# Patient Record
Sex: MALE | Race: WHITE | NOT HISPANIC OR LATINO | Employment: OTHER | URBAN - METROPOLITAN AREA
[De-identification: names, ages, dates, MRNs, and addresses within clinical notes are randomized per-mention and may not be internally consistent; named-entity substitution may affect disease eponyms.]

---

## 2017-03-13 ENCOUNTER — TELEPHONE (OUTPATIENT)
Dept: NEUROLOGY | Facility: MEDICAL CENTER | Age: 62
End: 2017-03-13

## 2017-03-13 NOTE — TELEPHONE ENCOUNTER
Message: patient called stating that Norco is not working his headache and he would like a refill of Vicodin/percocet. He had a rx from us the the rx .    Caller: grace  Call Back #: [phone]  OK to leave detailed message: no    Left message for patient to call back.

## 2017-03-15 NOTE — TELEPHONE ENCOUNTER
We are no longer prescribing narcotics due to changing laws so I would recommend discussing with his PMD. MB

## 2017-03-15 NOTE — TELEPHONE ENCOUNTER
Called and spoke with pt. All information was given and pt and she verbally understood and will comply with all given. DOMINIQUE

## 2017-05-02 ENCOUNTER — OFFICE VISIT (OUTPATIENT)
Dept: NEUROLOGY | Facility: MEDICAL CENTER | Age: 62
End: 2017-05-02
Payer: COMMERCIAL

## 2017-05-02 VITALS
WEIGHT: 182 LBS | HEART RATE: 50 BPM | DIASTOLIC BLOOD PRESSURE: 70 MMHG | SYSTOLIC BLOOD PRESSURE: 116 MMHG | OXYGEN SATURATION: 98 % | TEMPERATURE: 97.9 F | BODY MASS INDEX: 29.25 KG/M2 | RESPIRATION RATE: 16 BRPM | HEIGHT: 66 IN

## 2017-05-02 DIAGNOSIS — S09.90XS ATAXIA DUE TO OLD HEAD INJURY: ICD-10-CM

## 2017-05-02 DIAGNOSIS — G25.1 TREMOR DUE TO MULTIPLE DRUGS: ICD-10-CM

## 2017-05-02 DIAGNOSIS — F02.80 DEMENTIA ASSOCIATED WITH OTHER UNDERLYING DISEASE WITHOUT BEHAVIORAL DISTURBANCE (HCC): ICD-10-CM

## 2017-05-02 DIAGNOSIS — R27.0 ATAXIA DUE TO OLD HEAD INJURY: ICD-10-CM

## 2017-05-02 DIAGNOSIS — G43.719 INTRACTABLE CHRONIC MIGRAINE WITHOUT AURA AND WITHOUT STATUS MIGRAINOSUS: ICD-10-CM

## 2017-05-02 PROCEDURE — 99214 OFFICE O/P EST MOD 30 MIN: CPT | Performed by: PSYCHIATRY & NEUROLOGY

## 2017-05-02 RX ORDER — DIVALPROEX SODIUM 500 MG/1
1000 TABLET, DELAYED RELEASE ORAL 2 TIMES DAILY
COMMUNITY
Start: 2010-09-24 | End: 2017-11-07

## 2017-05-02 RX ORDER — DIVALPROEX SODIUM 500 MG/1
1000 TABLET, EXTENDED RELEASE ORAL 3 TIMES DAILY
Refills: 11 | COMMUNITY
Start: 2017-02-02 | End: 2017-11-07

## 2017-05-02 RX ORDER — LEVETIRACETAM 500 MG/1
500 TABLET ORAL
COMMUNITY
Start: 2012-01-25 | End: 2017-11-07

## 2017-05-02 RX ORDER — OXYBUTYNIN CHLORIDE 10 MG/1
10 TABLET, EXTENDED RELEASE ORAL
Refills: 0 | COMMUNITY
Start: 2017-02-07 | End: 2019-12-05

## 2017-05-02 RX ORDER — CELECOXIB 200 MG/1
200 CAPSULE ORAL
COMMUNITY
End: 2017-11-07

## 2017-05-02 RX ORDER — KETOROLAC TROMETHAMINE 10 MG/1
10 TABLET, FILM COATED ORAL EVERY 4 HOURS PRN
Qty: 10 TAB | Refills: 5 | Status: SHIPPED | OUTPATIENT
Start: 2017-05-02 | End: 2020-05-05

## 2017-05-02 RX ORDER — TRAZODONE HYDROCHLORIDE 50 MG/1
50 TABLET ORAL
COMMUNITY
End: 2017-11-07

## 2017-05-02 RX ORDER — PANTOPRAZOLE SODIUM 40 MG/1
40 TABLET, DELAYED RELEASE ORAL 2 TIMES DAILY
Refills: 1 | COMMUNITY
Start: 2017-04-24

## 2017-05-02 RX ORDER — ONABOTULINUMTOXINA 200 [USP'U]/1
INJECTION, POWDER, LYOPHILIZED, FOR SOLUTION INTRADERMAL; INTRAMUSCULAR
COMMUNITY
Start: 2017-04-07

## 2017-05-02 RX ORDER — BUTALBITAL, ACETAMINOPHEN AND CAFFEINE 50; 325; 40 MG/1; MG/1; MG/1
1 TABLET ORAL EVERY 4 HOURS PRN
Qty: 30 TAB | Refills: 1 | Status: SHIPPED | OUTPATIENT
Start: 2017-05-02 | End: 2023-05-08

## 2017-05-02 RX ORDER — DIVALPROEX SODIUM 250 MG/1
TABLET, EXTENDED RELEASE ORAL
COMMUNITY
Start: 2011-07-01 | End: 2017-11-07

## 2017-05-02 RX ORDER — AMOXICILLIN 500 MG/1
CAPSULE ORAL
Refills: 0 | COMMUNITY
Start: 2017-02-17 | End: 2017-11-07

## 2017-05-02 RX ORDER — DIVALPROEX SODIUM 500 MG/1
750 TABLET, DELAYED RELEASE ORAL
COMMUNITY
End: 2017-11-07

## 2017-05-02 RX ORDER — ZOLPIDEM TARTRATE 10 MG/1
10 TABLET ORAL
COMMUNITY
End: 2017-11-07

## 2017-05-02 RX ORDER — RANITIDINE 300 MG/1
300 TABLET ORAL
Refills: 6 | COMMUNITY
Start: 2017-03-28 | End: 2017-11-07

## 2017-05-02 RX ORDER — DIVALPROEX SODIUM 500 MG/1
500 TABLET, EXTENDED RELEASE ORAL 2 TIMES DAILY
COMMUNITY
Start: 2012-01-25 | End: 2019-12-30 | Stop reason: SDUPTHER

## 2017-05-02 RX ORDER — OMEGA-3-ACID ETHYL ESTERS 1 G/1
CAPSULE, LIQUID FILLED ORAL
COMMUNITY
End: 2017-11-07

## 2017-05-02 RX ORDER — OMEGA-3 FATTY ACIDS/FISH OIL 300-1000MG
CAPSULE ORAL
COMMUNITY

## 2017-05-02 NOTE — ASSESSMENT & PLAN NOTE
Pt has had more seizures with poor weather and storms coming in. Pt has had an increase in szs with storms.

## 2017-05-02 NOTE — ASSESSMENT & PLAN NOTE
Pt has issues with HA when he bends over. Pt feels better when he gets up and moving. Pt feels worse in the am. Pt gets up slowly. Pt gets Botox with DR. MCGARRY

## 2017-05-02 NOTE — MR AVS SNAPSHOT
"        Eriberto Young   2017 3:20 PM   Office Visit   MRN: [id number]    Department:  Neurology [facility]   Dept Phone:  [phone]    Description:  Male : 1955   Provider:  Melissa P Bloch, M.D.           Reason for Visit     Follow-Up           Allergies as of 2017     Allergen Noted Reactions    Ativan 2017       High doses    Hydromorphone Hcl 2017       Nkda [No Known Drug Allergy] 2007         You were diagnosed with     Intractable chronic migraine without aura and without status migrainosus   [403081]         Vital Signs     Blood Pressure Pulse Temperature Respirations Height Weight    116/70 mmHg 50 36.6 °C (97.9 °F) 16 1.676 m (5' 6\") 82.555 kg (182 lb)    Body Mass Index Oxygen Saturation                29.39 kg/m2 98%          Basic Information     Date Of Birth Sex Race Ethnicity Preferred Language    1955 Male White Non- English      Your appointments     2017 11:00 AM   Follow Up Visit with Melissa P Bloch, M.D.   [facility] (--)    [address] [place] [address], [address]  [facility] [id number] [phone]           You will be receiving a confirmation call a few days before your appointment from our automated call confirmation system.              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      Below and/or attached are the medications your provider expects you to take. Review all of your home medications and newly ordered medications with your provider and/or pharmacist. Follow medication instructions as directed by your provider and/or pharmacist. Please keep your medication list with you and share with your provider. Update the information when medications are discontinued, doses are changed, or new medications (including over-the-counter products) are added; and carry medication information at all times in the event of emergency situations     Allergies:  ATIVAN - (reactions not documented)     HYDROMORPHONE HCL - (reactions not documented)     NKDA - (reactions not documented)               Medications  Valid as of: May 02, 2017 -  4:48 PM    Generic Name Brand Name Tablet Size Instructions for use    Acetaminophen-Codeine (Tab) TYLENOL #3 300-30 MG Take  by mouth.        Amoxicillin (Cap) AMOXIL 500 MG TAKE 4 CAPSULES BY MOUTH 1 HR PRIOR TO DENTAL APPOINTMENT        Aspirin-Acetaminophen-Caffeine (Tab) EXCEDRIN 250-250-65 MG Take  by mouth.        Butalbital-APAP-Caffeine (Tab) FIORICET -40 MG Take 1 Tab by mouth every four hours as needed for Headache.        Celecoxib (Cap) CELEBREX 200 MG Take 200 mg by mouth.        Divalproex Sodium (Tablet Delayed Response) DEPAKOTE 500 MG Take 1,000 mg by mouth 3 times a day.        Divalproex Sodium (TABLET SR 24 HR) DEPAKOTE  MG by Other route.        Divalproex Sodium (TABLET SR 24 HR) DEPAKOTE  MG Take 1,000 mg by mouth.        Divalproex Sodium (Tablet Delayed Response) DEPAKOTE 500 MG Take 1,000 mg by mouth 2 Times a Day.        Divalproex  Sodium (Tablet Delayed Response) DEPAKOTE 500 MG Take 750 mg by mouth.        Divalproex Sodium (TABLET SR 24 HR) DEPAKOTE  MG Take 1,000 mg by mouth 3 times a day.        Hydrocodone-Acetaminophen (Tab) NORCO 5-325 MG Take 1-2 Tabs by mouth every four hours as needed.        Hydrocodone-Acetaminophen (Tab) NORCO 5-325 MG Take 1-2 Tabs by mouth every four hours as needed.        Ibuprofen (Cap) Ibuprofen 200 MG None Entered        Isometheptene-Dichloral-APAP (Cap) MIDRIN -325 MG Take  by mouth.        Ketorolac Tromethamine (Tab) TORADOL 10 MG Take 1 Tab by mouth every four hours as needed.        LevETIRAcetam (Tab) KEPPRA 500 MG Take 500 mg by mouth.        Memantine HCl (CAPSULE SR 24 HR) NAMENDA 28 MG Take 1 Cap by mouth every day.        Omega-3-acid Ethyl Esters (Cap) LOVAZA 1 GM Take 1,000 mg by mouth every day.        Omega-3-acid Ethyl Esters (Cap) LOVAZA 1 GM 4 CAPSULES DAILY WITH FOOD        OnabotulinumtoxinA (Recon Soln) BOTOX 200 UNITS         Oxybutynin Chloride (TABLET SR 24 HR) DITROPAN-XL 10 MG Take 10 mg by mouth.        Oxycodone-Acetaminophen (Tab) PERCOCET 5-325 MG Take 1-2 Tabs by mouth every 6 hours as needed (moderate to severe pain).        Pantoprazole Sodium (Tablet Delayed Response) PROTONIX 40 MG TAKE 1 TABLET BY MOUTH DAILY, 30 MIN BEFORE BREAKFAST        Pediatric Multivitamins-Fl           Prenatal Vit-Fe Fumarate-FA           Propranolol HCl (CAPSULE SR 24 HR) INDERAL LA 60 MG TAKE 1 CAP BY MOUTH EVERY DAY.        RaNITidine HCl (Tab) ZANTAC 300 MG Take 300 mg by mouth.        Topiramate (Tab) TOPAMAX 25 MG Take 1 Tab by mouth every morning. 50 mg prn        Topiramate (Tab) TOPAMAX 50 MG TAKE 1 TABLET BY MOUTH TWICE A DAY        TraZODone HCl (Tab) DESYREL 50 MG Take 50 mg by mouth as needed.        TraZODone HCl (Tab) DESYREL 50 MG Take 50 mg by mouth.        Zolpidem Tartrate (Tab) AMBIEN 10 MG Take 10 mg by mouth.        .                 Medicines prescribed today  were sent to:     [id number] - [place], [place] - [address]    [address] [place] [address]    Phone: [phone] Fax: [phone]    Open 24 Hours?: No      Medication refill instructions:       If your prescription bottle indicates you have medication refills left, it is not necessary to call your provider’s office. Please contact your pharmacy and they will refill your medication.    If your prescription bottle indicates you do not have any refills left, you may request refills at any time through one of the following ways: The online [employer] management system (except Urgent Care), by calling your provider’s office, or by asking your pharmacy to contact your provider’s office with a refill request. Medication refills are processed only during regular business hours and may not be available until the next business day. Your provider may request additional information or to have a follow-up visit with you prior to refilling your medication.   *Please Note: Medication refills are assigned a new Rx number when refilled electronically. Your pharmacy may indicate that no refills were authorized even though a new prescription for the same medication is available at the pharmacy. Please request the medicine by name with the pharmacy before contacting your provider for a refill.        Your To Do List     Future Labs/Procedures Complete By Expires    CBC WITH DIFFERENTIAL  As directed 5/2/2018    COMP METABOLIC PANEL  As directed 5/2/2018         [employer] Access Code: [email or web address]  Expires: 6/1/2017  2:57 PM    Your email address is not on file at [facility].  Email Addresses are required for you to sign up for [employer] management, please contact [phone] to verify your personal information and to provide your email address prior to attempting to register for [employer] management.    Eriberto Young  [address] [place] [address] [place] [address]    [employer] management  A secure, online tool to manage your health information     [facility]’s MyChart® is a secure, online tool that connects you to your personalized health information from the privacy of your home -- day or night - making it very easy for you to manage your healthcare. Once the activation process is completed, you can even access your medical information using the Kippt sena, which is available for free in the Apple Sena store or Google Play store.     To learn more about Kippt, visit [email or web address]    There are two levels of access available (as shown below):   My Chart Features  [facility]  Urgent  Care Non-[facility] Doctor   Email your healthcare team securely and privately 24/7 X X X    Manage appointments: schedule your next appointment; view details of past/upcoming appointments X      Request prescription refills. X      View recent personal medical records, including lab and immunizations X X X X   View health record, including health history, allergies, medications X X X X   Read reports about your outpatient visits, procedures, consult and ER notes X X X X   See your discharge summary, which is a recap of your hospital and/or ER visit that includes your diagnosis, lab results, and care plan X X  X     How to register for Kippt:  Once your e-mail address has been verified, follow the following steps to sign up for Kippt.     1. Go to  [email or web address]  2. Click on the Sign Up Now box, which takes you to the New Member Sign Up page. You will need to provide the following information:  a. Enter your Kippt Access Code exactly as it appears at the top of this page. (You will not need to use this code after you’ve completed the sign-up process. If you do not sign up before the expiration date, you must request a new code.)   b. Enter your date of birth.   c. Enter your home email address.   d. Click Submit, and follow the next screen’s instructions.  3. Create a Kippt ID. This will be your Fastnotet  login ID and cannot be changed, so think of one that is secure and easy to remember.  4. Create a [employer] password. You can change your password at any time.  5. Enter your Password Reset Question and Answer. This can be used at a later time if you forget your password.   6. Enter your e-mail address. This allows you to receive e-mail notifications when new information is available in [employer].  7. Click Sign Up. You can now view your health information.    For assistance activating your [employer] account, call [phone]

## 2017-05-09 NOTE — PROGRESS NOTES
CHIEF COMPLAINT  Chief Complaint   Patient presents with   • Follow-Up       HPI  Eriberto Young is a 60 y.o. male who presents for treatment of multiple types of headaches with concerns about his emotional state and frequent crying. Patient did get some benefit from an ONB at [facility].  Headache, chronic migraine without aura, intractable  Pt has issues with HA when he bends over. Pt feels better when he gets up and moving. Pt feels worse in the am. Pt gets up slowly. Pt gets Botox with DR. MCGARRY    Complex partial epilepsy, intractable  Pt has had more seizures with poor weather and storms coming in. Pt has had an increase in szs with storms.    Tremor due to multiple drugs  Tremor gets slightly worse when he is stressed and we did discuss that the medications like Depakote may be exacerbating this.    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    Dementia associated with other underlying disease without behavioral disturbance  Memory problems persist but have stabilized.      REVIEW OF SYSTEMS  Pertinent Positives: Memory loss and cognitive change,  Gait imbalance, pseudoseizure.   All other systems are negative.     PAST MEDICAL HISTORY  Past Medical History   Diagnosis Date   • Hydrocephalus      w/ shunt   • Post concussion syndrome 11/10/2014       SOCIAL HISTORY  Social History     Social History   • Marital Status:      Spouse Name: N/A   • Number of Children: N/A   • Years of Education: N/A     Occupational History   • Not on file.     Social History Main Topics   • Smoking status: Never Smoker    • Smokeless tobacco: Not on file   • Alcohol Use: Yes   • Drug Use: No   • Sexual Activity: Not on file     Other Topics Concern   • Not on file     Social History Narrative       SURGICAL HISTORY  Past Surgical History   Procedure Laterality Date   • Hernia rep hiatal       times 2   • Achilles tendon repair         CURRENT  MEDICATIONS  Current Outpatient Prescriptions   Medication Sig Dispense Refill   • acetaminophen-codeine #3 (TYLENOL #3) 300-30 MG Tab Take  by mouth.     • divalproex (DEPAKOTE) 500 MG Tablet Delayed Response Take 1,000 mg by mouth 2 Times a Day.     • BOTOX 200 UNITS Recon Soln      • oxybutynin SR (DITROPAN-XL) 10 MG CR tablet Take 10 mg by mouth.  0   • pantoprazole (PROTONIX) 40 MG Tablet Delayed Response TAKE 1 TABLET BY MOUTH DAILY, 30 MIN BEFORE BREAKFAST  1   • ketorolac (TORADOL) 10 MG Tab Take 1 Tab by mouth every four hours as needed. 10 Tab 5   • acetaminophen/caffeine/butalbital 325-40-50 mg (FIORICET) -40 MG Tab Take 1 Tab by mouth every four hours as needed for Headache. 30 Tab 1   • Memantine HCl ER (NAMENDA) 28 MG CAPSULE SR 24 HR Take 1 Cap by mouth every day. 30 Cap 5   • propranolol LA (INDERAL LA) 60 MG CAPSULE SR 24 HR TAKE 1 CAP BY MOUTH EVERY DAY. 90 Cap 4   • topiramate (TOPAMAX) 50 MG tablet TAKE 1 TABLET BY MOUTH TWICE A  Tab 3   • hydrocodone-acetaminophen (NORCO) 5-325 MG TABS per tablet Take 1-2 Tabs by mouth every four hours as needed. 30 Tab 5   • oxycodone-acetaminophen (PERCOCET) 5-325 MG TABS Take 1-2 Tabs by mouth every 6 hours as needed (moderate to severe pain). 10 Each 0   • omega-3 acid ethyl esters (LOVAZA) 1 GM capsule Take 1,000 mg by mouth every day.     • trazodone (DESYREL) 50 MG TABS Take 50 mg by mouth as needed.     • M-VIT PO      • Ibuprofen 200 MG Cap None Entered     • asa/apap/caffeine (EXCEDRIN) 250-250-65 MG Tab Take  by mouth.     • celecoxib (CELEBREX) 200 MG Cap Take 200 mg by mouth.     • levetiracetam (KEPPRA) 500 MG Tab Take 500 mg by mouth.     • acetaminophen-isometheptene-dichloralphonazone (MIDRIN) -325 MG Cap Take  by mouth.     • Pediatric Multivitamins-Fl (MULTI VIT/FLUORIDE PO)      • zolpidem (AMBIEN) 10 MG Tab Take 10 mg by mouth.     • divalproex ER (DEPAKOTE ER) 250 MG TABLET SR 24 HR by Other route.     • divalproex ER  "(DEPAKOTE ER) 500 MG TABLET SR 24 HR Take 1,000 mg by mouth.     • divalproex (DEPAKOTE) 500 MG Tablet Delayed Response Take 750 mg by mouth.     • omega-3 acid ethyl esters (LOVAZA) 1 GM capsule 4 CAPSULES DAILY WITH FOOD     • trazodone (DESYREL) 50 MG Tab Take 50 mg by mouth.     • amoxicillin (AMOXIL) 500 MG Cap TAKE 4 CAPSULES BY MOUTH 1 HR PRIOR TO DENTAL APPOINTMENT  0   • divalproex ER (DEPAKOTE ER) 500 MG TABLET SR 24 HR Take 1,000 mg by mouth 3 times a day.  11   • ranitidine (ZANTAC) 300 MG tablet Take 300 mg by mouth.  6   • topiramate (TOPAMAX) 25 MG TABS Take 1 Tab by mouth every morning. 50 mg prn 90 Tab 11   • hydrocodone-acetaminophen (NORCO) 5-325 MG TABS per tablet Take 1-2 Tabs by mouth every four hours as needed.     • divalproex EC (DEPAKOTE) 500 MG TBEC Take 1,000 mg by mouth 3 times a day.       No current facility-administered medications for this visit.       ALLERGIES  Allergies   Allergen Reactions   • Ativan      High doses   • Hydromorphone Hcl    • Nkda [No Known Drug Allergy]        PHYSICAL EXAM  VITAL SIGNS: /70 mmHg  Pulse 50  Temp(Src) 36.6 °C (97.9 °F)  Resp 16  Ht 1.676 m (5' 6\")  Wt 82.555 kg (182 lb)  BMI 29.39 kg/m2  SpO2 98%  Constitutional: Well developed, Well nourished, No acute distress, Non-toxic appearance.   HENT: Shunt placement is palpated and nontender  Eyes: Fundi disc sharp, start her movements intact  Neck: Normal range of motion, No tenderness, Supple, No stridor.   Cardiovascular: Normal heart rate, Normal rhythm, No murmurs, No rubs, No gallops.   Thorax & Lungs: Normal breath sounds, No respiratory distress, No wheezing, No chest tenderness.   Abdomen: Bowel sounds normal, Soft, No tenderness, No masses, No pulsatile masses.   Skin: Warm, Dry, No erythema, No rash.   Back: No tenderness, No CVA tenderness.   Extremities: Intact distal pulses, No edema, No tenderness, No cyanosis, No clubbing.   Neurologic: Alert and oriented x3, cranial nerves " II through XII intact, motor exam:  Psychiatric: Affect normal, Judgment normal, Mood normal.   Lab: Reviewed with patient in detail and as noted in results.    EEG  EEG was normal and patient had nonepileptic events during the evaluation.      ASSESSMENT AND PLAN  1. Chronic migraine without aura, with intractable migraine, so stated, without mention of status migrainosus  Plan is to continue propranolol. He is experiencing headaches every day that are migrainous. Patient has received benefit from Botox and external nerve blocks from his other specialists. Plan to continue Depakote for headache prevention in addition to Topamax.  - propranolol LA (INDERAL LA) 60 MG CP24; Take 1 Cap by mouth every day.  Dispense: 30 Cap; Refill: 11    2. Tremor due to multiple drugs  Patient also has a slight stammer and hand action tremor. The propranolol also helps with a tremor in addition to the headaches.     3. Dementia from underlying medical problem related to head injury    Continue Namenda    4. Ataxia from underlying head injury    Recommend physical therapy at home and make referral if necessary    5. Complex partial epilepsy intractable    Continue refilling Depakote and Topamax. Slow taper off of Depakote as patient tolerates to reduce tremor.Recommend good hydration while taking these medications.      I spent 35 minutes with this patient and his wife, over fifty percent was spent counseling patient on their condition, best management practices, reviewing test results and risks and benefits of treatment.

## 2017-05-09 NOTE — ASSESSMENT & PLAN NOTE
Tremor gets slightly worse when he is stressed and we did discuss that the medications like Depakote may be exacerbating this.

## 2017-10-17 ENCOUNTER — HOSPITAL ENCOUNTER (OUTPATIENT)
Dept: HOSPITAL 8 - OUT | Age: 62
End: 2017-10-17
Attending: INTERNAL MEDICINE
Payer: COMMERCIAL

## 2017-10-17 VITALS — WEIGHT: 175.05 LBS | BODY MASS INDEX: 28.13 KG/M2 | HEIGHT: 66 IN

## 2017-10-17 VITALS — SYSTOLIC BLOOD PRESSURE: 122 MMHG | DIASTOLIC BLOOD PRESSURE: 79 MMHG

## 2017-10-17 DIAGNOSIS — Z87.19: ICD-10-CM

## 2017-10-17 DIAGNOSIS — K44.9: ICD-10-CM

## 2017-10-17 DIAGNOSIS — K22.70: Primary | ICD-10-CM

## 2017-10-17 DIAGNOSIS — Z98.890: ICD-10-CM

## 2017-10-17 DIAGNOSIS — G43.909: ICD-10-CM

## 2017-10-17 PROCEDURE — 43239 EGD BIOPSY SINGLE/MULTIPLE: CPT

## 2017-10-17 PROCEDURE — 88305 TISSUE EXAM BY PATHOLOGIST: CPT

## 2017-10-17 PROCEDURE — 93005 ELECTROCARDIOGRAM TRACING: CPT

## 2017-10-17 RX ADMIN — MIDAZOLAM HYDROCHLORIDE PRN MG: 1 INJECTION, SOLUTION INTRAMUSCULAR; INTRAVENOUS at 11:37

## 2017-10-17 RX ADMIN — FENTANYL CITRATE PRN MCG: 50 INJECTION INTRAMUSCULAR; INTRAVENOUS at 12:06

## 2017-10-17 RX ADMIN — LORAZEPAM PRN MG: 2 INJECTION INTRAMUSCULAR; INTRAVENOUS at 11:43

## 2017-10-17 RX ADMIN — FENTANYL CITRATE PRN MCG: 50 INJECTION INTRAMUSCULAR; INTRAVENOUS at 12:22

## 2017-10-17 RX ADMIN — MIDAZOLAM HYDROCHLORIDE PRN MG: 1 INJECTION, SOLUTION INTRAMUSCULAR; INTRAVENOUS at 11:32

## 2017-10-17 RX ADMIN — FENTANYL CITRATE PRN MCG: 50 INJECTION INTRAMUSCULAR; INTRAVENOUS at 12:12

## 2017-10-17 RX ADMIN — FENTANYL CITRATE PRN MCG: 50 INJECTION INTRAMUSCULAR; INTRAVENOUS at 12:30

## 2017-10-17 RX ADMIN — LORAZEPAM PRN MG: 2 INJECTION INTRAMUSCULAR; INTRAVENOUS at 11:38

## 2017-11-07 ENCOUNTER — OFFICE VISIT (OUTPATIENT)
Dept: NEUROLOGY | Facility: MEDICAL CENTER | Age: 62
End: 2017-11-07
Payer: COMMERCIAL

## 2017-11-07 VITALS
BODY MASS INDEX: 28.28 KG/M2 | RESPIRATION RATE: 16 BRPM | HEART RATE: 62 BPM | TEMPERATURE: 97.7 F | SYSTOLIC BLOOD PRESSURE: 110 MMHG | WEIGHT: 176 LBS | OXYGEN SATURATION: 95 % | DIASTOLIC BLOOD PRESSURE: 62 MMHG | HEIGHT: 66 IN

## 2017-11-07 DIAGNOSIS — G25.1 TREMOR DUE TO MULTIPLE DRUGS: ICD-10-CM

## 2017-11-07 DIAGNOSIS — G43.719 INTRACTABLE CHRONIC MIGRAINE WITHOUT AURA AND WITHOUT STATUS MIGRAINOSUS: ICD-10-CM

## 2017-11-07 DIAGNOSIS — G40.219 PARTIAL SYMPTOMATIC EPILEPSY WITH COMPLEX PARTIAL SEIZURES, INTRACTABLE, WITHOUT STATUS EPILEPTICUS (HCC): ICD-10-CM

## 2017-11-07 DIAGNOSIS — F02.80 DEMENTIA ASSOCIATED WITH OTHER UNDERLYING DISEASE WITHOUT BEHAVIORAL DISTURBANCE (HCC): ICD-10-CM

## 2017-11-07 DIAGNOSIS — S09.90XS ATAXIA DUE TO OLD HEAD INJURY: ICD-10-CM

## 2017-11-07 DIAGNOSIS — F43.23 ADJUSTMENT REACTION WITH ANXIETY AND DEPRESSION: ICD-10-CM

## 2017-11-07 DIAGNOSIS — R27.0 ATAXIA DUE TO OLD HEAD INJURY: ICD-10-CM

## 2017-11-07 PROCEDURE — 99214 OFFICE O/P EST MOD 30 MIN: CPT | Performed by: PSYCHIATRY & NEUROLOGY

## 2017-11-07 RX ORDER — AZITHROMYCIN 250 MG/1
TABLET, FILM COATED ORAL
COMMUNITY
Start: 2017-08-17 | End: 2017-11-07

## 2017-11-07 RX ORDER — INFLUENZA VIRUS VACCINE 15; 15; 15; 15 UG/.5ML; UG/.5ML; UG/.5ML; UG/.5ML
SUSPENSION INTRAMUSCULAR
Refills: 0 | COMMUNITY
Start: 2017-10-06 | End: 2020-05-05

## 2017-11-07 ASSESSMENT — PATIENT HEALTH QUESTIONNAIRE - PHQ9
SUM OF ALL RESPONSES TO PHQ QUESTIONS 1-9: 13
CLINICAL INTERPRETATION OF PHQ2 SCORE: 3
5. POOR APPETITE OR OVEREATING: 3 - NEARLY EVERY DAY

## 2017-11-14 NOTE — PROGRESS NOTES
CHIEF COMPLAINT  Chief Complaint   Patient presents with   • Follow-Up     Intractable chronic migraine without aura and without status migrainosus   • Follow-Up     Partial symptomatic epilepsy with complex partial seizures, intractable, without status epilepticus       HPI  Eriberto Young is a 60 y.o. male who presents for treatment of multiple types of headaches with concerns about his emotional state and frequent crying. Patient did get some benefit from an ONB at [facility].  No problem-specific Assessment & Plan notes found for this encounter.    REVIEW OF SYSTEMS  Pertinent Positives: Memory loss and cognitive change,  Gait imbalance, pseudoseizure.   All other systems are negative.     PAST MEDICAL HISTORY  Past Medical History:   Diagnosis Date   • Complex partial epilepsy, intractable (CMS-HCC)    • Hydrocephalus     w/ shunt   • Post concussion syndrome 11/10/2014       SOCIAL HISTORY  Social History     Social History   • Marital status:      Spouse name: N/A   • Number of children: N/A   • Years of education: N/A     Occupational History   • Not on file.     Social History Main Topics   • Smoking status: Never Smoker   • Smokeless tobacco: Never Used   • Alcohol use Yes   • Drug use: No   • Sexual activity: Not on file     Other Topics Concern   • Not on file     Social History Narrative   • No narrative on file       SURGICAL HISTORY  Past Surgical History:   Procedure Laterality Date   • ACHILLES TENDON REPAIR     • HERNIA REP HIATAL      times 2       CURRENT MEDICATIONS  Current Outpatient Prescriptions   Medication Sig Dispense Refill   • divalproex ER (DEPAKOTE ER) 500 MG TABLET SR 24 HR Take 1,000 mg by mouth 2 Times a Day.     • BOTOX 200 UNITS Recon Soln      • oxybutynin SR (DITROPAN-XL) 10 MG CR tablet Take 10 mg by mouth.  0   • pantoprazole (PROTONIX) 40 MG Tablet Delayed Response TAKE 1 TABLET BY MOUTH DAILY, 30 MIN BEFORE BREAKFAST  1   • ketorolac (TORADOL) 10 MG Tab Take 1 Tab  "by mouth every four hours as needed. 10 Tab 5   • acetaminophen/caffeine/butalbital 325-40-50 mg (FIORICET) -40 MG Tab Take 1 Tab by mouth every four hours as needed for Headache. 30 Tab 1   • propranolol LA (INDERAL LA) 60 MG CAPSULE SR 24 HR TAKE 1 CAP BY MOUTH EVERY DAY. 90 Cap 4   • topiramate (TOPAMAX) 50 MG tablet TAKE 1 TABLET BY MOUTH TWICE A  Tab 3   • hydrocodone-acetaminophen (NORCO) 5-325 MG TABS per tablet Take 1-2 Tabs by mouth every four hours as needed. 30 Tab 5   • omega-3 acid ethyl esters (LOVAZA) 1 GM capsule Take 1,000 mg by mouth every day.     • trazodone (DESYREL) 50 MG TABS Take 50 mg by mouth as needed.     • M-VIT PO      • FLUARIX QUADRIVALENT 0.5 ML Suspension Prefilled Syringe injection TO BE ADMINISTERED BY PHARMACIST FOR IMMUNIZATION  0   • Ibuprofen 200 MG Cap None Entered     • asa/apap/caffeine (EXCEDRIN) 250-250-65 MG Tab Take  by mouth.       No current facility-administered medications for this visit.        ALLERGIES  Allergies   Allergen Reactions   • Ativan      High doses   • Hydromorphone Hcl    • Nkda [No Known Drug Allergy]        PHYSICAL EXAM  VITAL SIGNS: /62   Pulse 62   Temp 36.5 °C (97.7 °F)   Resp 16   Ht 1.676 m (5' 6\")   Wt 79.8 kg (176 lb)   SpO2 95%   BMI 28.41 kg/m²   Constitutional: Well developed, Well nourished, No acute distress, Non-toxic appearance.   HENT: Shunt placement is palpated and nontender  Eyes: Fundi disc sharp, start her movements intact  Neck: Normal range of motion, No tenderness, Supple, No stridor.   Cardiovascular: Normal heart rate, Normal rhythm, No murmurs, No rubs, No gallops.   Thorax & Lungs: Normal breath sounds, No respiratory distress, No wheezing, No chest tenderness.   Abdomen: Bowel sounds normal, Soft, No tenderness, No masses, No pulsatile masses.   Skin: Warm, Dry, No erythema, No rash.   Back: No tenderness, No CVA tenderness.   Extremities: Intact distal pulses, No edema, No tenderness, No " cyanosis, No clubbing.   Neurologic: Alert and oriented x3, cranial nerves II through XII intact, motor exam:  Psychiatric: Affect normal, Judgment normal, Mood normal.   Lab: Reviewed with patient in detail and as noted in results.    EEG  EEG was normal and patient had nonepileptic events during the evaluation.      ASSESSMENT AND PLAN  1. Chronic migraine without aura, with intractable migraine, so stated, without mention of status migrainosus  Plan is to continue propranolol. He is experiencing headaches every day that are migrainous. Patient has received benefit from Botox and external nerve blocks from his other specialists. Plan to continue Depakote for headache prevention in addition to Topamax.  - propranolol LA (INDERAL LA) 60 MG CP24; Take 1 Cap by mouth every day.  Dispense: 30 Cap; Refill: 11    2. Tremor due to multiple drugs  Patient also has a slight stammer and hand action tremor. The propranolol also helps with a tremor in addition to the headaches.     3. Dementia from underlying medical problem related to head injury    Continue Namenda    4. 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 Complex partial epilepsy intractable    Continue refilling Depakote and Topamax. Slow taper off of Depakote as patient tolerates to reduce tremor.Recommend good hydration while taking these medications.      I spent 35 minutes with this patient and his wife, over fifty percent was spent counseling patient on their condition, best management practices, reviewing test results and risks and benefits of treatment.

## 2017-11-14 NOTE — ASSESSMENT & PLAN NOTE
Patient has not had any breakthrough seizures recently. Patient occasionally have a nonepileptic seizure in a stressful time.

## 2017-11-14 NOTE — ASSESSMENT & PLAN NOTE
Patient and his wife believe the medications are helping and he was able to stay on his own over the summer.

## 2017-11-14 NOTE — ASSESSMENT & PLAN NOTE
Patient and his wife aware that the combination of the Topamax and the divalproex may make his tremors worse.

## 2018-01-09 ENCOUNTER — OFFICE VISIT (OUTPATIENT)
Dept: NEUROLOGY | Facility: MEDICAL CENTER | Age: 63
End: 2018-01-09
Payer: COMMERCIAL

## 2018-01-09 DIAGNOSIS — F02.80 DEMENTIA ASSOCIATED WITH OTHER UNDERLYING DISEASE WITHOUT BEHAVIORAL DISTURBANCE (HCC): ICD-10-CM

## 2018-01-09 DIAGNOSIS — G40.219 PARTIAL SYMPTOMATIC EPILEPSY WITH COMPLEX PARTIAL SEIZURES, INTRACTABLE, WITHOUT STATUS EPILEPTICUS (HCC): ICD-10-CM

## 2018-01-09 PROCEDURE — 99213 OFFICE O/P EST LOW 20 MIN: CPT | Performed by: PHYSICIAN ASSISTANT

## 2018-01-09 ASSESSMENT — PATIENT HEALTH QUESTIONNAIRE - PHQ9: CLINICAL INTERPRETATION OF PHQ2 SCORE: 0

## 2018-01-09 NOTE — PROGRESS NOTES
Cc:  - seizures and cognitive changes    Established patient of Dr. Bloch  who suffers from the above  Here today for completion of paperwork for FMLA/disability/related to their condition.    They report to me that this is their annual paperwork related to their condition.    Review of the medical chart and interviewing patient, I completed paperwork and it is scanned into media.     RTC as previously scheduled.    Total time with this visit: 15    Minutes face-to-face with patient. More than 50% of this visit was spent reviewing medical chart and speaking with the patient about their condition and how it affects their ability to do their job.

## 2018-05-07 ENCOUNTER — OFFICE VISIT (OUTPATIENT)
Dept: NEUROLOGY | Facility: MEDICAL CENTER | Age: 63
End: 2018-05-07
Payer: COMMERCIAL

## 2018-05-07 VITALS
BODY MASS INDEX: 27.99 KG/M2 | SYSTOLIC BLOOD PRESSURE: 116 MMHG | WEIGHT: 174.16 LBS | HEIGHT: 66 IN | HEART RATE: 56 BPM | DIASTOLIC BLOOD PRESSURE: 74 MMHG | TEMPERATURE: 98.2 F | OXYGEN SATURATION: 96 %

## 2018-05-07 DIAGNOSIS — G43.719 INTRACTABLE CHRONIC MIGRAINE WITHOUT AURA AND WITHOUT STATUS MIGRAINOSUS: ICD-10-CM

## 2018-05-07 DIAGNOSIS — F02.80 DEMENTIA ASSOCIATED WITH OTHER UNDERLYING DISEASE WITHOUT BEHAVIORAL DISTURBANCE (HCC): ICD-10-CM

## 2018-05-07 DIAGNOSIS — G25.1 TREMOR DUE TO MULTIPLE DRUGS: ICD-10-CM

## 2018-05-07 DIAGNOSIS — S09.90XS ATAXIA DUE TO OLD HEAD INJURY: ICD-10-CM

## 2018-05-07 DIAGNOSIS — R27.0 ATAXIA DUE TO OLD HEAD INJURY: ICD-10-CM

## 2018-05-07 DIAGNOSIS — G40.219 PARTIAL SYMPTOMATIC EPILEPSY WITH COMPLEX PARTIAL SEIZURES, INTRACTABLE, WITHOUT STATUS EPILEPTICUS (HCC): ICD-10-CM

## 2018-05-07 PROCEDURE — 99214 OFFICE O/P EST MOD 30 MIN: CPT | Performed by: PSYCHIATRY & NEUROLOGY

## 2018-05-07 RX ORDER — MEMANTINE HYDROCHLORIDE 2 MG/ML
SOLUTION ORAL
COMMUNITY
End: 2020-05-05

## 2018-05-07 RX ORDER — OXYCODONE HYDROCHLORIDE AND ACETAMINOPHEN 5; 325 MG/1; MG/1
1-2 TABLET ORAL EVERY 4 HOURS PRN
COMMUNITY
End: 2023-05-08

## 2018-05-07 ASSESSMENT — PATIENT HEALTH QUESTIONNAIRE - PHQ9
CLINICAL INTERPRETATION OF PHQ2 SCORE: 3
5. POOR APPETITE OR OVEREATING: 0 - NOT AT ALL
SUM OF ALL RESPONSES TO PHQ QUESTIONS 1-9: 7

## 2018-05-07 NOTE — PROGRESS NOTES
CHIEF COMPLAINT  Chief Complaint   Patient presents with   • Follow-Up     intractable chronic migraines       HPI  Eriberto Young is a 60 y.o. male who presents for treatment of multiple types of headaches with concerns about his memory being a little worse. Patient did get some benefit from an ONB at [facility].  Headache, chronic migraine without aura, intractable  Pt has been doing ok with the headaches and he has been getting botox with Dr. Henriquez. Last on March 29th.    Tremor due to multiple drugs  Tremor has been better with the lower depakote.    Dementia associated with other underlying disease without behavioral disturbance  Pt has benefit from the Namenda and he is happy to get the generic. Pt states that he not sure if it is working as well.    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  Pt feels like he is still off balance when he tired.    Complex partial epilepsy, intractable  Pt has not had as many breakthrough seizures.    REVIEW OF SYSTEMS  Pertinent Positives: Memory loss and cognitive change,  Gait imbalance, pseudoseizure.   All other systems are negative.     PAST MEDICAL HISTORY  Past Medical History:   Diagnosis Date   • Complex partial epilepsy, intractable (HCC)    • Hydrocephalus     w/ shunt   • Post concussion syndrome 11/10/2014       SOCIAL HISTORY  Social History     Social History   • Marital status:      Spouse name: N/A   • Number of children: N/A   • Years of education: N/A     Occupational History   • Not on file.     Social History Main Topics   • Smoking status: Never Smoker   • Smokeless tobacco: Never Used   • Alcohol use Yes   • Drug use: No   • Sexual activity: Not on file     Other Topics Concern   • Not on file     Social History Narrative   • No narrative on file       SURGICAL HISTORY  Past Surgical History:   Procedure Laterality Date   • ACHILLES TENDON REPAIR     • HERNIA REP HIATAL      times 2       CURRENT MEDICATIONS  Current Outpatient Prescriptions   Medication  "Sig Dispense Refill   • Memantine HCl (NAMENDA) 2 MG/ML Solution Take  by mouth.     • oxyCODONE-acetaminophen (PERCOCET) 5-325 MG Tab Take 1-2 Tabs by mouth every four hours as needed.     • divalproex ER (DEPAKOTE ER) 500 MG TABLET SR 24 HR Take 1,000 mg by mouth 2 Times a Day.     • oxybutynin SR (DITROPAN-XL) 10 MG CR tablet Take 10 mg by mouth.  0   • ketorolac (TORADOL) 10 MG Tab Take 1 Tab by mouth every four hours as needed. 10 Tab 5   • acetaminophen/caffeine/butalbital 325-40-50 mg (FIORICET) -40 MG Tab Take 1 Tab by mouth every four hours as needed for Headache. 30 Tab 1   • propranolol LA (INDERAL LA) 60 MG CAPSULE SR 24 HR TAKE 1 CAP BY MOUTH EVERY DAY. 90 Cap 4   • topiramate (TOPAMAX) 50 MG tablet TAKE 1 TABLET BY MOUTH TWICE A  Tab 3   • hydrocodone-acetaminophen (NORCO) 5-325 MG TABS per tablet Take 1-2 Tabs by mouth every four hours as needed. 30 Tab 5   • omega-3 acid ethyl esters (LOVAZA) 1 GM capsule Take 1,000 mg by mouth every day.     • trazodone (DESYREL) 50 MG TABS Take 50 mg by mouth as needed.     • FLUARIX QUADRIVALENT 0.5 ML Suspension Prefilled Syringe injection TO BE ADMINISTERED BY PHARMACIST FOR IMMUNIZATION  0   • Ibuprofen 200 MG Cap None Entered     • asa/apap/caffeine (EXCEDRIN) 250-250-65 MG Tab Take  by mouth.     • BOTOX 200 UNITS Recon Soln      • pantoprazole (PROTONIX) 40 MG Tablet Delayed Response TAKE 1 TABLET BY MOUTH DAILY, 30 MIN BEFORE BREAKFAST  1   • M-VIT PO        No current facility-administered medications for this visit.        ALLERGIES  Allergies   Allergen Reactions   • Ativan      High doses   • Hydromorphone Hcl    • Nkda [No Known Drug Allergy]        PHYSICAL EXAM  VITAL SIGNS: /74   Pulse (!) 56   Temp 36.8 °C (98.2 °F)   Ht 1.676 m (5' 6\")   Wt 79 kg (174 lb 2.6 oz)   SpO2 96%   BMI 28.11 kg/m²   Constitutional: Well developed, Well nourished, No acute distress, Non-toxic appearance.   HENT: Shunt placement is palpated and " nontender  Eyes: Fundi disc sharp, start her movements intact  Neck: Normal range of motion, No tenderness, Supple, No stridor.   Cardiovascular: Normal heart rate, Normal rhythm, No murmurs, No rubs, No gallops.   Thorax & Lungs: Normal breath sounds, No respiratory distress, No wheezing, No chest tenderness.   Abdomen: Bowel sounds normal, Soft, No tenderness, No masses, No pulsatile masses.   Skin: Warm, Dry, No erythema, No rash.   Back: No tenderness, No CVA tenderness.   Extremities: Intact distal pulses, No edema, No tenderness, No cyanosis, No clubbing.   Neurologic: Alert and oriented x3, cranial nerves II through XII intact, motor exam:  Psychiatric: Affect normal, Judgment normal, Mood normal.   Lab: Reviewed with patient in detail and as noted in results.    EEG  EEG was normal and patient had nonepileptic events during the evaluation.      ASSESSMENT AND PLAN  1. Chronic migraine without aura, with intractable migraine, so stated, without mention of status migrainosus  Plan is to continue propranolol. He is experiencing headaches every day that are migrainous. Patient has received benefit from Botox and external nerve blocks from his other specialists. Plan to continue Depakote for headache prevention in addition to Topamax.  - propranolol LA (INDERAL LA) 60 MG CP24; Take 1 Cap by mouth every day.  Dispense: 30 Cap; Refill: 11    2. Tremor due to multiple drugs  Patient also has a slight stammer and hand action tremor. The propranolol also helps with a tremor in addition to the headaches.     3. Dementia from underlying medical problem related to head injury    Pt is taking Namenda generic    4. 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      5. Complex partial epilepsy intractable    Continue refilling Depakote and Topamax. Slow taper off of Depakote as patient tolerates to reduce tremor which has helped since the last visit.      I spent 35 minutes  with this patient and his wife face to face, over fifty percent was spent counseling patient on their condition, best management practices, reviewing test results and risks and benefits of treatment.

## 2018-05-07 NOTE — ASSESSMENT & PLAN NOTE
Pt has been doing ok with the headaches and he has been getting botox with Dr. Henriquez. Last on March 29th.

## 2018-05-07 NOTE — ASSESSMENT & PLAN NOTE
Pt has benefit from the Namenda and he is happy to get the generic. Pt states that he not sure if it is working as well.

## 2018-11-12 ENCOUNTER — OFFICE VISIT (OUTPATIENT)
Dept: NEUROLOGY | Facility: MEDICAL CENTER | Age: 63
End: 2018-11-12
Payer: COMMERCIAL

## 2018-11-12 VITALS
BODY MASS INDEX: 27.97 KG/M2 | DIASTOLIC BLOOD PRESSURE: 68 MMHG | SYSTOLIC BLOOD PRESSURE: 118 MMHG | WEIGHT: 174 LBS | HEART RATE: 86 BPM | HEIGHT: 66 IN | OXYGEN SATURATION: 98 % | TEMPERATURE: 97.9 F

## 2018-11-12 DIAGNOSIS — F02.80 DEMENTIA ASSOCIATED WITH OTHER UNDERLYING DISEASE WITHOUT BEHAVIORAL DISTURBANCE (HCC): ICD-10-CM

## 2018-11-12 DIAGNOSIS — G40.219 PARTIAL SYMPTOMATIC EPILEPSY WITH COMPLEX PARTIAL SEIZURES, INTRACTABLE, WITHOUT STATUS EPILEPTICUS (HCC): ICD-10-CM

## 2018-11-12 DIAGNOSIS — G25.1 TREMOR DUE TO MULTIPLE DRUGS: ICD-10-CM

## 2018-11-12 DIAGNOSIS — R27.0 ATAXIA DUE TO OLD HEAD INJURY: ICD-10-CM

## 2018-11-12 DIAGNOSIS — G43.719 INTRACTABLE CHRONIC MIGRAINE WITHOUT AURA AND WITHOUT STATUS MIGRAINOSUS: ICD-10-CM

## 2018-11-12 DIAGNOSIS — S09.90XS ATAXIA DUE TO OLD HEAD INJURY: ICD-10-CM

## 2018-11-12 PROCEDURE — 99214 OFFICE O/P EST MOD 30 MIN: CPT | Performed by: PSYCHIATRY & NEUROLOGY

## 2018-11-12 NOTE — PROGRESS NOTES
CHIEF COMPLAINT  Chief Complaint   Patient presents with   • Follow-Up     intractable chronic migraine without aura       HPI  Eriberto Young is a 60 y.o. male who presents for treatment of multiple types of headaches with concerns about his memory being a little worse. Patient did get some benefit from an ONB at [facility] and they have been giving him the pain medications. Pt has more headaches with windy days.  Headache, chronic migraine without aura, intractable  Pt has been taking Aimovig and he has been getting Botox. This has helped some.    Tremor due to multiple drugs  Pt has some tremor still from the medications.    Dementia associated with other underlying disease without behavioral disturbance  Pt is on less depakote and his memory has been stable.    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 Pt states that he is careful when he walks.    Complex partial epilepsy, intractable  Pt has been seizure free. Pt states that he feels like he has less non-epileptic seizures.    REVIEW OF SYSTEMS  Pertinent Positives: Memory loss and cognitive change,  Gait imbalance, pseudoseizure.   All other systems are negative.     PAST MEDICAL HISTORY  Past Medical History:   Diagnosis Date   • Complex partial epilepsy, intractable (HCC)    • Hydrocephalus     w/ shunt   • Post concussion syndrome 11/10/2014       SOCIAL HISTORY  Social History     Social History   • Marital status:      Spouse name: N/A   • Number of children: N/A   • Years of education: N/A     Occupational History   • Not on file.     Social History Main Topics   • Smoking status: Never Smoker   • Smokeless tobacco: Never Used   • Alcohol use Yes   • Drug use: No   • Sexual activity: Not on file     Other Topics Concern   • Not on file     Social History Narrative   • No narrative on file       SURGICAL HISTORY  Past Surgical History:   Procedure Laterality Date   • ACHILLES TENDON REPAIR     • HERNIA REP HIATAL       times 2       CURRENT MEDICATIONS  Current Outpatient Prescriptions   Medication Sig Dispense Refill   • Erenumab (AIMOVIG) 70 MG/ML Solution Auto-injector Inject 70 mg as instructed Once.     • Memantine HCl (NAMENDA) 2 MG/ML Solution Take  by mouth.     • divalproex ER (DEPAKOTE ER) 500 MG TABLET SR 24 HR Take 1,000 mg by mouth 2 Times a Day.     • pantoprazole (PROTONIX) 40 MG Tablet Delayed Response TAKE 1 TABLET BY MOUTH DAILY, 30 MIN BEFORE BREAKFAST  1   • propranolol LA (INDERAL LA) 60 MG CAPSULE SR 24 HR TAKE 1 CAP BY MOUTH EVERY DAY. 90 Cap 4   • topiramate (TOPAMAX) 50 MG tablet TAKE 1 TABLET BY MOUTH TWICE A  Tab 3   • omega-3 acid ethyl esters (LOVAZA) 1 GM capsule Take 1,000 mg by mouth every day.     • trazodone (DESYREL) 50 MG TABS Take 50 mg by mouth as needed.     • M-VIT PO      • oxyCODONE-acetaminophen (PERCOCET) 5-325 MG Tab Take 1-2 Tabs by mouth every four hours as needed.     • FLUARIX QUADRIVALENT 0.5 ML Suspension Prefilled Syringe injection TO BE ADMINISTERED BY PHARMACIST FOR IMMUNIZATION  0   • Ibuprofen 200 MG Cap None Entered     • asa/apap/caffeine (EXCEDRIN) 250-250-65 MG Tab Take  by mouth.     • BOTOX 200 UNITS Recon Soln      • oxybutynin SR (DITROPAN-XL) 10 MG CR tablet Take 10 mg by mouth.  0   • ketorolac (TORADOL) 10 MG Tab Take 1 Tab by mouth every four hours as needed. 10 Tab 5   • acetaminophen/caffeine/butalbital 325-40-50 mg (FIORICET) -40 MG Tab Take 1 Tab by mouth every four hours as needed for Headache. 30 Tab 1   • hydrocodone-acetaminophen (NORCO) 5-325 MG TABS per tablet Take 1-2 Tabs by mouth every four hours as needed. 30 Tab 5     No current facility-administered medications for this visit.        ALLERGIES  Allergies   Allergen Reactions   • Ativan      High doses   • Hydromorphone Hcl    • Nkda [No Known Drug Allergy]        PHYSICAL EXAM  VITAL SIGNS: /68 (BP Location: Left arm, Patient Position: Sitting, BP Cuff Size: Adult)   Pulse  "86   Temp 36.6 °C (97.9 °F) (Temporal)   Ht 1.676 m (5' 6\")   Wt 78.9 kg (174 lb)   SpO2 98%   BMI 28.08 kg/m²   Constitutional: Well developed, Well nourished, No acute distress, Non-toxic appearance.   HENT: Shunt placement is palpated and nontender  Eyes: Fundi disc sharp, start her movements intact  Neck: Normal range of motion, No tenderness, Supple, No stridor.   Cardiovascular: Normal heart rate, Normal rhythm, No murmurs, No rubs, No gallops.   Thorax & Lungs: Normal breath sounds, No respiratory distress, No wheezing, No chest tenderness.   Abdomen: Bowel sounds normal, Soft, No tenderness, No masses, No pulsatile masses.   Skin: Warm, Dry, No erythema, No rash.   Back: No tenderness, No CVA tenderness.   Extremities: Intact distal pulses, No edema, No tenderness, No cyanosis, No clubbing.   Neurologic: Alert and oriented x3, cranial nerves II through XII intact, motor exam:  Psychiatric: Affect normal, Judgment normal, Mood normal.   Lab: Reviewed with patient in detail and as noted in results.    EEG  EEG was normal and patient had nonepileptic events during the evaluation.      ASSESSMENT AND PLAN  1. Chronic migraine without aura, with intractable migraine, so stated, without mention of status migrainosus  Plan is to continue propranolol. He is experiencing headaches every day that are migrainous. Patient has received benefit from Botox and external nerve blocks from his other specialists. Plan to continue Depakote for headache prevention in addition to Topamax.  - propranolol LA (INDERAL LA) 60 MG CP24; Take 1 Cap by mouth every day.  Dispense: 30 Cap; Refill: 11  Pt is also on Aimovig. Pt is going to try CBD oils. Pt states that he sees Dr. Henriquez.    2. Tremor due to multiple drugs  Patient also has a slight stammer and hand action tremor. The propranolol also helps with a tremor in addition to the headaches.     3. Dementia from underlying medical problem related to head injury    Pt is " taking Namenda generic    4. 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      5. Complex partial epilepsy intractable    Continue refilling Depakote and Topamax. Slow taper off of Depakote as patient tolerates to reduce tremor which has helped since the last visit. Pt will also take CBD oil. Pt at times will have non-epiletic seizures.     I spent 35 minutes with this patient and his wife face to face, over fifty percent was spent counseling patient on their condition, best management practices, reviewing test results and risks and benefits of treatment.

## 2018-11-12 NOTE — ASSESSMENT & PLAN NOTE
Pt is on less depakote and his memory has been stable. Pt is doing ok with the Namenda and he is stable without much deterioration.

## 2019-03-13 RX ORDER — MEMANTINE HYDROCHLORIDE 28 MG/1
CAPSULE, EXTENDED RELEASE ORAL
Qty: 90 CAP | Refills: 2 | Status: SHIPPED | OUTPATIENT
Start: 2019-03-13 | End: 2020-05-05 | Stop reason: SDUPTHER

## 2019-03-13 NOTE — TELEPHONE ENCOUNTER
Was the patient seen in the last year in this department? Yes    Does patient have an active prescription for medications requested? Yes  Last seen by dr Bloch 11/12/18, next appt scheduled 5/13/19 with Bonnie    Received Request Via: Pharmacy

## 2019-04-03 ENCOUNTER — TELEPHONE (OUTPATIENT)
Dept: NEUROLOGY | Facility: MEDICAL CENTER | Age: 64
End: 2019-04-03

## 2019-04-03 NOTE — TELEPHONE ENCOUNTER
"Pt previously saw Dr. Bloch and is going to see you in June.     Pt's wife called and is asking if there is any contraindications between propanolol and his Abilify. She stated that when the pt took the medication he became very lightheaded like a \"low blood sugar feeling\". Wondering if you have any advise.       "

## 2019-04-08 ENCOUNTER — TELEPHONE (OUTPATIENT)
Dept: NEUROLOGY | Facility: MEDICAL CENTER | Age: 64
End: 2019-04-08

## 2019-04-08 NOTE — TELEPHONE ENCOUNTER
This patient is scheduled as a new to me in May but I think it would be better for him to see Dr Willson or perhaps Dr MANZO?  Thank you for your guidance.

## 2019-04-08 NOTE — TELEPHONE ENCOUNTER
Firstly, the medications should be taken with food.  Abilify tablets can produce a feeling of sluggishness and propranolol will lower your pulse/heart rate.  I'd recommend he discuss medications with PCP.      Also, if he continues to experience these feelings, recommend he check his blood sugar.

## 2019-04-22 ENCOUNTER — HOSPITAL ENCOUNTER (OUTPATIENT)
Dept: BEHAVIORAL HEALTH | Facility: MEDICAL CENTER | Age: 64
End: 2019-04-22
Attending: PSYCHIATRY & NEUROLOGY
Payer: COMMERCIAL

## 2019-04-22 DIAGNOSIS — F43.23 ADJUSTMENT REACTION WITH ANXIETY AND DEPRESSION: ICD-10-CM

## 2019-04-22 DIAGNOSIS — G43.719 INTRACTABLE CHRONIC MIGRAINE WITHOUT AURA AND WITHOUT STATUS MIGRAINOSUS: ICD-10-CM

## 2019-04-22 PROCEDURE — 90791 PSYCH DIAGNOSTIC EVALUATION: CPT
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
## 2019-05-13 ENCOUNTER — OFFICE VISIT (OUTPATIENT)
Dept: NEUROLOGY | Facility: MEDICAL CENTER | Age: 64
End: 2019-05-13
Payer: COMMERCIAL

## 2019-05-13 VITALS
TEMPERATURE: 98.1 F | RESPIRATION RATE: 16 BRPM | OXYGEN SATURATION: 95 % | HEIGHT: 66 IN | DIASTOLIC BLOOD PRESSURE: 70 MMHG | HEART RATE: 54 BPM | WEIGHT: 182.8 LBS | SYSTOLIC BLOOD PRESSURE: 116 MMHG | BODY MASS INDEX: 29.38 KG/M2

## 2019-05-13 DIAGNOSIS — Z98.2 S/P VP SHUNT: ICD-10-CM

## 2019-05-13 DIAGNOSIS — G91.9 HYDROCEPHALUS (HCC): ICD-10-CM

## 2019-05-13 DIAGNOSIS — G43.109 MIGRAINE WITH AURA AND WITHOUT STATUS MIGRAINOSUS, NOT INTRACTABLE: ICD-10-CM

## 2019-05-13 PROCEDURE — 99215 OFFICE O/P EST HI 40 MIN: CPT

## 2019-05-13 RX ORDER — GALCANEZUMAB 120 MG/ML
120 INJECTION, SOLUTION SUBCUTANEOUS
COMMUNITY
Start: 2019-02-18

## 2019-05-13 RX ORDER — DULOXETIN HYDROCHLORIDE 30 MG/1
60 CAPSULE, DELAYED RELEASE ORAL DAILY
COMMUNITY
End: 2023-05-08

## 2019-05-13 RX ORDER — PROPRANOLOL HYDROCHLORIDE 40 MG/1
40 TABLET ORAL DAILY
Qty: 60 TAB | Refills: 3 | Status: SHIPPED | OUTPATIENT
Start: 2019-05-13 | End: 2019-12-05 | Stop reason: SINTOL

## 2019-05-13 ASSESSMENT — ENCOUNTER SYMPTOMS
DEPRESSION: 1
HEADACHES: 1
SEIZURES: 1
MEMORY LOSS: 1
NERVOUS/ANXIOUS: 1

## 2019-05-13 ASSESSMENT — PATIENT HEALTH QUESTIONNAIRE - PHQ9
5. POOR APPETITE OR OVEREATING: 1 - SEVERAL DAYS
SUM OF ALL RESPONSES TO PHQ QUESTIONS 1-9: 19
CLINICAL INTERPRETATION OF PHQ2 SCORE: 3

## 2019-05-13 NOTE — PROGRESS NOTES
"Follow up appointment for migraines and seizures   Prior patient of Dr Melissa Bloch     CC: I am doing OK\"  64 yo pleasant male who is a patient of Dr Bloch comes for a follow up accompanied by his wife. They are both able to provide meaningful history.  He is reportedly battling depression and seeing psychologist and psychiatrist for this. He is here for migraines and complex partial seizures.  Followed by Dr Bloch says he used to be on 1000 mg bid Depakote   depakote reduced to 500 mg bid due to side effects including tremors of both hands which is now much better.  Using Emgality inj as migraine preventative however this is not as good as he had hoped for with lots of breakthrough headaches.  Migraines are triggered by the stormy weather  Now getting migraine every 2-3 weeks   They can get very severe 10/10 and they get to be encompassing wntire head.they originate on the right side of his head and cross over go to the front  He gets aura of smelling burning rubber  He got complex partial seizures with staring spells  With some shaking   He is on Emgality third shot and works better than Aimovig injections  He can see decreased in frequency of his headaches and severity.  Rescue medications are Fioricet and he is trying to rest.  He uses ketorolac and rests in the dark.  Moderate headache he uses Norco.  Takes Ca-Mag-Zn  EEG with sharp and spike transients in right temporal region.  Topamax 50 mg twice a day for prevention.  He is on Cymbalta 30 mg now (increased from 20 mg)   And he is not sure if this works.  Has postconcussive syndrome and was diagnosed with TBI  Last CT head in 2013  He's been on Botox and last injection was last month next due to august   Had  shunt placed 7 years ago and  shunt placed in [place].  Had frequency urinating and takes Myrrbetric (switched from oxybutinine)  helps with urination.  Allergic to Ativan and Hydromorphone.  States feet feel stuck to floor and he has urinary " frequency no accidents though. Some memory loss.Difficulty with attention/focus and executive function.    Review of Systems   Neurological: Positive for seizures and headaches.   Psychiatric/Behavioral: Positive for depression and memory loss. The patient is nervous/anxious.      Past Medical History:   Diagnosis Date   • Anxiety    • Chronic pain    • Complex partial epilepsy, intractable (HCC)    • Depression    • Head injuries    • Hydrocephalus     w/ shunt   • Post concussion syndrome 11/10/2014   • Self-injurious behavior    • Suicide attempt (HCC)      Past Surgical History:   Procedure Laterality Date   • ABDOMINAL EXPLORATION     • ACHILLES TENDON REPAIR     • CRANIOTOMY     • HERNIA REP HIATAL      times 2   • HERNIA REPAIR     • LAMINOTOMY       Social History     Social History   • Marital status:      Spouse name: N/A   • Number of children: N/A   • Years of education: N/A     Occupational History   • Not on file.     Social History Main Topics   • Smoking status: Never Smoker   • Smokeless tobacco: Never Used   • Alcohol use 1.2 oz/week     2 Glasses of wine per week   • Drug use: No   • Sexual activity: Not on file     Other Topics Concern   • Not on file     Social History Narrative   • No narrative on file     Ativan; Hydromorphone hcl; and Nkda [no known drug allergy]    Vitals:    05/13/19 1015   BP: 116/70   Pulse: (!) 54   Resp: 16   Temp: 36.7 °C (98.1 °F)   SpO2: 95%     Physical exam   Pleasant in NAD   Constitutional: Well developed, Well nourished, No acute distress, Non-toxic appearance.   HENT: Shunt placement is palpated and nontender  Eyes: Fundi disc sharp, start her movements intact  Neck: Normal range of motion, No tenderness, Supple, No stridor.   Cardiovascular: Normal heart rate, Normal rhythm, No murmurs, No rubs, No gallops.   Thorax & Lungs: Normal breath sounds, No respiratory distress, No wheezing, No chest tenderness.   Abdomen: Bowel sounds normal, Soft, No  tenderness, No masses, No pulsatile masses.   Skin: Warm, Dry, No erythema, No rash.   Back: No tenderness, No CVA tenderness.   Extremities: Intact distal pulses, No edema, No tenderness, No cyanosis, No clubbing.   Neurologic: Alert and oriented x3, cranial nerves II through XII intact, motor exam: no weakness   Psychiatric: Affect normal, Judgment normal, Mood normal.   Lab: Reviewed with patient in detail and as noted in results.    Flat affect   resting tremor both hands   No postural component to tremor and moderate action component   States it improved with decreased depakote 500 mg bid     Wide based gait   Bradykinesia+   No rigidity   Feet stuck to floor magnetic gait   pastpointing on FTN and can not perform with HTS   Romberg +  Can not tandem walk     Imaging and labs  Reviewed last CT head from 2013   Impression       1. No evidence of acute intracranial process.    2. Right ventricular shunt tube in place.    3. Moderate unchanged hydrocephalus.     Impression and plan:  Hydrocephalus s/p shunt placement   Last CT head in 2013   Migraine headaches and seizure like episodes  On  mg bid with no seizures   Exam : Magnetic gait, urinary frequency some cognitive dysfunction however unsure if majority is 2/2 depression   Will check MRI brain and ensure he gets shunt revision with neurosurgery   Last CT head in 2013  Hydrocephalus  Recommend Mag and B2 as migraine preventatives   Coenzyme Q 10   Decreasing propranolol to 40 mg from 60 mg as he has cognitive decline and fatigue (side effects of propanolol)   Limit opioids as they cause rebound headaches   Botox with me in 2 months ( he received one injection with moderate benefit)   RTC in 2-3 months     Patient was seen for 45 minutes face to face of which > 50% of appointment time was spent on counseling and coordination of care regarding the above.

## 2019-05-14 NOTE — PATIENT INSTRUCTIONS
"Normal-Pressure Hydrocephalus  Normal-pressure hydrocephalus (NPH) is a buildup of fluid (cerebrospinal fluid [CSF]) inside the brain. CSF is normal within the brain, but too much fluid can affect your brain's function. NPH commonly occurs in people older than 60 years of age.   CAUSES   The cause of NPH is not always known. It can be caused by anything that blocks the flow of CSF.   SIGNS AND SYMPTOMS   Since many symptoms of NPH are also associated with conditions sometimes seen in older people, taking care to note changes in behavior and mental function is important. Symptoms of NPH may include:   · Difficulty walking, such as:  ¨ Problems when beginning to walk.  ¨ Feet being \"frozen\" to the floor.  ¨ Shuffling feet when walking.  ¨ Unsteadiness.  · Problems with bowel and bladder control.  · Memory problems such as forgetfulness, lack of concentration, dull mood, or confusion.  DIAGNOSIS   · A medical history and physical exam will be done. A physical exam can reveal walking (gait) changes. Reflexes may be increased in the lower legs.  · Tests can include:  ¨ Lumbar puncture (spinal tap).  ¨ CT scan of your head.  ¨ MRI scan of your head.  TREATMENT   NPH is treated by having surgery to place a ventriculoperitoneal shunt in the brain. The ventriculoperitoneal shunt drains the excess CSF.   This information is not intended to replace advice given to you by your health care provider. Make sure you discuss any questions you have with your health care provider.  Document Released: 05/04/2015 Document Reviewed: 05/04/2015  [employer] Patient Education © 2017 [employer]    "

## 2019-05-20 ENCOUNTER — HOSPITAL ENCOUNTER (OUTPATIENT)
Dept: RADIOLOGY | Facility: MEDICAL CENTER | Age: 64
End: 2019-05-20
Payer: COMMERCIAL

## 2019-05-20 ENCOUNTER — HOSPITAL ENCOUNTER (EMERGENCY)
Facility: MEDICAL CENTER | Age: 64
End: 2019-05-20
Attending: EMERGENCY MEDICINE
Payer: COMMERCIAL

## 2019-05-20 VITALS
TEMPERATURE: 98.3 F | OXYGEN SATURATION: 94 % | DIASTOLIC BLOOD PRESSURE: 73 MMHG | RESPIRATION RATE: 16 BRPM | WEIGHT: 182.76 LBS | SYSTOLIC BLOOD PRESSURE: 116 MMHG | HEART RATE: 71 BPM | HEIGHT: 66 IN | BODY MASS INDEX: 29.37 KG/M2

## 2019-05-20 DIAGNOSIS — Z98.2 S/P VP SHUNT: ICD-10-CM

## 2019-05-20 DIAGNOSIS — G91.9 HYDROCEPHALUS (HCC): ICD-10-CM

## 2019-05-20 DIAGNOSIS — G40.901 STATUS EPILEPTICUS (HCC): ICD-10-CM

## 2019-05-20 DIAGNOSIS — G43.109 MIGRAINE WITH AURA AND WITHOUT STATUS MIGRAINOSUS, NOT INTRACTABLE: ICD-10-CM

## 2019-05-20 LAB
ALBUMIN SERPL BCP-MCNC: 3.9 G/DL (ref 3.2–4.9)
ALBUMIN/GLOB SERPL: 1.3 G/DL
ALP SERPL-CCNC: 39 U/L (ref 30–99)
ALT SERPL-CCNC: 20 U/L (ref 2–50)
ANION GAP SERPL CALC-SCNC: 10 MMOL/L (ref 0–11.9)
AST SERPL-CCNC: 21 U/L (ref 12–45)
BASOPHILS # BLD AUTO: 0.9 % (ref 0–1.8)
BASOPHILS # BLD: 0.08 K/UL (ref 0–0.12)
BILIRUB SERPL-MCNC: 0.4 MG/DL (ref 0.1–1.5)
BUN SERPL-MCNC: 14 MG/DL (ref 8–22)
CALCIUM SERPL-MCNC: 9.2 MG/DL (ref 8.4–10.2)
CHLORIDE SERPL-SCNC: 105 MMOL/L (ref 96–112)
CO2 SERPL-SCNC: 22 MMOL/L (ref 20–33)
CREAT SERPL-MCNC: 0.81 MG/DL (ref 0.5–1.4)
EOSINOPHIL # BLD AUTO: 0.5 K/UL (ref 0–0.51)
EOSINOPHIL NFR BLD: 5.9 % (ref 0–6.9)
ERYTHROCYTE [DISTWIDTH] IN BLOOD BY AUTOMATED COUNT: 39.9 FL (ref 35.9–50)
GLOBULIN SER CALC-MCNC: 2.9 G/DL (ref 1.9–3.5)
GLUCOSE SERPL-MCNC: 93 MG/DL (ref 65–99)
HCT VFR BLD AUTO: 43.5 % (ref 42–52)
HGB BLD-MCNC: 15.7 G/DL (ref 14–18)
IMM GRANULOCYTES # BLD AUTO: 0.03 K/UL (ref 0–0.11)
IMM GRANULOCYTES NFR BLD AUTO: 0.4 % (ref 0–0.9)
LYMPHOCYTES # BLD AUTO: 4.4 K/UL (ref 1–4.8)
LYMPHOCYTES NFR BLD: 52.1 % (ref 22–41)
MCH RBC QN AUTO: 32.3 PG (ref 27–33)
MCHC RBC AUTO-ENTMCNC: 36.1 G/DL (ref 33.7–35.3)
MCV RBC AUTO: 89.5 FL (ref 81.4–97.8)
MONOCYTES # BLD AUTO: 0.68 K/UL (ref 0–0.85)
MONOCYTES NFR BLD AUTO: 8.1 % (ref 0–13.4)
NEUTROPHILS # BLD AUTO: 2.75 K/UL (ref 1.82–7.42)
NEUTROPHILS NFR BLD: 32.6 % (ref 44–72)
NRBC # BLD AUTO: 0 K/UL
NRBC BLD-RTO: 0 /100 WBC
PLATELET # BLD AUTO: 167 K/UL (ref 164–446)
PMV BLD AUTO: 9.7 FL (ref 9–12.9)
POTASSIUM SERPL-SCNC: 4.3 MMOL/L (ref 3.6–5.5)
PROT SERPL-MCNC: 6.8 G/DL (ref 6–8.2)
RBC # BLD AUTO: 4.86 M/UL (ref 4.7–6.1)
SODIUM SERPL-SCNC: 137 MMOL/L (ref 135–145)
VALPROATE SERPL-MCNC: 43 UG/ML (ref 50–100)
WBC # BLD AUTO: 8.4 K/UL (ref 4.8–10.8)

## 2019-05-20 PROCEDURE — 80053 COMPREHEN METABOLIC PANEL: CPT

## 2019-05-20 PROCEDURE — 700111 HCHG RX REV CODE 636 W/ 250 OVERRIDE (IP)

## 2019-05-20 PROCEDURE — 96374 THER/PROPH/DIAG INJ IV PUSH: CPT

## 2019-05-20 PROCEDURE — 80164 ASSAY DIPROPYLACETIC ACD TOT: CPT

## 2019-05-20 PROCEDURE — 85025 COMPLETE CBC W/AUTO DIFF WBC: CPT

## 2019-05-20 PROCEDURE — 70551 MRI BRAIN STEM W/O DYE: CPT

## 2019-05-20 PROCEDURE — 96375 TX/PRO/DX INJ NEW DRUG ADDON: CPT

## 2019-05-20 PROCEDURE — 99284 EMERGENCY DEPT VISIT MOD MDM: CPT

## 2019-05-20 RX ORDER — LORAZEPAM 2 MG/ML
INJECTION INTRAMUSCULAR
Status: COMPLETED
Start: 2019-05-20 | End: 2019-05-20

## 2019-05-20 RX ORDER — MIDAZOLAM HYDROCHLORIDE 1 MG/ML
INJECTION INTRAMUSCULAR; INTRAVENOUS
Status: COMPLETED
Start: 2019-05-20 | End: 2019-05-20

## 2019-05-20 RX ADMIN — MIDAZOLAM HYDROCHLORIDE 2 MG: 1 INJECTION, SOLUTION INTRAMUSCULAR; INTRAVENOUS at 18:36

## 2019-05-20 RX ADMIN — LORAZEPAM 0.5 MG: 2 INJECTION INTRAMUSCULAR; INTRAVENOUS at 18:37

## 2019-05-21 NOTE — ED NOTES
2 mg versed iv , 0.5mg ativan iv given  Pt sz activity stopped , opens eyes at this time.  Wife at bedside

## 2019-05-21 NOTE — ED TRIAGE NOTES
Pt from mri scanner  Pt was having sz while having out pt mri  Pt having sz at time of arrival , pt medicated as directed

## 2019-05-21 NOTE — DISCHARGE INSTRUCTIONS
If worse or for any concerns go to [facility]    If you change your mind about admission to the hospital go to [facility]

## 2019-05-21 NOTE — ED PROVIDER NOTES
ED Provider Note    CHIEF COMPLAINT  Chief Complaint   Patient presents with   • Seizure     Pt BIB OP MRI s/p SZ, per family member reports HX of SZ       HPI  Eriberto Young is a 63 y.o. male who presents actively seizing transferred over from radiology where he was receiving an outpatient MR for shunt assessment.  His wife is at the bedside upon my arrival, stating the patient has approximately 1 episode of seizures every month.  He has been compliant with his medication Topamax and Depakote.  His wife states becoming cold or in the presence of machinery sometimes triggers a seizure.  She states he is frequently had seizures lasting longer than 10 minutes in the past.  There is no new trauma.  No recent fever.  Patient apparently felt well this morning.  He has scheduled appointment tomorrow with neurosurgery for evaluation of his shunt.  The patient's wife states the evaluation of the shunt is a routine appointment, not because of new problem    REVIEW OF SYSTEMS    Constitutional: No fever  Respiratory: No shortness of breath  Cardiac: No chest pain  Gastrointestinal: No abdominal pain, no nausea  Musculoskeletal: No neck pain  Neurologic: Headache, seizures.  Patient states headaches are common after seizures  ENT: No tongue bite  Psychiatric history of anxiety       All other systems are negative.       PAST MEDICAL HISTORY  Past Medical History:   Diagnosis Date   • Anxiety    • Chronic pain    • Complex partial epilepsy, intractable (HCC)    • Depression    • Head injuries    • Hydrocephalus     w/ shunt   • Post concussion syndrome 11/10/2014   • Self-injurious behavior    • Suicide attempt (HCC)        FAMILY HISTORY  Family History   Problem Relation Age of Onset   • Alcohol abuse Father    • ADD / ADHD Maternal Grandfather    • ADD / ADHD Maternal Grandmother    • Dementia Maternal Grandmother        SOCIAL HISTORY  Social History     Social History   • Marital status:      Spouse name: N/A   •  "Number of children: N/A   • Years of education: N/A     Social History Main Topics   • Smoking status: Never Smoker   • Smokeless tobacco: Never Used   • Alcohol use 1.2 oz/week     2 Glasses of wine per week   • Drug use: No   • Sexual activity: Not on file     Other Topics Concern   • Not on file     Social History Narrative   • No narrative on file       SURGICAL HISTORY  Past Surgical History:   Procedure Laterality Date   • ABDOMINAL EXPLORATION     • ACHILLES TENDON REPAIR     • CRANIOTOMY     • HERNIA REP HIATAL      times 2   • HERNIA REPAIR     • LAMINOTOMY         CURRENT MEDICATIONS  Home Medications    **Home medications have not yet been reviewed for this encounter**         ALLERGIES  Allergies   Allergen Reactions   • Ativan      High doses   • Hydromorphone Hcl    • Nkda [No Known Drug Allergy]        PHYSICAL EXAM  VITAL SIGNS: /73   Pulse 71   Temp 36.8 °C (98.3 °F) (Temporal)   Resp 16   Ht 1.676 m (5' 6\")   Wt 82.9 kg (182 lb 12.2 oz)   SpO2 94%   BMI 29.50 kg/m²   Constitutional:  Non-toxic appearance.   HENT: No facial swelling, no tongue bite  Eyes: Anicteric, no conjunctivitis.   Pupils are 3 mm bilateral, extraocular movements intact  Cardiovascular: Normal heart rate, Normal rhythm  Pulmonary:  No wheezing, No rales.   Gastrointestinal: Soft, No tenderness, No distention  Skin: Warm, Dry, No cyanosis.   Neurologic: Speech is clear, follows commands, facial expression is symmetrical.  Strength is intact all extremities  Psychiatric: Affect flat,  Mood normal.  Patient is calm and cooperative  Musculoskeletal: Distal nontender    EKG/Labs  Results for orders placed or performed during the hospital encounter of 05/20/19   CBC WITH DIFFERENTIAL   Result Value Ref Range    WBC 8.4 4.8 - 10.8 K/uL    RBC 4.86 4.70 - 6.10 M/uL    Hemoglobin 15.7 14.0 - 18.0 g/dL    Hematocrit 43.5 42.0 - 52.0 %    MCV 89.5 81.4 - 97.8 fL    MCH 32.3 27.0 - 33.0 pg    MCHC 36.1 (H) 33.7 - 35.3 g/dL    " RDW 39.9 35.9 - 50.0 fL    Platelet Count 167 164 - 446 K/uL    MPV 9.7 9.0 - 12.9 fL    Neutrophils-Polys 32.60 (L) 44.00 - 72.00 %    Lymphocytes 52.10 (H) 22.00 - 41.00 %    Monocytes 8.10 0.00 - 13.40 %    Eosinophils 5.90 0.00 - 6.90 %    Basophils 0.90 0.00 - 1.80 %    Immature Granulocytes 0.40 0.00 - 0.90 %    Nucleated RBC 0.00 /100 WBC    Neutrophils (Absolute) 2.75 1.82 - 7.42 K/uL    Lymphs (Absolute) 4.40 1.00 - 4.80 K/uL    Monos (Absolute) 0.68 0.00 - 0.85 K/uL    Eos (Absolute) 0.50 0.00 - 0.51 K/uL    Baso (Absolute) 0.08 0.00 - 0.12 K/uL    Immature Granulocytes (abs) 0.03 0.00 - 0.11 K/uL    NRBC (Absolute) 0.00 K/uL   COMP METABOLIC PANEL   Result Value Ref Range    Sodium 137 135 - 145 mmol/L    Potassium 4.3 3.6 - 5.5 mmol/L    Chloride 105 96 - 112 mmol/L    Co2 22 20 - 33 mmol/L    Anion Gap 10.0 0.0 - 11.9    Glucose 93 65 - 99 mg/dL    Bun 14 8 - 22 mg/dL    Creatinine 0.81 0.50 - 1.40 mg/dL    Calcium 9.2 8.4 - 10.2 mg/dL    AST(SGOT) 21 12 - 45 U/L    ALT(SGPT) 20 2 - 50 U/L    Alkaline Phosphatase 39 30 - 99 U/L    Total Bilirubin 0.4 0.1 - 1.5 mg/dL    Albumin 3.9 3.2 - 4.9 g/dL    Total Protein 6.8 6.0 - 8.2 g/dL    Globulin 2.9 1.9 - 3.5 g/dL    A-G Ratio 1.3 g/dL   VALPROIC ACID   Result Value Ref Range    Valproic Acid 43.0 (L) 50.0 - 100.0 ug/mL   ESTIMATED GFR   Result Value Ref Range    GFR If African American >60 >60 mL/min/1.73 m 2    GFR If Non African American >60 >60 mL/min/1.73 m 2         RADIOLOGY/PROCEDURES  MRI obtained as an outpatient:  Impression         1. Age-related cerebral atrophy.    2. Moderate ventriculomegaly which has a colpocephalic appearance. The degree of ventricular dilatation is not changed since previous head CT scan of 5/19/2019.    3. Right frontal ventriculoperitoneal shunt unchanged in appearance with a small amount of edema surrounding the shunt tube tract.    4. 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         COURSE & MEDICAL DECISION MAKING  Pertinent Labs & Imaging studies reviewed. (See chart for details)  The patient's wife states is common for the patient disease for over 10 minutes.  Patient had 3 separate seizures back to back to back lasting in total approximately 12 minutes.  He has now woken up after being treated with 0.5 mg of Ativan, followed by 2 mg of Versed.  The wife requested long-acting benzodiazepines to be avoided after the first dose.  Patient is now awake stating he wants to go home refusing admission to the hospital.  I discussed risks of this with the patient and his wife, they state he commonly seizes this long and do not want to be admitted, preferring to follow-up tomorrow with her neurosurgeon.  The patient has signed out AGAINST MEDICAL ADVICE    This patient is alert and oriented at time of discussion of AMA, and has demonstrated the capacity to understand the risks of leaving against my medical advice.  These risks have been explained as death or permanent disability.  The patient is aware they can return for any concerns or if they changes their mind about further testing and admission to the hospital.  The patient is aware they should follow up with their doctor as soon as possible.  No medical condition or medication appears to be clouding this patient's judgement at this time.    FINAL IMPRESSION     1. Status epilepticus (HCC)           Critical care time 35 minutes         Electronically signed by: Italo Marino, 5/21/2019 12:28 PM

## 2019-05-21 NOTE — RESPIRATORY CARE
"Respiratory Therapy Update          Called to MRI @ 1800 on 5/20/2019 for \"code assist\" for seizure.Nasal cannula placed on patient with Sp02 in mid 90's.  B/S clear and vitals performed by RN then taken to ER for observation then later discharged home                                                             "

## 2019-05-21 NOTE — ED NOTES
Report received from LAVONNE Garcia. Pt resting with wife at bedside. Pt slightly drowsy but oriented x 4.

## 2019-05-21 NOTE — PROGRESS NOTES
Called by MRI tech Dae to evaluate this pt as pt started having seizure immediately after MRI completed. Pt convulsing and non-responsive. Airway patent, skin pink warm and dry, removed from room on MRI jayce and Code Assist/ Blue called. ER doc arrived with code team and wife to bedside. Wife states pt has frequent seizures, VS- O2 sat 95%, HR 70 and /79. Difficult to assess resp due to convulsions. O2 nasal cannula at 2 L. Decision made by Dr Marino and pt's wife to transfer pt to ER. Transferred via [facility] with Code team.

## 2019-11-22 ENCOUNTER — TELEPHONE (OUTPATIENT)
Dept: NEUROLOGY | Facility: MEDICAL CENTER | Age: 64
End: 2019-11-22

## 2019-12-05 ENCOUNTER — OFFICE VISIT (OUTPATIENT)
Dept: NEUROLOGY | Facility: MEDICAL CENTER | Age: 64
End: 2019-12-05
Payer: COMMERCIAL

## 2019-12-05 VITALS
HEART RATE: 67 BPM | WEIGHT: 188 LBS | BODY MASS INDEX: 30.22 KG/M2 | DIASTOLIC BLOOD PRESSURE: 80 MMHG | TEMPERATURE: 98.2 F | HEIGHT: 66 IN | OXYGEN SATURATION: 96 % | SYSTOLIC BLOOD PRESSURE: 122 MMHG

## 2019-12-05 DIAGNOSIS — F02.80 DEMENTIA ASSOCIATED WITH OTHER UNDERLYING DISEASE WITHOUT BEHAVIORAL DISTURBANCE (HCC): ICD-10-CM

## 2019-12-05 DIAGNOSIS — G43.719 INTRACTABLE CHRONIC MIGRAINE WITHOUT AURA AND WITHOUT STATUS MIGRAINOSUS: ICD-10-CM

## 2019-12-05 DIAGNOSIS — G25.1 TREMOR DUE TO MULTIPLE DRUGS: ICD-10-CM

## 2019-12-05 DIAGNOSIS — G40.219 COMPLEX PARTIAL EPILEPSY, INTRACTABLE (HCC): ICD-10-CM

## 2019-12-05 DIAGNOSIS — S09.90XS ATAXIA DUE TO OLD HEAD INJURY: ICD-10-CM

## 2019-12-05 DIAGNOSIS — R27.0 ATAXIA DUE TO OLD HEAD INJURY: ICD-10-CM

## 2019-12-05 PROCEDURE — 99214 OFFICE O/P EST MOD 30 MIN: CPT | Performed by: PSYCHIATRY & NEUROLOGY

## 2019-12-05 NOTE — ASSESSMENT & PLAN NOTE
Pt had a breakthrough seizure on a plane and was diverted to [place] on his way to [place]. Pt states he was going to see his father as was under some stress. Pt has about 2-4 seizures per month and brings in a seizure and HA calendar.

## 2019-12-05 NOTE — ASSESSMENT & PLAN NOTE
Pt states that he has been getting a little worse with the weather and the death of his father. The Namenda helps a bit. Pt is doing a lot more sleeping. Pt is trying to do research on RV to travel [place] for the winter.

## 2019-12-05 NOTE — PROGRESS NOTES
CHIEF COMPLAINT  Chief Complaint   Patient presents with   • Follow-Up     [facility] paperwork       HPI  Eriberto Young is a 60 y.o. male who presents for treatment of multiple types of headaches with concerns about his memory being a little worse since the colder weather has aggravated his depression. Pt has more headaches with cold/windy days.  Headache, chronic migraine without aura, intractable  Pt is doing a little better with the emgality. Pt states that he is still affected by barometric pressure changes.    Tremor due to multiple drugs  It is still there but better on the lower dose of depakote. Pt states he notices it while eating but does not stop him.    Dementia associated with other underlying disease without behavioral disturbance  Pt states that he has been getting a little worse with the weather and the death of his father. The Namenda helps a bit. Pt is doing a lot more sleeping. Pt is trying to do research on RV to travel [place] for the winter.    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 Pt states that he has had occasional falls.    Complex partial epilepsy, intractable  Pt had a breakthrough seizure on a plane and was diverted to [place] on his way to [place]. Pt states he was going to see his father as was under some stress. Pt has about 2-4 seizures per month and brings in a seizure and HA calendar.    REVIEW OF SYSTEMS  Pertinent Positives: Memory loss and cognitive change,  Mood changes and depression.Gait imbalance, pseudoseizure.   All other systems are negative.     PAST MEDICAL HISTORY  Past Medical History:   Diagnosis Date   • Anxiety    • Chronic pain    • Complex partial epilepsy, intractable (HCC)    • Depression    • Head injuries    • Hydrocephalus (HCC)     w/ shunt   • Post concussion syndrome 11/10/2014   • Self-injurious behavior    • Suicide attempt (HCC)        SOCIAL HISTORY  Social History     Socioeconomic History   • Marital status:      Spouse  name: Not on file   • Number of children: Not on file   • Years of education: Not on file   • Highest education level: Not on file   Occupational History   • Not on file   Social Needs   • Financial resource strain: Not on file   • Food insecurity:     Worry: Not on file     Inability: Not on file   • Transportation needs:     Medical: Not on file     Non-medical: Not on file   Tobacco Use   • Smoking status: Never Smoker   • Smokeless tobacco: Never Used   Substance and Sexual Activity   • Alcohol use: Yes     Alcohol/week: 1.2 oz     Types: 2 Glasses of wine per week   • Drug use: No   • Sexual activity: Not on file   Lifestyle   • Physical activity:     Days per week: Not on file     Minutes per session: Not on file   • Stress: Not on file   Relationships   • Social connections:     Talks on phone: Not on file     Gets together: Not on file     Attends Yarsani service: Not on file     Active member of club or organization: Not on file     Attends meetings of clubs or organizations: Not on file     Relationship status: Not on file   • Intimate partner violence:     Fear of current or ex partner: Not on file     Emotionally abused: Not on file     Physically abused: Not on file     Forced sexual activity: Not on file   Other Topics Concern   • Not on file   Social History Narrative   • Not on file       SURGICAL HISTORY  Past Surgical History:   Procedure Laterality Date   • ABDOMINAL EXPLORATION     • ACHILLES TENDON REPAIR     • CRANIOTOMY     • HERNIA REP HIATAL      times 2   • HERNIA REPAIR     • LAMINOTOMY         CURRENT MEDICATIONS  Current Outpatient Medications   Medication Sig Dispense Refill   • EMGALITY 120 MG/ML Solution Auto-injector 120 mg by Injection route.     • DULoxetine (CYMBALTA) 30 MG Cap DR Particles Take 30 mg by mouth every day.     • Mirabegron ER 50 MG TABLET SR 24 HR Take  by mouth.     • Memantine HCl (NAMENDA) 2 MG/ML Solution Take  by mouth.     • oxyCODONE-acetaminophen  "(PERCOCET) 5-325 MG Tab Take 1-2 Tabs by mouth every four hours as needed.     • Ibuprofen 200 MG Cap None Entered     • asa/apap/caffeine (EXCEDRIN) 250-250-65 MG Tab Take  by mouth.     • divalproex ER (DEPAKOTE ER) 500 MG TABLET SR 24 HR Take 500 mg by mouth 2 Times a Day.     • BOTOX 200 UNITS Recon Soln      • pantoprazole (PROTONIX) 40 MG Tablet Delayed Response TAKE 1 TABLET BY MOUTH DAILY, 30 MIN BEFORE BREAKFAST  1   • ketorolac (TORADOL) 10 MG Tab Take 1 Tab by mouth every four hours as needed. 10 Tab 5   • acetaminophen/caffeine/butalbital 325-40-50 mg (FIORICET) -40 MG Tab Take 1 Tab by mouth every four hours as needed for Headache. 30 Tab 1   • topiramate (TOPAMAX) 50 MG tablet TAKE 1 TABLET BY MOUTH TWICE A  Tab 3   • hydrocodone-acetaminophen (NORCO) 5-325 MG TABS per tablet Take 1-2 Tabs by mouth every four hours as needed. 30 Tab 5   • omega-3 acid ethyl esters (LOVAZA) 1 GM capsule Take 1,000 mg by mouth every day.     • trazodone (DESYREL) 50 MG TABS Take 50 mg by mouth as needed.     • M-VIT PO      • Memantine HCl ER (NAMENDA) 28 MG CAPSULE SR 24 HR TAKE 1 CAPSULE BY MOUTH EVERY DAY (Patient not taking: Reported on 5/13/2019) 90 Cap 2   • Erenumab (AIMOVIG) 70 MG/ML Solution Auto-injector Inject 70 mg as instructed Once.     • FLUARIX QUADRIVALENT 0.5 ML Suspension Prefilled Syringe injection TO BE ADMINISTERED BY PHARMACIST FOR IMMUNIZATION  0     No current facility-administered medications for this visit.        ALLERGIES  Allergies   Allergen Reactions   • Ativan      High doses   • Hydromorphone Hcl    • Nkda [No Known Drug Allergy]        PHYSICAL EXAM  VITAL SIGNS: /80 (BP Location: Left arm, Patient Position: Sitting, BP Cuff Size: Adult)   Pulse 67   Temp 36.8 °C (98.2 °F) (Temporal)   Ht 1.676 m (5' 6\")   Wt 85.3 kg (188 lb)   SpO2 96%   BMI 30.34 kg/m²   Constitutional: Well developed, Well nourished, No acute distress, Non-toxic appearance.   HENT: Shunt " placement is palpated and nontender  Eyes: Fundi disc sharp, start her movements intact  Neck: Normal range of motion, No tenderness, Supple, No stridor.   Cardiovascular: Normal heart rate, Normal rhythm, No murmurs, No rubs, No gallops.   Thorax & Lungs: Normal breath sounds, No respiratory distress, No wheezing, No chest tenderness.   Abdomen: Bowel sounds normal, Soft, No tenderness, No masses, No pulsatile masses.   Skin: Warm, Dry, No erythema, No rash.   Back: No tenderness, No CVA tenderness.   Extremities: Intact distal pulses, No edema, No tenderness, No cyanosis, No clubbing.   Neurologic: Alert and oriented x3, cranial nerves II through XII intact, motor exam:  Psychiatric: Affect normal, Judgment normal, Mood normal.   Lab: Reviewed with patient in detail and as noted in results.    EEG  EEG was normal and patient had nonepileptic events during the evaluation.      ASSESSMENT AND PLAN  1. Chronic migraine without aura, with intractable migraine, so stated, without mention of status migrainosus  Plan is to continue propranolol. He is experiencing headaches every day that are migrainous. Patient has received benefit from Botox and external nerve blocks from his other specialists. Plan to continue Depakote for headache prevention in addition to Topamax.  - propranolol LA (INDERAL LA) 60 MG CP24; Take 1 Cap by mouth every day.  Dispense: 30 Cap; Refill: 11  Pt is also on Aimovig. Pt is going to try CBD oils. Pt states that he sees Dr. Henriquez.    2. Tremor due to multiple drugs  Patient also has a slight stammer and hand action tremor. Pt take propranolo at night for tremor    3. Dementia from underlying medical problem related to head injury  Pt is taking Namenda generic and he has been stable.    4. Ataxia from underlying head injury  Pt is trying to decorate for appbackr. Pt has to be careful with falls.    5. Complex partial epilepsy intractable    Continue refilling Depakote and Topamax. Continue  Depakote at 500mg for seizure control.  Pt at times will have non-epiletic seizures.    I filled out disablity paperwork for the patient today.     I spent 40 minutes with this patient and his wife face to face, over fifty percent was spent counseling patient on their condition, best management practices, reviewing test results and risks and benefits of treatment.

## 2019-12-05 NOTE — ASSESSMENT & PLAN NOTE
Pt is doing a little better with the emgality. Pt states that he is still affected by barometric pressure changes.

## 2019-12-05 NOTE — ASSESSMENT & PLAN NOTE
It is still there but better on the lower dose of depakote. Pt states he notices it while eating but does not stop him.

## 2019-12-06 ENCOUNTER — TELEPHONE (OUTPATIENT)
Dept: NEUROLOGY | Facility: MEDICAL CENTER | Age: 64
End: 2019-12-06

## 2019-12-30 RX ORDER — DIVALPROEX SODIUM 500 MG/1
TABLET, EXTENDED RELEASE ORAL
Qty: 540 TAB | Refills: 0 | Status: SHIPPED | OUTPATIENT
Start: 2019-12-30 | End: 2020-04-07 | Stop reason: SDUPTHER

## 2019-12-30 NOTE — TELEPHONE ENCOUNTER
Was the patient seen in the last year in this department? Yes    Does patient have an active prescription for medications requested? Yes    Received Request Via: Pharmacy     Dr Bloch pt requesting seizure meds. Dr Bloch is out of the office until 1/13/20

## 2020-04-07 RX ORDER — DIVALPROEX SODIUM 500 MG/1
TABLET, EXTENDED RELEASE ORAL
Qty: 540 TAB | Refills: 0 | Status: SHIPPED | OUTPATIENT
Start: 2020-04-07 | End: 2020-05-05

## 2020-05-05 ENCOUNTER — OFFICE VISIT (OUTPATIENT)
Dept: NEUROLOGY | Facility: MEDICAL CENTER | Age: 65
End: 2020-05-05
Payer: COMMERCIAL

## 2020-05-05 VITALS
TEMPERATURE: 98 F | WEIGHT: 198.41 LBS | HEIGHT: 66 IN | OXYGEN SATURATION: 95 % | HEART RATE: 66 BPM | DIASTOLIC BLOOD PRESSURE: 76 MMHG | SYSTOLIC BLOOD PRESSURE: 116 MMHG | BODY MASS INDEX: 31.89 KG/M2

## 2020-05-05 DIAGNOSIS — R27.0 ATAXIA DUE TO OLD HEAD INJURY: ICD-10-CM

## 2020-05-05 DIAGNOSIS — G43.719 INTRACTABLE CHRONIC MIGRAINE WITHOUT AURA AND WITHOUT STATUS MIGRAINOSUS: ICD-10-CM

## 2020-05-05 DIAGNOSIS — G25.1 TREMOR DUE TO MULTIPLE DRUGS: ICD-10-CM

## 2020-05-05 DIAGNOSIS — G40.219 COMPLEX PARTIAL EPILEPSY, INTRACTABLE (HCC): ICD-10-CM

## 2020-05-05 DIAGNOSIS — F02.80 DEMENTIA ASSOCIATED WITH OTHER UNDERLYING DISEASE WITHOUT BEHAVIORAL DISTURBANCE (HCC): ICD-10-CM

## 2020-05-05 DIAGNOSIS — S09.90XS ATAXIA DUE TO OLD HEAD INJURY: ICD-10-CM

## 2020-05-05 PROCEDURE — 99214 OFFICE O/P EST MOD 30 MIN: CPT | Performed by: PSYCHIATRY & NEUROLOGY

## 2020-05-05 RX ORDER — TOPIRAMATE 50 MG/1
TABLET, FILM COATED ORAL
Qty: 180 TAB | Refills: 3 | Status: SHIPPED | OUTPATIENT
Start: 2020-05-05 | End: 2021-05-03 | Stop reason: SDUPTHER

## 2020-05-05 RX ORDER — MEMANTINE HYDROCHLORIDE 28 MG/1
28 CAPSULE, EXTENDED RELEASE ORAL
Qty: 90 CAP | Refills: 3 | Status: SHIPPED | OUTPATIENT
Start: 2020-05-05 | End: 2021-05-03 | Stop reason: SDUPTHER

## 2020-05-05 RX ORDER — DIVALPROEX SODIUM 500 MG/1
TABLET, EXTENDED RELEASE ORAL
Qty: 540 TAB | Refills: 0 | Status: SHIPPED | OUTPATIENT
Start: 2020-05-05 | End: 2021-12-06

## 2020-05-05 RX ORDER — ZIPRASIDONE HYDROCHLORIDE 40 MG/1
40 CAPSULE ORAL 2 TIMES DAILY
COMMUNITY
End: 2021-05-03

## 2020-05-05 ASSESSMENT — PATIENT HEALTH QUESTIONNAIRE - PHQ9
CLINICAL INTERPRETATION OF PHQ2 SCORE: 3
5. POOR APPETITE OR OVEREATING: 1 - SEVERAL DAYS
SUM OF ALL RESPONSES TO PHQ QUESTIONS 1-9: 10

## 2020-05-05 ASSESSMENT — FIBROSIS 4 INDEX: FIB4 SCORE: 1.74

## 2020-05-06 NOTE — PROGRESS NOTES
CHIEF COMPLAINT  Chief Complaint   Patient presents with   • Follow-Up     migraines       HPI  Eriberto Young is a 60 y.o. male who presents for treatment of multiple types of headaches with concerns about his memory being a little worse since the colder weather has aggravated his depression. Pt has more headaches with cold/windy days throughout the winter but that has improved as the weather has improved.  He is excited about spending the summer in [place].  Headache, chronic migraine without aura, intractable  Pt headaches helped with the BOTOX and emgality.    Tremor due to multiple drugs  Related to depakote but seizures have been very got.    Dementia associated with other underlying disease without behavioral disturbance  Pt had accident in 2007. Pt is doing well with the memantine.    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    Complex partial epilepsy, intractable  Pt has not had a seizure in one month.    REVIEW OF SYSTEMS  Pertinent Positives: Memory loss and cognitive change,  Mood changes and depression.Gait imbalance, pseudoseizure.   All other systems are negative.     PAST MEDICAL HISTORY  Past Medical History:   Diagnosis Date   • Anxiety    • Chronic pain    • Complex partial epilepsy, intractable (HCC)    • Depression    • Head injuries    • Hydrocephalus (HCC)     w/ shunt   • Post concussion syndrome 11/10/2014   • Self-injurious behavior    • Suicide attempt (HCC)        SOCIAL HISTORY  Social History     Socioeconomic History   • Marital status:      Spouse name: Not on file   • Number of children: Not on file   • Years of education: Not on file   • Highest education level: Not on file   Occupational History   • Not on file   Social Needs   • Financial resource strain: Not on file   • Food insecurity     Worry: Not on file     Inability: Not on file   • Transportation needs     Medical: Not on file     Non-medical: Not on file   Tobacco Use   • Smoking status:  Never Smoker   • Smokeless tobacco: Never Used   Substance and Sexual Activity   • Alcohol use: Yes     Alcohol/week: 1.2 oz     Types: 2 Glasses of wine per week   • Drug use: No   • Sexual activity: Not on file   Lifestyle   • Physical activity     Days per week: Not on file     Minutes per session: Not on file   • Stress: Not on file   Relationships   • Social connections     Talks on phone: Not on file     Gets together: Not on file     Attends Scientologist service: Not on file     Active member of club or organization: Not on file     Attends meetings of clubs or organizations: Not on file     Relationship status: Not on file   • Intimate partner violence     Fear of current or ex partner: Not on file     Emotionally abused: Not on file     Physically abused: Not on file     Forced sexual activity: Not on file   Other Topics Concern   • Not on file   Social History Narrative   • Not on file       SURGICAL HISTORY  Past Surgical History:   Procedure Laterality Date   • ABDOMINAL EXPLORATION     • ACHILLES TENDON REPAIR     • CRANIOTOMY     • HERNIA REP HIATAL      times 2   • HERNIA REPAIR     • LAMINOTOMY         CURRENT MEDICATIONS  Current Outpatient Medications   Medication Sig Dispense Refill   • ziprasidone (GEODON) 40 MG Cap Take 40 mg by mouth 2 Times a Day.     • Memantine HCl ER (NAMENDA) 28 MG CAPSULE SR 24 HR Take 1 Cap by mouth every day. 90 Cap 3   • divalproex ER (DEPAKOTE ER) 500 MG TABLET SR 24 HR Take 1  Tab 0   • topiramate (TOPAMAX) 50 MG tablet TAKE 1 TABLET BY MOUTH TWICE A  Tab 3   • EMGALITY 120 MG/ML Solution Auto-injector 120 mg by Injection route.     • DULoxetine (CYMBALTA) 30 MG Cap DR Particles Take 60 mg by mouth every day.     • Mirabegron ER 50 MG TABLET SR 24 HR Take  by mouth.     • oxyCODONE-acetaminophen (PERCOCET) 5-325 MG Tab Take 1-2 Tabs by mouth every four hours as needed.     • Ibuprofen 200 MG Cap None Entered     • asa/apap/caffeine (EXCEDRIN) 250-250-65 MG  "Tab Take  by mouth.     • BOTOX 200 UNITS Recon Soln      • acetaminophen/caffeine/butalbital 325-40-50 mg (FIORICET) -40 MG Tab Take 1 Tab by mouth every four hours as needed for Headache. 30 Tab 1   • hydrocodone-acetaminophen (NORCO) 5-325 MG TABS per tablet Take 1-2 Tabs by mouth every four hours as needed. 30 Tab 5   • omega-3 acid ethyl esters (LOVAZA) 1 GM capsule Take 1,000 mg by mouth every day.     • trazodone (DESYREL) 50 MG TABS Take 50 mg by mouth as needed.     • pantoprazole (PROTONIX) 40 MG Tablet Delayed Response TAKE 1 TABLET BY MOUTH DAILY, 30 MIN BEFORE BREAKFAST  1   • M-VIT PO        No current facility-administered medications for this visit.        ALLERGIES  Allergies   Allergen Reactions   • Ativan      High doses   • Hydromorphone Hcl    • Nkda [No Known Drug Allergy]        PHYSICAL EXAM  VITAL SIGNS: /76 (BP Location: Left arm, Patient Position: Sitting, BP Cuff Size: Adult)   Pulse 66   Temp 36.7 °C (98 °F) (Temporal)   Ht 1.676 m (5' 6\")   Wt 90 kg (198 lb 6.6 oz)   SpO2 95%   BMI 32.02 kg/m²   Constitutional: Well developed, Well nourished, No acute distress, Non-toxic appearance.   HENT: Shunt placement is palpated and nontender  Eyes: Fundi disc sharp, start her movements intact  Neck: Normal range of motion, No tenderness, Supple, No stridor.   Cardiovascular: Normal heart rate, Normal rhythm, No murmurs, No rubs, No gallops.   Thorax & Lungs: Normal breath sounds, No respiratory distress, No wheezing, No chest tenderness.   Abdomen: Bowel sounds normal, Soft, No tenderness, No masses, No pulsatile masses.   Skin: Warm, Dry, No erythema, No rash.   Back: No tenderness, No CVA tenderness.   Extremities: Intact distal pulses, No edema, No tenderness, No cyanosis, No clubbing.   Neurologic: Alert and oriented x3, cranial nerves II through XII intact, motor exam:  Psychiatric: Affect normal, Judgment normal, Mood normal.   Lab: Reviewed with patient in detail and as " noted in results.    EEG  EEG was normal and patient had nonepileptic events during the evaluation.      ASSESSMENT AND PLAN  1. Chronic migraine without aura, with intractable migraine, so stated, without mention of status migrainosus  Plan is to continue propranolol. He is experiencing headaches every day that are migrainous. Patient has received benefit from Botox and external nerve blocks from his other specialists. Plan to continue Depakote for headache prevention in addition to Topamax.  - propranolol LA (INDERAL LA) 60 MG CP24; Take 1 Cap by mouth every day.  Dispense: 30 Cap; Refill: 11  Pt is also on Aimovig. Pt is going to try CBD oils. Pt states that he sees Dr. Henriquez.    2. Tremor due to multiple drugs  Patient also has a slight stammer and hand action tremor. Pt take propranolol at night for tremor    3. Dementia from underlying medical problem related to head injury  Pt is taking Namenda generic and he has been stable.    4. Ataxia from underlying head injury  Pt is trying to get more exercise and as he is working in the garden and walking dogs as the weather improves.  He is looking forward to spending his summer outside and that is when he usually feels better.    5. Complex partial epilepsy intractable    Continue refilling Depakote and Topamax. Continue Depakote at 500mg for seizure control.  Pt at times will have non-epiletic seizures.    We discussed the importance of stress management for controlling his seizures.     I spent 40 minutes with this patient and his wife face to face, over fifty percent was spent counseling patient on their condition, best management practices, reviewing test results and risks and benefits of treatment.

## 2020-07-06 RX ORDER — DIVALPROEX SODIUM 500 MG/1
TABLET, EXTENDED RELEASE ORAL
Qty: 540 TAB | Refills: 0 | OUTPATIENT
Start: 2020-07-06

## 2020-07-09 DIAGNOSIS — G43.719 INTRACTABLE CHRONIC MIGRAINE WITHOUT AURA AND WITHOUT STATUS MIGRAINOSUS: ICD-10-CM

## 2020-07-09 RX ORDER — KETOROLAC TROMETHAMINE 10 MG/1
10 TABLET, FILM COATED ORAL EVERY 4 HOURS PRN
Qty: 10 TAB | Refills: 5 | Status: SHIPPED | OUTPATIENT
Start: 2020-07-09 | End: 2020-07-10 | Stop reason: SDUPTHER

## 2020-07-10 DIAGNOSIS — G43.719 INTRACTABLE CHRONIC MIGRAINE WITHOUT AURA AND WITHOUT STATUS MIGRAINOSUS: ICD-10-CM

## 2020-07-10 RX ORDER — KETOROLAC TROMETHAMINE 10 MG/1
10 TABLET, FILM COATED ORAL EVERY 4 HOURS PRN
Qty: 10 TAB | Refills: 5 | Status: SHIPPED | OUTPATIENT
Start: 2020-07-10 | End: 2020-07-10

## 2020-07-10 RX ORDER — KETOROLAC TROMETHAMINE 10 MG/1
10 TABLET, FILM COATED ORAL EVERY 4 HOURS PRN
Qty: 10 TAB | Refills: 5 | Status: SHIPPED | OUTPATIENT
Start: 2020-07-10 | End: 2023-05-17 | Stop reason: SDUPTHER

## 2020-11-02 ENCOUNTER — OFFICE VISIT (OUTPATIENT)
Dept: NEUROLOGY | Facility: MEDICAL CENTER | Age: 65
End: 2020-11-02
Payer: COMMERCIAL

## 2020-11-02 VITALS
BODY MASS INDEX: 32.6 KG/M2 | TEMPERATURE: 97.6 F | DIASTOLIC BLOOD PRESSURE: 80 MMHG | WEIGHT: 202.82 LBS | HEART RATE: 68 BPM | OXYGEN SATURATION: 97 % | SYSTOLIC BLOOD PRESSURE: 122 MMHG | HEIGHT: 66 IN

## 2020-11-02 DIAGNOSIS — G43.719 INTRACTABLE CHRONIC MIGRAINE WITHOUT AURA AND WITHOUT STATUS MIGRAINOSUS: ICD-10-CM

## 2020-11-02 DIAGNOSIS — G25.1 TREMOR DUE TO MULTIPLE DRUGS: ICD-10-CM

## 2020-11-02 DIAGNOSIS — F02.80 DEMENTIA ASSOCIATED WITH OTHER UNDERLYING DISEASE WITHOUT BEHAVIORAL DISTURBANCE (HCC): ICD-10-CM

## 2020-11-02 DIAGNOSIS — S09.90XS ATAXIA DUE TO OLD HEAD INJURY: ICD-10-CM

## 2020-11-02 DIAGNOSIS — G40.219 COMPLEX PARTIAL EPILEPSY, INTRACTABLE (HCC): ICD-10-CM

## 2020-11-02 DIAGNOSIS — R27.0 ATAXIA DUE TO OLD HEAD INJURY: ICD-10-CM

## 2020-11-02 PROCEDURE — 99215 OFFICE O/P EST HI 40 MIN: CPT | Performed by: PSYCHIATRY & NEUROLOGY

## 2020-11-02 ASSESSMENT — FIBROSIS 4 INDEX: FIB4 SCORE: 1.77

## 2020-11-02 NOTE — ASSESSMENT & PLAN NOTE
Pt has been walking carefully and he was able travel and camp some over the summer. Pt uses a cane sometime.

## 2020-11-02 NOTE — ASSESSMENT & PLAN NOTE
Pt states that he has not had any seizures and is doing ok with the depakote and the topamax. Pt has been taking his medications as directed.

## 2020-11-02 NOTE — PROGRESS NOTES
CHIEF COMPLAINT  Chief Complaint   Patient presents with   • Follow-Up     migraines       HPI  Eriberto Young is a 60 y.o. male who presents for treatment of multiple types of headaches with concerns about his memory being a little worse with time. Pt feels like the tremor has slowed him down a bit. Pt is working on a journal of the [place] trip her took last month. Pt is going to try and exercise more.  Headache, chronic migraine without aura, intractable  Pt states that his headaches are better with the emgality and the botox.    Tremor due to multiple drugs  Tremor got worse with the increase in his psych medications and he is going to taper those doses.    Dementia associated with other underlying disease without behavioral disturbance  Pt has some memory issues still and he feels like he forgets.    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 Pt uses a cane sometime.    Complex partial epilepsy, intractable  Pt states that he has not had any seizures and is doing ok with the depakote and the topamax. Pt has been taking his medications as directed.    REVIEW OF SYSTEMS  Pertinent Positives: Memory loss, cognitive change and depression.Gait imbalance, pseudoseizure.   All other systems are negative.     PAST MEDICAL HISTORY  Past Medical History:   Diagnosis Date   • Anxiety    • Chronic pain    • Complex partial epilepsy, intractable (HCC)    • Depression    • Head injuries    • Hydrocephalus (HCC)     w/ shunt   • Post concussion syndrome 11/10/2014   • Self-injurious behavior    • Suicide attempt (HCC)        SOCIAL HISTORY  Social History     Socioeconomic History   • Marital status:      Spouse name: Not on file   • Number of children: Not on file   • Years of education: Not on file   • Highest education level: Not on file   Occupational History   • Not on file   Social Needs   • Financial resource strain: Not on file   • Food  insecurity     Worry: Not on file     Inability: Not on file   • Transportation needs     Medical: Not on file     Non-medical: Not on file   Tobacco Use   • Smoking status: Never Smoker   • Smokeless tobacco: Never Used   Substance and Sexual Activity   • Alcohol use: Yes     Alcohol/week: 1.2 oz     Types: 2 Glasses of wine per week   • Drug use: No   • Sexual activity: Not on file   Lifestyle   • Physical activity     Days per week: Not on file     Minutes per session: Not on file   • Stress: Not on file   Relationships   • Social connections     Talks on phone: Not on file     Gets together: Not on file     Attends Denominational service: Not on file     Active member of club or organization: Not on file     Attends meetings of clubs or organizations: Not on file     Relationship status: Not on file   • Intimate partner violence     Fear of current or ex partner: Not on file     Emotionally abused: Not on file     Physically abused: Not on file     Forced sexual activity: Not on file   Other Topics Concern   • Not on file   Social History Narrative   • Not on file       SURGICAL HISTORY  Past Surgical History:   Procedure Laterality Date   • ABDOMINAL EXPLORATION     • ACHILLES TENDON REPAIR     • CRANIOTOMY     • HERNIA REP HIATAL      times 2   • HERNIA REPAIR     • LAMINOTOMY         CURRENT MEDICATIONS  Current Outpatient Medications   Medication Sig Dispense Refill   • ketorolac (TORADOL) 10 MG Tab Take 1 Tab by mouth every four hours as needed. 10 Tab 5   • ziprasidone (GEODON) 40 MG Cap Take 40 mg by mouth 2 Times a Day.     • Memantine HCl ER (NAMENDA) 28 MG CAPSULE SR 24 HR Take 1 Cap by mouth every day. 90 Cap 3   • divalproex ER (DEPAKOTE ER) 500 MG TABLET SR 24 HR Take 1  Tab 0   • topiramate (TOPAMAX) 50 MG tablet TAKE 1 TABLET BY MOUTH TWICE A  Tab 3   • EMGALITY 120 MG/ML Solution Auto-injector 120 mg by Injection route.     • DULoxetine (CYMBALTA) 30 MG Cap DR Particles Take 60 mg by  "mouth every day.     • Mirabegron ER 50 MG TABLET SR 24 HR Take  by mouth.     • oxyCODONE-acetaminophen (PERCOCET) 5-325 MG Tab Take 1-2 Tabs by mouth every four hours as needed.     • Ibuprofen 200 MG Cap None Entered     • asa/apap/caffeine (EXCEDRIN) 250-250-65 MG Tab Take  by mouth.     • BOTOX 200 UNITS Recon Soln      • pantoprazole (PROTONIX) 40 MG Tablet Delayed Response TAKE 1 TABLET BY MOUTH DAILY, 30 MIN BEFORE BREAKFAST  1   • acetaminophen/caffeine/butalbital 325-40-50 mg (FIORICET) -40 MG Tab Take 1 Tab by mouth every four hours as needed for Headache. 30 Tab 1   • hydrocodone-acetaminophen (NORCO) 5-325 MG TABS per tablet Take 1-2 Tabs by mouth every four hours as needed. 30 Tab 5   • omega-3 acid ethyl esters (LOVAZA) 1 GM capsule Take 1,000 mg by mouth every day.     • trazodone (DESYREL) 50 MG TABS Take 50 mg by mouth as needed.     • M-VIT PO        No current facility-administered medications for this visit.        ALLERGIES  Allergies   Allergen Reactions   • Ativan      High doses   • Hydromorphone Hcl    • Nkda [No Known Drug Allergy]        PHYSICAL EXAM  VITAL SIGNS: /80 (BP Location: Left arm, Patient Position: Sitting, BP Cuff Size: Adult)   Pulse 68   Temp 36.4 °C (97.6 °F) (Temporal)   Ht 1.676 m (5' 6\")   Wt 92 kg (202 lb 13.2 oz)   SpO2 97%   BMI 32.74 kg/m²   Constitutional: Well developed, Well nourished, No acute distress, Non-toxic appearance.   HENT: Shunt placement is palpated and nontender  Eyes: Fundi disc sharp, start her movements intact  Neck: Normal range of motion, No tenderness, Supple, No stridor.   Cardiovascular: Normal heart rate, Normal rhythm, No murmurs, No rubs, No gallops.   Thorax & Lungs: Normal breath sounds, No respiratory distress, No wheezing, No chest tenderness.   Abdomen: Bowel sounds normal, Soft, No tenderness, No masses, No pulsatile masses.   Skin: Warm, Dry, No erythema, No rash.   Back: No tenderness, No CVA tenderness. "   Extremities: Intact distal pulses, No edema, No tenderness, No cyanosis, No clubbing.   Neurologic: Alert and oriented x3, cranial nerves II through XII intact, motor exam: tremor resting and action but comes and goes some with his attention, strength is full, sensory;no abnormal in LT or temperature, gait broad based and cannot tandem,   Psychiatric: Affect normal, Judgment normal, Mood normal.   Lab: Reviewed with patient in detail and as noted in results.    EEG  EEG was normal and patient had nonepileptic events during the evaluation.     MRI  Brain from 2019:       AMINATION:  5/20/2019 5:23 PM     HISTORY/REASON FOR EXAM:  Hydrocephalus, communicating; s/p shunt     TECHNIQUE/EXAM DESCRIPTION:  MRI of the brain without and with contrast.     T1 sagittal, T2 fast spin-echo axial, T1 coronal, FLAIR coronal, Diffusion weighted and Apparent Diffusion Coefficient (ADC map) axial images were obtained of the whole brain. T1 post-contrast axial and T1 post-contrast coronal images were obtained.     The study was performed on a [employer] 1.5 [employer] MRI scanner.     COMPARISON: MRI scan of the brain 10/8/2008 and head CT 5/19/2013     FINDINGS:     The calvariae are normal.  There are no extra-axial fluid collections.  There is a right frontal ventriculoperitoneal shunt in place coursing through the right frontal horn. There is T2 signal intensity surrounding the shunt tube tract     The ventricular system is moderately dilated and has a colpocephalic appearance. 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 Further there is chronic white matter loss in the parietal and occipital lobes. There is mild-to-moderate   prominence of the cortical sulci and gyri.  There are a few scattered punctate and patchy areas of increased T2 signal intensity in the periventricular white matter.  There are no mass effects or shift of midline structures.     The diffusion weighted images are unremarkable.     There are no  hemorrhagic lesions.  The brainstem and posterior fossa structures are unremarkable.     Vascular flow voids in the carotid and vertebrobasilar arteries, [place], and dural venous sinuses are intact.     The paranasal sinuses and mastoid air cells are free of mucosal inflammatory change.        IMPRESSION:        1. Age-related cerebral atrophy.     2. Moderate ventriculomegaly which has a colpocephalic appearance. The degree of ventricular dilatation is not changed since previous head CT scan of 5/19/2019.     3. Right frontal ventriculoperitoneal shunt unchanged in appearance with a small amount of edema surrounding the shunt tube tract.     4. 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           ASSESSMENT AND PLAN  1. Chronic migraine without aura, with intractable migraine, so stated, without mention of status migrainosus  Plan is to continue propranolol. He is experiencing headaches every day that are migrainous. Patient has received benefit from Botox and external nerve blocks from his other specialists. Plan to continue Depakote for headache prevention in addition to Topamax.  - propranolol LA (INDERAL LA) 60 MG CP24; Take 1 Cap by mouth every day.  Dispense: 30 Cap; Refill: 11  Pt is also on Emgality. Pt is going to try CBD oils. Pt states that he goes to [facility] his BOTOX.    2. Tremor due to multiple drugs  Patient also has a slight stammer and hand action tremor. Pt is taking propranolol at night for tremor and is adjusting the psych medication diagnosing to see if it helps decrease the tremor. We discussed weighted silverware and glasses.    3. Dementia from underlying medical problem related to head injury  Pt is taking Namenda generic and he has been stable. Plan to increase exercise. Pt is sleeping ok with the trazadone.    4. Ataxia from underlying head injury  Pt is trying to get more exercise walking  his dogs.    5. Complex partial epilepsy intractable    Continue refilling Depakote and Topamax. Continue Depakote at 500mg for seizure control.  Pt at times will have non-epiletic seizures bit has been feeling better with his counseling and he has not had as many non-epileptic seizures as previously.    We discussed the importance of stress management for controlling his seizures.     I spent 40 minutes with this patient and his wife face to face, over fifty percent was spent counseling patient on their condition, best management practices, reviewing test results and risks and benefits of treatment.

## 2020-12-30 ENCOUNTER — HOSPITAL ENCOUNTER (OUTPATIENT)
Dept: HOSPITAL 8 - CFH | Age: 65
Discharge: HOME | End: 2020-12-30
Attending: PSYCHIATRY & NEUROLOGY
Payer: COMMERCIAL

## 2020-12-30 DIAGNOSIS — G20: ICD-10-CM

## 2020-12-30 DIAGNOSIS — M54.81: ICD-10-CM

## 2020-12-30 DIAGNOSIS — G91.1: ICD-10-CM

## 2020-12-30 DIAGNOSIS — J32.3: Primary | ICD-10-CM

## 2020-12-30 PROCEDURE — 70553 MRI BRAIN STEM W/O & W/DYE: CPT

## 2020-12-30 PROCEDURE — A9575 INJ GADOTERATE MEGLUMI 0.1ML: HCPCS

## 2021-01-12 ENCOUNTER — HOSPITAL ENCOUNTER (OUTPATIENT)
Dept: RADIOLOGY | Facility: MEDICAL CENTER | Age: 66
End: 2021-01-12
Payer: COMMERCIAL

## 2021-01-12 ENCOUNTER — TELEPHONE (OUTPATIENT)
Dept: NEUROLOGY | Facility: MEDICAL CENTER | Age: 66
End: 2021-01-12

## 2021-01-12 NOTE — TELEPHONE ENCOUNTER
Melissa P Bloch, M.D.  Allison Mckeon, Med Ass't             Can you please check with the patient to see if he has responded to the sinimet? Thanks Dr Bloch

## 2021-01-12 NOTE — TELEPHONE ENCOUNTER
Pt dc Sinemet, it was giving him side effects. Tried it for 1-2 weeks  He was very dizzy, nauseous, and noticed increased trembling. He has been off of the med for 2 weeks or more and much better.  They called Dr Parrish and he said to dc and follow up with dr Bloch.   MRI was done and dr Henriquez said it looked stable, possible sinus on left side.  [facility] will push images to [employer] system of the MRI    Pt has appt for follow up with dr Bloch 5/3/21

## 2021-02-25 NOTE — Clinical Note
CARDIOLOGY PROGRESS NOTE       SUBJECTIVE   Faina Avalos is a 58 year old female who has a history of anemia and mild to moderate mitral regurgitation who presents for follow up. Since her last visit on 6/24/2019 the patient states that she has been feeling well. The patient denies any chest pains, palpitations, or shortness of breath. The patient denies any lightheadedness or dizziness. There is no orthopnea or PND. No other complaints at this time.    Stress Test 02/22/2021:  This stress test is within normal limits and is negative for ischemia.  At the time of this dictation, the Cardiolite portion is still pending.  Correlation with nuclear scan is recommended.   Nuclear Portion:  1.  No evidence of Regadenoson induced ischemia.  2.  No evidence of previous myocardial injury pattern.  3.  Normal left ventricular size with satisfactory resting systolic performance.    Echocardiogram 02/22/2021: EF 57%  Aortic valve sclerosis.  Mild-to-moderate mitral valve regurgitation.  Mild tricuspid valve regurgitation.  Mildly dilated ascending aorta 4.2 cm.  Normal left ventricular size, systolic function and wall thickness, with no regional wall motion abnormalities.    Echocardiogram 5/9/2018  Normal left ventricular size and systolic function, EF 62 %.  Mild mitral valve regurgitation  [place] dilated ascending aorta 3.9 cm    Ultrasound aorta 06/15/2018:  IMPRESSION:  Normal ultrasound scans of the abdominal aorta.         Echocardiogram 05/09/2018:  Normal left ventricular size and systolic function, EF 62%.  Mild mitral valve regurgitation.    Echocardiogram done on 06/26/2015 revealed mild to moderate mitral valve regurgitation with trace to mild tricuspid regurgitation with normal left ventricular systolic function with ejection fraction of 69%.    PAST MEDICAL HISTORY      Past Medical History:   Diagnosis Date   • Adenomatous colon polyp 2015    tubular   • Ascending aorta dilatation (CMS/HCC) 05/09/2018    MILD-  TBI with chronic pain and depression 3.9 CM   • BCC (basal cell carcinoma of skin)    • Gall bladder polyp 2020   • Hepatomegaly    • Hypothyroidism    • Intestinal ulceration     terminal ileum   • Iron deficiency anemia    • Low HDL (under 40)    • Malabsorption of iron 2018   • Mitral valve regurgitation 2018    MILD   • Multinodular goiter    • Obesity (BMI 30-39.9)    • DORETHA on CPAP    • PONV (postoperative nausea and vomiting)    • Primary hyperparathyroidism (CMS/HCC)    • Splenomegaly    • Thickened endometrium    • Urinary, incontinence, stress female    • Uterine fibroid    • Vitamin D insufficiency          SOCIAL  HISTORY      Social History     Socioeconomic History   • Marital status: /Civil Union     Spouse name: Not on file   • Number of children: Not on file   • Years of education: Not on file   • Highest education level: Not on file   Occupational History   • Occupation: unemployed     Employer: [employer]   Social Needs   • Financial resource strain: Not on file   • Food insecurity     Worry: Not on file     Inability: Not on file   • Transportation needs     Medical: Not on file     Non-medical: Not on file   Tobacco Use   • Smoking status: Former Smoker     Years: 13.00     Types: Cigarettes     Quit date: 1992     Years since quittin.1   • Smokeless tobacco: Never Used   Substance and Sexual Activity   • Alcohol use: Yes     Alcohol/week: 0.8 standard drinks     Types: 1 Standard drinks or equivalent per week     Comment: 2 every other week   • Drug use: No   • Sexual activity: Yes     Partners: Male     Birth control/protection: None     Comment:  unable to ejaculate   Lifestyle   • Physical activity     Days per week: Not on file     Minutes per session: Not on file   • Stress: Not on file   Relationships   • Social connections     Talks on phone: Not on file     Gets together: Not on file     Attends Mandaeism service: Not on file     Active member of club or  organization: Not on file     Attends meetings of clubs or organizations: Not on file     Relationship status: Not on file   • Intimate partner violence     Fear of current or ex partner: Not on file     Emotionally abused: Not on file     Physically abused: Not on file     Forced sexual activity: Not on file   Other Topics Concern   •  Service Not Asked   • Blood Transfusions No   • Caffeine Concern Not Asked   • Occupational Exposure No     Comment: No TB or Hepatitis exposure   • Hobby Hazards Not Asked   • Sleep Concern Not Asked   • Stress Concern Not Asked   • Weight Concern Not Asked   • Special Diet Not Asked   • Back Care Not Asked   • Exercise Not Asked   • Bike Helmet Not Asked   • Seat Belt Yes   • Self-Exams Not Asked   Social History Narrative    FAMILY HX NEGATIVE FOR:aneurysm,stroke ,blood clots,,Asthma,M.S.,Parkinson's,breast ca, colon ca,ovarian ca, uterine ca, or thyroid ca.or pancreatic cancer        Patient reports she is  with 3 children. Patient reports feels safe in home. Patient reports having smoke detector and co2 alarm in home. Patient denies having any guns in the home.          FAMILY   HISTORY      Family History   Problem Relation Age of Onset   • Thyroid Mother    • High blood pressure Mother         takes meds   • Atrial Fibrilliation Mother    • High blood pressure Maternal Grandmother    • Myocardial Infarction Maternal Grandmother 68        smoker   • Diabetes Neg Hx          MEDICATIONS / INFUSIONS     Current Outpatient Medications   Medication Sig   • budesonide (ENTOCORT EC) 3 MG 24 hr capsule BUDESONIDE 3 MG CPEP   • liothyronine (CYTOMEL) 5 MCG tablet Take 1 tablet by mouth 2 times daily.   • levothyroxine 112 MCG tablet Take 1 tablet by mouth daily (before breakfast).   • ESOMEPRAZOLE MAGNESIUM PO    • omeprazole (PriLOSEC) 40 MG capsule Take 40 mg by mouth daily.   • Ferrous Sulfate (Iron) 325 (65 Fe) MG Tab Take 1 tablet by mouth daily. Indications: Iron  Deficiency   • cholecalciferol (VITAMIN D3) 1000 UNITS tablet Take 2,000 Units by mouth daily.   • Multiple Vitamins-Minerals (MULTIVITAMIN ADULT PO)      No current facility-administered medications for this visit.            ALLERGIES   ALLERGIES:  No Known Allergies      PHYSICAL EXAM     Vitals:    02/25/21 1558   BP: 122/78   Pulse: 84   Resp: 17   Weight: 123.4 kg   Height: 5' 9\" (1.753 m)     General: NAD, pleasant, alert and oriented x 3.  Neck: No carotid bruits, no JVD (jugular venous distension). Normal carotid upstroke.  Pulmonary: No retractions or increased work of breathing, clear to auscultation bilaterally. No crackles or wheezing.  Cardiovascular: PMI (point of maxima impulse) in 5th intercostal place. RRR, normal S1 S2, no S3 or S4 appreciated. There is a 1/6 holosystolic murmur radiating from apex of axilla  Abdomen:  Bowel sounds normoactive, soft, non-tender, non-distended, no hepatosplenomegaly.  Extremities:  No edema or cyanosis,     CHOLESTEROL (mg/dL)   Date Value   05/29/2019 182     Cholesterol (mg/dL)   Date Value   09/08/2020 165     HDL (mg/dL)   Date Value   09/08/2020 34 (L)   05/29/2019 32 (L)     CHOL/HDL (no units)   Date Value   05/29/2019 5.7 (H)     Cholesterol/ HDL Ratio (no units)   Date Value   09/08/2020 4.9 (H)     TRIGLYCERIDE (mg/dL)   Date Value   05/29/2019 81     Triglycerides (mg/dL)   Date Value   09/08/2020 110     CALCULATED LDL (mg/dL)   Date Value   05/29/2019 134 (H)     LDL (mg/dL)   Date Value   09/08/2020 109     Creatinine (mg/dL)   Date Value   02/09/2021 0.70   05/29/2019 0.65     BUN (mg/dL)   Date Value   02/09/2021 14   05/29/2019 15       ASSESSMENT     1.  Anemia.  2.  Mildly dilated ascending aorta 3.9 cm echocardiogram 6/2018, 4.3 cm on echo 02/22/2021  3.  Mild to moderate mitral regurgitation stable on echo 02/22/2021  4.  Shortness of breath.     LING Avalos is a 58 year old female who has a history of anemia and mild to moderate  mitral regurgitation who is not having any complaints of chest pain or shortness of breath. Discussed the results of her stress test and echocardiogram 02/22/2021. Her stress test was negative for ischemia, LV function was stable. Her echocardiogram demonstrated normal LV function with an EF of 57%, mild to moderate MR, mild TR, and incremental increase of her ascending aortic aneurysm at 4.2 cm up from 3.9 cm in 05/2018. Blood work 02/09/2021 was stable, she has some anemia which is followed by Dr. Head. Her blood pressure is well controlled. The patient is stable from a cardiac standpoint. She is stable from a cardiac standpoint and will follow up with me in 1 year. If she has increased fatigue or more short-winded, she will contact me sooner. Thank you for allowing me to participate in the care of this patient.    On 2/25/2021, I Víctor Watson, personally transcribed this document on behalf of Dr. Clayton Schmidt MD.    The documentation recorded by the scribe accurately and completely reflects the service(s) I personally performed and the decisions made by me.         Sincerely,      Clayton Schmidt M.D.-Ph.D. [facility]

## 2021-03-15 DIAGNOSIS — Z23 NEED FOR VACCINATION: ICD-10-CM

## 2021-05-03 ENCOUNTER — OFFICE VISIT (OUTPATIENT)
Dept: NEUROLOGY | Facility: MEDICAL CENTER | Age: 66
End: 2021-05-03
Attending: PSYCHIATRY & NEUROLOGY
Payer: COMMERCIAL

## 2021-05-03 VITALS
DIASTOLIC BLOOD PRESSURE: 76 MMHG | HEIGHT: 66 IN | BODY MASS INDEX: 32.95 KG/M2 | SYSTOLIC BLOOD PRESSURE: 120 MMHG | OXYGEN SATURATION: 97 % | HEART RATE: 89 BPM | TEMPERATURE: 98.6 F | WEIGHT: 205.03 LBS

## 2021-05-03 DIAGNOSIS — G43.719 INTRACTABLE CHRONIC MIGRAINE WITHOUT AURA AND WITHOUT STATUS MIGRAINOSUS: ICD-10-CM

## 2021-05-03 DIAGNOSIS — G20.A1 PARKINSON'S DISEASE (HCC): ICD-10-CM

## 2021-05-03 DIAGNOSIS — F02.80 DEMENTIA ASSOCIATED WITH OTHER UNDERLYING DISEASE WITHOUT BEHAVIORAL DISTURBANCE (HCC): ICD-10-CM

## 2021-05-03 DIAGNOSIS — G40.219 COMPLEX PARTIAL EPILEPSY, INTRACTABLE (HCC): ICD-10-CM

## 2021-05-03 PROCEDURE — 99212 OFFICE O/P EST SF 10 MIN: CPT | Performed by: PSYCHIATRY & NEUROLOGY

## 2021-05-03 PROCEDURE — 99215 OFFICE O/P EST HI 40 MIN: CPT | Performed by: PSYCHIATRY & NEUROLOGY

## 2021-05-03 RX ORDER — TOPIRAMATE 50 MG/1
TABLET, FILM COATED ORAL
Qty: 180 TABLET | Refills: 3 | Status: SHIPPED | OUTPATIENT
Start: 2021-05-03 | End: 2021-08-10

## 2021-05-03 RX ORDER — MEMANTINE HYDROCHLORIDE 28 MG/1
28 CAPSULE, EXTENDED RELEASE ORAL
Qty: 90 CAPSULE | Refills: 3 | Status: SHIPPED | OUTPATIENT
Start: 2021-05-03 | End: 2022-04-27

## 2021-05-03 ASSESSMENT — PATIENT HEALTH QUESTIONNAIRE - PHQ9
5. POOR APPETITE OR OVEREATING: 0 - NOT AT ALL
SUM OF ALL RESPONSES TO PHQ QUESTIONS 1-9: 6
CLINICAL INTERPRETATION OF PHQ2 SCORE: 1

## 2021-05-03 ASSESSMENT — FIBROSIS 4 INDEX: FIB4 SCORE: 1.77

## 2021-05-04 NOTE — ASSESSMENT & PLAN NOTE
Dr. Ruben Bertrand was concerned that the tremor was parkinsonian in nature and tried carbidopa levodopa but it made him have nauseous and dizzy side effects and did not help the tremor.

## 2021-05-04 NOTE — ASSESSMENT & PLAN NOTE
Rico has had seizures both epileptic and nonepileptic since the traumatic brain injury in 2006.  Recently he has had most problems with seizures coming out of anesthesia and he needs to have a EGD and colonoscopy but he is postpone that to the fall when he comes back from his summer stay in [facility].  Patient has been doing pretty well with his seizures taking Depakote and Topamax.  However patient does have some issues with tremor which propranolol does not sufficiently help.

## 2021-05-04 NOTE — ASSESSMENT & PLAN NOTE
Patient states that the Emgality in combination with the Botox he gets from pain management does help minimize his pain.  He is not completely headache free especially in the winter months.  He has questions about ubrevly.  The patient states he sees [facility] for treatment including the Botox.

## 2021-05-04 NOTE — ASSESSMENT & PLAN NOTE
Patient does receive some benefit from memantine and Cymbalta for his memory and behavioral issues associated with the head trauma.

## 2021-05-04 NOTE — PROGRESS NOTES
Chief Complaint   Patient presents with   • Follow-Up     dementia and migraines       Problem List Items Addressed This Visit     Complex partial epilepsy, intractable (HCC)     Rico has had seizures both epileptic and nonepileptic since the traumatic brain injury in 2006.  Recently he has had most problems with seizures coming out of anesthesia and he needs to have a EGD and colonoscopy but he is postpone that to the fall when he comes back from his summer stay in [facility].  Patient has been doing pretty well with his seizures taking Depakote and Topamax.  However patient does have some issues with tremor which propranolol does not sufficiently help.         Relevant Medications    topiramate (TOPAMAX) 50 MG tablet    Headache, chronic migraine without aura, intractable     Patient states that the Emgality in combination with the Botox he gets from pain management does help minimize his pain.  He is not completely headache free especially in the winter months.  He has questions about ubrevly.  The patient states he sees [facility] pain for treatment including the Botox.         Relevant Medications    topiramate (TOPAMAX) 50 MG tablet    Dementia associated with other underlying disease without behavioral disturbance ([facility])     Patient does receive some benefit from memantine and Cymbalta for his memory and behavioral issues associated with the head trauma.         Relevant Medications    topiramate (TOPAMAX) 50 MG tablet    Memantine HCl ER (NAMENDA) 28 MG CAPSULE SR 24 HR    Other Relevant Orders    NM-BRAIN IMAGING SPECT SINGLE DAY    Parkinson's disease ([facility])     Dr. Ruben Bertrand was concerned that the tremor was parkinsonian in nature and tried carbidopa levodopa but it made him have nauseous and dizzy side effects and did not help the tremor.         Relevant Medications    topiramate (TOPAMAX) 50 MG tablet    Other Relevant Orders    NM-BRAIN IMAGING SPECT SINGLE DAY          History of present  illness:  Eriberto Young 65 y.o. male presents today for Hologic treatment of multiple medical and neurologic issues related to traumatic brain injury in 2006.  Patient's tremor and gait have gotten slightly worse since I saw him last.    Past medical history:   Past Medical History:   Diagnosis Date   • Anxiety    • Chronic pain    • Complex partial epilepsy, intractable (HCC)    • Depression    • Head injuries    • Hydrocephalus (HCC)     w/ shunt   • Post concussion syndrome 11/10/2014   • Self-injurious behavior    • Suicide attempt (HCC)        Past surgical history:   Past Surgical History:   Procedure Laterality Date   • ABDOMINAL EXPLORATION     • ACHILLES TENDON REPAIR     • CRANIOTOMY     • HERNIA REP HIATAL      times 2   • HERNIA REPAIR     • LAMINOTOMY         Family history:   Family History   Problem Relation Age of Onset   • Alcohol abuse Father    • ADD / ADHD Maternal Grandfather    • ADD / ADHD Maternal Grandmother    • Dementia Maternal Grandmother        Social history:   Social History     Socioeconomic History   • Marital status:      Spouse name: Not on file   • Number of children: Not on file   • Years of education: Not on file   • Highest education level: Not on file   Occupational History   • Not on file   Tobacco Use   • Smoking status: Never Smoker   • Smokeless tobacco: Never Used   Substance and Sexual Activity   • Alcohol use: Yes     Alcohol/week: 1.2 oz     Types: 2 Glasses of wine per week   • Drug use: No   • Sexual activity: Not on file   Other Topics Concern   • Not on file   Social History Narrative   • Not on file     Social Determinants of Health     Financial Resource Strain:    • Difficulty of Paying Living Expenses:    Food Insecurity:    • Worried About Running Out of Food in the Last Year:    • Ran Out of Food in the Last Year:    Transportation Needs:    • Lack of Transportation (Medical):    • Lack of Transportation (Non-Medical):    Physical Activity:    • Days  of Exercise per Week:    • Minutes of Exercise per Session:    Stress:    • Feeling of Stress :    Social Connections:    • Frequency of Communication with Friends and Family:    • Frequency of Social Gatherings with Friends and Family:    • Attends [employer] Services:    • Active Member of Clubs or Organizations:    • Attends Club or Organization Meetings:    • Marital Status:    Intimate Partner Violence:    • Fear of Current or Ex-Partner:    • Emotionally Abused:    • Physically Abused:    • Sexually Abused:        Current medications:   Current Outpatient Medications   Medication   • topiramate (TOPAMAX) 50 MG tablet   • Memantine HCl ER (NAMENDA) 28 MG CAPSULE SR 24 HR   • ketorolac (TORADOL) 10 MG Tab   • divalproex ER (DEPAKOTE ER) 500 MG TABLET SR 24 HR   • EMGALITY 120 MG/ML Solution Auto-injector   • DULoxetine (CYMBALTA) 30 MG Cap DR Particles   • Mirabegron ER 50 MG TABLET SR 24 HR   • oxyCODONE-acetaminophen (PERCOCET) 5-325 MG Tab   • Ibuprofen 200 MG Cap   • asa/apap/caffeine (EXCEDRIN) 250-250-65 MG Tab   • BOTOX 200 UNITS Recon Soln   • pantoprazole (PROTONIX) 40 MG Tablet Delayed Response   • acetaminophen/caffeine/butalbital 325-40-50 mg (FIORICET) -40 MG Tab   • hydrocodone-acetaminophen (NORCO) 5-325 MG TABS per tablet   • omega-3 acid ethyl esters (LOVAZA) 1 GM capsule   • trazodone (DESYREL) 50 MG TABS   • M-VIT PO     No current facility-administered medications for this visit.       Medication Allergy:  Allergies   Allergen Reactions   • Ativan      High doses   • Hydromorphone Hcl    • Nkda [No Known Drug Allergy]        Review of systems:   Constitutional: denies fever, night sweats, weight loss.   Eyes: denies acute vision change, eye pain or secretion.   Ears, Nose, Mouth, Throat: denies nasal secretion, nasal bleeding, difficulty swallowing, hearing loss, tinnitus, vertigo, ear pain, acute dental problems, oral ulcers or lesions.   Endocrine: denies recent weight changes, heat or  "cold intolerance, polyuria, polydypsia, polyphagia,abnormal hair growth.  Cardiovascular: denies new onset of chest pain, palpitations, syncope, or dyspnea of exertion.  Pulmonary: denies shortness of breath, new onset of cough, hemoptysis, wheezing, chest pain or flu-like symptoms.   GI: denies nausea, vomiting, diarrhea, GI bleeding, change in appetite, abdominal pain, and change in bowel habits.  : denies dysuria, urinary incontinence, hematuria.  Heme/oncology: denies history of easy bruising or bleeding. No history of cancer, DVTor PE.  Allergy/immunology: denies hives/urticaria, or itching.   Dermatologic: denies new rash, or new skin lesions.  Musculoskeletal:denies joint swelling or pain, muscle pain, neck and back pain. Neurologic: denies headaches, acute visual changes, facial droopiness, muscle weakness (focal or generalized), paresthesias, anesthesia, ataxia, change in speech or language, memory loss, abnormal movements, seizures, loss of consciousness, or episodes of confusion.   Psychiatric: denies symptoms of depression, anxiety, hallucinations, mood swings or changes, suicidal or homicidal thoughts.     Physical examination:   Vitals:    05/03/21 0952   BP: 120/76   BP Location: Left arm   Patient Position: Sitting   Pulse: 89   Temp: 37 °C (98.6 °F)   TempSrc: Temporal   SpO2: 97%   Weight: 93 kg (205 lb 0.4 oz)   Height: 1.676 m (5' 6\")     General: Patient in no acute distress, pleasant and cooperative.  HEENT: Normocephalic, no signs of acute trauma.   Neck: supple, no meningeal signs or carotid bruits. There is normal range of motion. No tenderness on exam.   Chest: clear to auscultation. No cough.   CV: RRR, no murmurs.   Skin: no signs of acute rashes or trauma.   Musculoskeletal: joints exhibit full range of motion, without any pain to palpation. There are no signs of joint or muscle swelling. There is no tenderness to deep palpation of muscles.   Psychiatric: No hallucinatory behavior. " Denies symptoms of depression or suicidal ideation. Mood and affect appear normal on exam.     NEUROLOGICAL EXAM:   Mental status, orientation: Awake, alert and fully oriented.   Speech and language: speech slow and stuttering. The patient is able to name, repeat and comprehend.   Memory: There is intact recollection of recent and remote events.   Cranial nerve exam: Pupils are 3-4 mm bilaterally and equally reactive to light and accommodation. Visual fields are intact by confrontation. Fundoscopic exam was unremarkable. There is no nystagmus on primary or secondary gaze. Intact full EOM in all directions of gaze. Face appears symmetric. Sensation in the face is intact to light touch. Uvula is midline. Palate elevates symmetrically. Tongue is midline and without any signs of tongue biting or fasciculations. Sternocleidomastoid muscles exhibit is normal strength bilaterally. Shoulder shrug is intact bilaterally.   Motor exam: Strength is 4+/5 in all extremities. Tone is abnormal.  Resting in and action tremor are present and slightly worse in his right over his left arm.  Sensory exam reveals normal sense of light touch, proprioception, vibration and pinprick in all extremities.   Deep tendon reflexes:  2+ throughout. Plantar responses are flexor. There is no clonus.   Coordination: shows a normal finger-nose-finger. Normal rapidly alternating movements.   Gait: The patient was able to get up from seated position on first attempt without requiring assistance and walks with a broad base. Movements accompanied by tremor. The patient was able to turn slowly. Romberg examination positive      ANCILLARY DATA REVIEWED:     Lab Data Review:  No results found for this or any previous visit (from the past 24 hour(s)).    Records reviewed: Reviewed records from [facility] and Dr. Henriquez      Imaging: Viewed imaging of the brain from [facility].          ASSESSMENT AND PLAN:    1. Intractable chronic migraine without aura and  without status migrainosus  Plan to refill topiramate and to continue with pain management for Botox and Emgality and to consider Ubrelvy as an abortive agent.    - topiramate (TOPAMAX) 50 MG tablet; TAKE 1 TABLET BY MOUTH TWICE A DAY  Dispense: 180 tablet; Refill: 3    2. Complex partial epilepsy, intractable (HCC)  She will also continue with Depakote 500 mg BID  - topiramate (TOPAMAX) 50 MG tablet; TAKE 1 TABLET BY MOUTH TWICE A DAY  Dispense: 180 tablet; Refill: 3    3. Dementia associated with other underlying disease without behavioral disturbance (HCC)  I refilled patient's memantine and he has benefited from speech therapy in the past we will see if we see any evidence of parkinsonism as a added cause.  - Memantine HCl ER (NAMENDA) 28 MG CAPSULE SR 24 HR; Take 1 capsule by mouth every day.  Dispense: 90 capsule; Refill: 3  - NM-BRAIN IMAGING SPECT SINGLE DAY; Future    4. Parkinson's disease ([facility])  Evaluate for Parkinson's disease as a cause of his worsening tremor  - NM-BRAIN IMAGING SPECT SINGLE DAY; Future    My total time spent caring for the patient on the day of the encounter was 45 minutes.   This does not include time spent on separately billable procedures/tests.    FOLLOW-UP:   3 months or after DaTscan          EDUCATION AND COUNSELING:  -Discussed regular exercise program and prevention of cardiovascular disease, including stroke.   -Discussed healthy lifestyle, including: healthy diet (rich in fruits, vegetables, nuts and healthy oils); proper hydration, and adequate sleep hygiene (allowing 7-8 hrs of overnight sleep).        Melissa Bloch, MD  Clinical  of Neurology [facility].   Diplomate in Neurology.   Office: [phone]  Fax: [phone]

## 2021-07-02 ENCOUNTER — APPOINTMENT (OUTPATIENT)
Dept: RADIOLOGY | Facility: MEDICAL CENTER | Age: 66
DRG: 880 | End: 2021-07-02
Payer: COMMERCIAL

## 2021-07-02 ENCOUNTER — HOSPITAL ENCOUNTER (INPATIENT)
Facility: MEDICAL CENTER | Age: 66
LOS: 10 days | DRG: 880 | End: 2021-07-12
Attending: EMERGENCY MEDICINE | Admitting: INTERNAL MEDICINE
Payer: COMMERCIAL

## 2021-07-02 ENCOUNTER — APPOINTMENT (OUTPATIENT)
Dept: RADIOLOGY | Facility: MEDICAL CENTER | Age: 66
DRG: 880 | End: 2021-07-02
Attending: SURGERY
Payer: COMMERCIAL

## 2021-07-02 ENCOUNTER — APPOINTMENT (OUTPATIENT)
Dept: RADIOLOGY | Facility: MEDICAL CENTER | Age: 66
DRG: 880 | End: 2021-07-02
Attending: EMERGENCY MEDICINE
Payer: COMMERCIAL

## 2021-07-02 ENCOUNTER — APPOINTMENT (OUTPATIENT)
Dept: RADIOLOGY | Facility: MEDICAL CENTER | Age: 66
DRG: 880 | End: 2021-07-02
Attending: INTERNAL MEDICINE
Payer: COMMERCIAL

## 2021-07-02 DIAGNOSIS — F44.5 PSYCHOGENIC NONEPILEPTIC SEIZURE: ICD-10-CM
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
## 2021-07-02 DIAGNOSIS — G20.A1 PARKINSON DISEASE (HCC): ICD-10-CM

## 2021-07-02 DIAGNOSIS — S80.211A ABRASION, RIGHT KNEE, INITIAL ENCOUNTER: ICD-10-CM

## 2021-07-02 DIAGNOSIS — R56.9 SEIZURE (HCC): ICD-10-CM

## 2021-07-02 DIAGNOSIS — Z98.2 S/P VP SHUNT: ICD-10-CM

## 2021-07-02 DIAGNOSIS — T14.90XA TRAUMA: ICD-10-CM

## 2021-07-02 DIAGNOSIS — S09.90XA CLOSED HEAD INJURY, INITIAL ENCOUNTER: ICD-10-CM

## 2021-07-02 DIAGNOSIS — F03.90 DEMENTIA WITHOUT BEHAVIORAL DISTURBANCE, UNSPECIFIED DEMENTIA TYPE: ICD-10-CM

## 2021-07-02 PROBLEM — J96.90 RESPIRATORY FAILURE FOLLOWING TRAUMA (HCC): Status: ACTIVE | Noted: 2021-07-02

## 2021-07-02 PROBLEM — G93.40 ACUTE ENCEPHALOPATHY: Status: ACTIVE | Noted: 2021-07-02

## 2021-07-02 PROBLEM — G91.9 HYDROCEPHALUS (HCC): Status: ACTIVE | Noted: 2021-07-02

## 2021-07-02 PROBLEM — E66.9 OBESITY (BMI 30.0-34.9): Status: ACTIVE | Noted: 2021-07-02

## 2021-07-02 PROBLEM — E66.811 OBESITY (BMI 30.0-34.9): Status: ACTIVE | Noted: 2021-07-02

## 2021-07-02 LAB
ABO GROUP BLD: NORMAL
ALBUMIN SERPL BCP-MCNC: 4.3 G/DL (ref 3.2–4.9)
ALBUMIN/GLOB SERPL: 1.6 G/DL
ALP SERPL-CCNC: 53 U/L (ref 30–99)
ALT SERPL-CCNC: 19 U/L (ref 2–50)
ANION GAP SERPL CALC-SCNC: 10 MMOL/L (ref 7–16)
APTT PPP: 28.5 SEC (ref 24.7–36)
AST SERPL-CCNC: 20 U/L (ref 12–45)
BASE EXCESS BLDA CALC-SCNC: -2 MMOL/L (ref -4–3)
BILIRUB SERPL-MCNC: 0.4 MG/DL (ref 0.1–1.5)
BLD GP AB SCN SERPL QL: NORMAL
BODY TEMPERATURE: ABNORMAL DEGREES
BREATHS SETTING VENT: 20
BUN SERPL-MCNC: 8 MG/DL (ref 8–22)
CALCIUM SERPL-MCNC: 8.9 MG/DL (ref 8.5–10.5)
CFT BLD TEG: 5 MIN (ref 5–10)
CHLORIDE SERPL-SCNC: 104 MMOL/L (ref 96–112)
CLOT ANGLE BLD TEG: 61.8 DEGREES (ref 53–72)
CLOT LYSIS 30M P MA LENFR BLD TEG: 0 % (ref 0–8)
CO2 BLDA-SCNC: 24 MMOL/L (ref 20–33)
CO2 SERPL-SCNC: 24 MMOL/L (ref 20–33)
CREAT SERPL-MCNC: 0.72 MG/DL (ref 0.5–1.4)
CT.EXTRINSIC BLD ROTEM: 2.2 MIN (ref 1–3)
DELSYS IDSYS: ABNORMAL
END TIDAL CARBON DIOXIDE IECO2: 33 MMHG
ERYTHROCYTE [DISTWIDTH] IN BLOOD BY AUTOMATED COUNT: 42.2 FL (ref 35.9–50)
ETHANOL BLD-MCNC: <10.1 MG/DL (ref 0–10)
GLOBULIN SER CALC-MCNC: 2.7 G/DL (ref 1.9–3.5)
GLUCOSE SERPL-MCNC: 150 MG/DL (ref 65–99)
HCO3 BLDA-SCNC: 23.2 MMOL/L (ref 17–25)
HCT VFR BLD AUTO: 45 % (ref 42–52)
HGB BLD-MCNC: 15.6 G/DL (ref 14–18)
HOROWITZ INDEX BLDA+IHG-RTO: 200 MM[HG]
INR PPP: 0.97 (ref 0.87–1.13)
LACTATE BLD-SCNC: 2.1 MMOL/L (ref 0.5–2)
MCF BLD TEG: 58.4 MM (ref 50–70)
MCH RBC QN AUTO: 32 PG (ref 27–33)
MCHC RBC AUTO-ENTMCNC: 34.7 G/DL (ref 33.7–35.3)
MCV RBC AUTO: 92.4 FL (ref 81.4–97.8)
MODE IMODE: ABNORMAL
O2/TOTAL GAS SETTING VFR VENT: 75 %
PA AA BLD-ACNC: 0 %
PA ADP BLD-ACNC: 11.9 %
PCO2 BLDA: 41.1 MMHG (ref 26–37)
PEEP END EXPIRATORY PRESSURE IPEEP: 8 CMH20
PH BLDA: 7.36 [PH] (ref 7.4–7.5)
PLATELET # BLD AUTO: 187 K/UL (ref 164–446)
PMV BLD AUTO: 9.8 FL (ref 9–12.9)
PO2 BLDA: 150 MMHG (ref 64–87)
POTASSIUM SERPL-SCNC: 4.5 MMOL/L (ref 3.6–5.5)
PROT SERPL-MCNC: 7 G/DL (ref 6–8.2)
PROTHROMBIN TIME: 12.6 SEC (ref 12–14.6)
RBC # BLD AUTO: 4.87 M/UL (ref 4.7–6.1)
RH BLD: NORMAL
SAO2 % BLDA: 99 % (ref 93–99)
SODIUM SERPL-SCNC: 138 MMOL/L (ref 135–145)
SPECIMEN DRAWN FROM PATIENT: ABNORMAL
TEG ALGORITHM TGALG: NORMAL
TIDAL VOLUME IVT: 400 ML
TRIGL SERPL-MCNC: 203 MG/DL (ref 0–149)
WBC # BLD AUTO: 7.6 K/UL (ref 4.8–10.8)

## 2021-07-02 PROCEDURE — 700111 HCHG RX REV CODE 636 W/ 250 OVERRIDE (IP)

## 2021-07-02 PROCEDURE — 86900 BLOOD TYPING SEROLOGIC ABO: CPT

## 2021-07-02 PROCEDURE — 72125 CT NECK SPINE W/O DYE: CPT

## 2021-07-02 PROCEDURE — 700102 HCHG RX REV CODE 250 W/ 637 OVERRIDE(OP): Performed by: INTERNAL MEDICINE

## 2021-07-02 PROCEDURE — 96366 THER/PROPH/DIAG IV INF ADDON: CPT

## 2021-07-02 PROCEDURE — 82803 BLOOD GASES ANY COMBINATION: CPT

## 2021-07-02 PROCEDURE — 85027 COMPLETE CBC AUTOMATED: CPT

## 2021-07-02 PROCEDURE — 85347 COAGULATION TIME ACTIVATED: CPT

## 2021-07-02 PROCEDURE — A9270 NON-COVERED ITEM OR SERVICE: HCPCS | Performed by: INTERNAL MEDICINE

## 2021-07-02 PROCEDURE — 770022 HCHG ROOM/CARE - ICU (200)

## 2021-07-02 PROCEDURE — 700117 HCHG RX CONTRAST REV CODE 255: Performed by: EMERGENCY MEDICINE

## 2021-07-02 PROCEDURE — 85384 FIBRINOGEN ACTIVITY: CPT

## 2021-07-02 PROCEDURE — 86901 BLOOD TYPING SEROLOGIC RH(D): CPT

## 2021-07-02 PROCEDURE — 94799 UNLISTED PULMONARY SVC/PX: CPT

## 2021-07-02 PROCEDURE — 85730 THROMBOPLASTIN TIME PARTIAL: CPT

## 2021-07-02 PROCEDURE — 71045 X-RAY EXAM CHEST 1 VIEW: CPT

## 2021-07-02 PROCEDURE — 82077 ASSAY SPEC XCP UR&BREATH IA: CPT

## 2021-07-02 PROCEDURE — 99291 CRITICAL CARE FIRST HOUR: CPT | Performed by: INTERNAL MEDICINE

## 2021-07-02 PROCEDURE — 99223 1ST HOSP IP/OBS HIGH 75: CPT | Performed by: HOSPITALIST

## 2021-07-02 PROCEDURE — 303105 HCHG CATHETER EXTRA

## 2021-07-02 PROCEDURE — G0390 TRAUMA RESPONS W/HOSP CRITI: HCPCS

## 2021-07-02 PROCEDURE — 80053 COMPREHEN METABOLIC PANEL: CPT

## 2021-07-02 PROCEDURE — 700105 HCHG RX REV CODE 258: Performed by: INTERNAL MEDICINE

## 2021-07-02 PROCEDURE — 96365 THER/PROPH/DIAG IV INF INIT: CPT

## 2021-07-02 PROCEDURE — 700111 HCHG RX REV CODE 636 W/ 250 OVERRIDE (IP): Performed by: HOSPITALIST

## 2021-07-02 PROCEDURE — 70450 CT HEAD/BRAIN W/O DYE: CPT

## 2021-07-02 PROCEDURE — 94002 VENT MGMT INPAT INIT DAY: CPT

## 2021-07-02 PROCEDURE — 36600 WITHDRAWAL OF ARTERIAL BLOOD: CPT

## 2021-07-02 PROCEDURE — 83605 ASSAY OF LACTIC ACID: CPT

## 2021-07-02 PROCEDURE — 84478 ASSAY OF TRIGLYCERIDES: CPT

## 2021-07-02 PROCEDURE — 86850 RBC ANTIBODY SCREEN: CPT

## 2021-07-02 PROCEDURE — 70486 CT MAXILLOFACIAL W/O DYE: CPT

## 2021-07-02 PROCEDURE — 99291 CRITICAL CARE FIRST HOUR: CPT

## 2021-07-02 PROCEDURE — 700111 HCHG RX REV CODE 636 W/ 250 OVERRIDE (IP): Performed by: INTERNAL MEDICINE

## 2021-07-02 PROCEDURE — 72131 CT LUMBAR SPINE W/O DYE: CPT | Mod: MH

## 2021-07-02 PROCEDURE — 5A1945Z RESPIRATORY VENTILATION, 24-96 CONSECUTIVE HOURS: ICD-10-PCS | Performed by: INTERNAL MEDICINE

## 2021-07-02 PROCEDURE — 85610 PROTHROMBIN TIME: CPT

## 2021-07-02 PROCEDURE — 51702 INSERT TEMP BLADDER CATH: CPT

## 2021-07-02 PROCEDURE — 72128 CT CHEST SPINE W/O DYE: CPT | Mod: MH

## 2021-07-02 PROCEDURE — 71260 CT THORAX DX C+: CPT

## 2021-07-02 PROCEDURE — 85576 BLOOD PLATELET AGGREGATION: CPT

## 2021-07-02 RX ORDER — PANTOPRAZOLE SODIUM 40 MG/1
40 TABLET, DELAYED RELEASE ORAL DAILY
COMMUNITY
End: 2021-08-10

## 2021-07-02 RX ORDER — CLONIDINE HYDROCHLORIDE 0.1 MG/1
0.1 TABLET ORAL EVERY 6 HOURS PRN
Status: DISCONTINUED | OUTPATIENT
Start: 2021-07-02 | End: 2021-07-02

## 2021-07-02 RX ORDER — BISACODYL 10 MG
10 SUPPOSITORY, RECTAL RECTAL
Status: DISCONTINUED | OUTPATIENT
Start: 2021-07-02 | End: 2021-07-04

## 2021-07-02 RX ORDER — POLYETHYLENE GLYCOL 3350 17 G/17G
1 POWDER, FOR SOLUTION ORAL
Status: DISCONTINUED | OUTPATIENT
Start: 2021-07-02 | End: 2021-07-02

## 2021-07-02 RX ORDER — PROMETHAZINE HYDROCHLORIDE 12.5 MG/1
12.5-25 SUPPOSITORY RECTAL EVERY 4 HOURS PRN
Status: DISCONTINUED | OUTPATIENT
Start: 2021-07-02 | End: 2021-07-04

## 2021-07-02 RX ORDER — POLYETHYLENE GLYCOL 3350 17 G/17G
1 POWDER, FOR SOLUTION ORAL
Status: DISCONTINUED | OUTPATIENT
Start: 2021-07-02 | End: 2021-07-04

## 2021-07-02 RX ORDER — MEMANTINE HYDROCHLORIDE 28 MG/1
28 CAPSULE, EXTENDED RELEASE ORAL DAILY
COMMUNITY
End: 2021-08-10

## 2021-07-02 RX ORDER — ACETAMINOPHEN 325 MG/1
650 TABLET ORAL EVERY 6 HOURS PRN
Status: DISCONTINUED | OUTPATIENT
Start: 2021-07-02 | End: 2021-07-04

## 2021-07-02 RX ORDER — DIVALPROEX SODIUM 125 MG/1
500 CAPSULE, COATED PELLETS ORAL EVERY 12 HOURS
Status: DISCONTINUED | OUTPATIENT
Start: 2021-07-02 | End: 2021-07-03

## 2021-07-02 RX ORDER — CLONIDINE HYDROCHLORIDE 0.1 MG/1
0.1 TABLET ORAL EVERY 6 HOURS PRN
Status: DISCONTINUED | OUTPATIENT
Start: 2021-07-02 | End: 2021-07-03

## 2021-07-02 RX ORDER — TOPIRAMATE 25 MG/1
50 TABLET ORAL 2 TIMES DAILY
Status: DISCONTINUED | OUTPATIENT
Start: 2021-07-02 | End: 2021-07-02

## 2021-07-02 RX ORDER — LORAZEPAM 2 MG/ML
2-4 INJECTION INTRAMUSCULAR
Status: DISCONTINUED | OUTPATIENT
Start: 2021-07-02 | End: 2021-07-12 | Stop reason: HOSPADM

## 2021-07-02 RX ORDER — SODIUM CHLORIDE 9 MG/ML
INJECTION, SOLUTION INTRAVENOUS CONTINUOUS
Status: DISCONTINUED | OUTPATIENT
Start: 2021-07-02 | End: 2021-07-04

## 2021-07-02 RX ORDER — GALCANEZUMAB 120 MG/ML
1 INJECTION, SOLUTION SUBCUTANEOUS
COMMUNITY
End: 2021-08-10

## 2021-07-02 RX ORDER — ONDANSETRON 4 MG/1
4 TABLET, ORALLY DISINTEGRATING ORAL EVERY 4 HOURS PRN
Status: DISCONTINUED | OUTPATIENT
Start: 2021-07-02 | End: 2021-07-04

## 2021-07-02 RX ORDER — OMEGA-3-ACID ETHYL ESTERS 1 G/1
1 CAPSULE, LIQUID FILLED ORAL 2 TIMES DAILY
COMMUNITY

## 2021-07-02 RX ORDER — LABETALOL HYDROCHLORIDE 5 MG/ML
10 INJECTION, SOLUTION INTRAVENOUS EVERY 4 HOURS PRN
Status: DISCONTINUED | OUTPATIENT
Start: 2021-07-02 | End: 2021-07-12 | Stop reason: HOSPADM

## 2021-07-02 RX ORDER — BUTALBITAL, ACETAMINOPHEN AND CAFFEINE 50; 325; 40 MG/1; MG/1; MG/1
1 TABLET ORAL EVERY 4 HOURS PRN
COMMUNITY
End: 2021-08-10

## 2021-07-02 RX ORDER — ONDANSETRON 4 MG/1
4 TABLET, ORALLY DISINTEGRATING ORAL EVERY 4 HOURS PRN
Status: DISCONTINUED | OUTPATIENT
Start: 2021-07-02 | End: 2021-07-02

## 2021-07-02 RX ORDER — PROMETHAZINE HYDROCHLORIDE 25 MG/1
12.5-25 TABLET ORAL EVERY 4 HOURS PRN
Status: DISCONTINUED | OUTPATIENT
Start: 2021-07-02 | End: 2021-07-02

## 2021-07-02 RX ORDER — LEVETIRACETAM 10 MG/ML
1000 INJECTION INTRAVASCULAR EVERY 12 HOURS
Status: DISCONTINUED | OUTPATIENT
Start: 2021-07-02 | End: 2021-07-05

## 2021-07-02 RX ORDER — PROCHLORPERAZINE EDISYLATE 5 MG/ML
5-10 INJECTION INTRAMUSCULAR; INTRAVENOUS EVERY 4 HOURS PRN
Status: DISCONTINUED | OUTPATIENT
Start: 2021-07-02 | End: 2021-07-04

## 2021-07-02 RX ORDER — TRAZODONE HYDROCHLORIDE 50 MG/1
50 TABLET ORAL
COMMUNITY

## 2021-07-02 RX ORDER — ONDANSETRON 2 MG/ML
4 INJECTION INTRAMUSCULAR; INTRAVENOUS EVERY 4 HOURS PRN
Status: DISCONTINUED | OUTPATIENT
Start: 2021-07-02 | End: 2021-07-12 | Stop reason: HOSPADM

## 2021-07-02 RX ORDER — TOPIRAMATE 25 MG/1
50 TABLET ORAL 2 TIMES DAILY
Status: DISCONTINUED | OUTPATIENT
Start: 2021-07-02 | End: 2021-07-04

## 2021-07-02 RX ORDER — FAMOTIDINE 20 MG/1
20 TABLET, FILM COATED ORAL EVERY 12 HOURS
Status: DISCONTINUED | OUTPATIENT
Start: 2021-07-02 | End: 2021-07-03

## 2021-07-02 RX ORDER — AMOXICILLIN 250 MG
2 CAPSULE ORAL 2 TIMES DAILY
Status: DISCONTINUED | OUTPATIENT
Start: 2021-07-02 | End: 2021-07-02

## 2021-07-02 RX ORDER — DIVALPROEX SODIUM 500 MG/1
500 TABLET, EXTENDED RELEASE ORAL 2 TIMES DAILY
COMMUNITY
End: 2021-11-01

## 2021-07-02 RX ORDER — ENALAPRILAT 1.25 MG/ML
1.25 INJECTION INTRAVENOUS EVERY 6 HOURS PRN
Status: DISCONTINUED | OUTPATIENT
Start: 2021-07-02 | End: 2021-07-12 | Stop reason: HOSPADM

## 2021-07-02 RX ORDER — PROPRANOLOL HYDROCHLORIDE 20 MG/1
20 TABLET ORAL EVERY EVENING
COMMUNITY
End: 2021-10-01 | Stop reason: SDUPTHER

## 2021-07-02 RX ORDER — TOPIRAMATE 50 MG/1
50 TABLET, FILM COATED ORAL 2 TIMES DAILY
Status: ON HOLD | COMMUNITY
End: 2021-07-12 | Stop reason: SDUPTHER

## 2021-07-02 RX ORDER — PROMETHAZINE HYDROCHLORIDE 25 MG/1
12.5-25 TABLET ORAL EVERY 4 HOURS PRN
Status: DISCONTINUED | OUTPATIENT
Start: 2021-07-02 | End: 2021-07-04

## 2021-07-02 RX ORDER — DIVALPROEX SODIUM 125 MG/1
500 CAPSULE, COATED PELLETS ORAL EVERY 12 HOURS
Status: DISCONTINUED | OUTPATIENT
Start: 2021-07-02 | End: 2021-07-02

## 2021-07-02 RX ORDER — AMOXICILLIN 250 MG
2 CAPSULE ORAL 2 TIMES DAILY
Status: DISCONTINUED | OUTPATIENT
Start: 2021-07-02 | End: 2021-07-04

## 2021-07-02 RX ORDER — ACETAMINOPHEN 325 MG/1
650 TABLET ORAL EVERY 6 HOURS PRN
Status: DISCONTINUED | OUTPATIENT
Start: 2021-07-02 | End: 2021-07-02

## 2021-07-02 RX ORDER — DULOXETIN HYDROCHLORIDE 60 MG/1
60 CAPSULE, DELAYED RELEASE ORAL EVERY EVENING
COMMUNITY

## 2021-07-02 RX ORDER — BISACODYL 10 MG
10 SUPPOSITORY, RECTAL RECTAL
Status: DISCONTINUED | OUTPATIENT
Start: 2021-07-02 | End: 2021-07-02

## 2021-07-02 RX ORDER — M-VIT,TX,IRON,MINS/CALC/FOLIC 27MG-0.4MG
1 TABLET ORAL DAILY
COMMUNITY

## 2021-07-02 RX ADMIN — DIVALPROEX SODIUM 500 MG: 125 CAPSULE ORAL at 17:32

## 2021-07-02 RX ADMIN — PROPOFOL 30 MCG/KG/MIN: 10 INJECTION, EMULSION INTRAVENOUS at 22:16

## 2021-07-02 RX ADMIN — DOCUSATE SODIUM 50 MG AND SENNOSIDES 8.6 MG 2 TABLET: 8.6; 5 TABLET, FILM COATED ORAL at 17:32

## 2021-07-02 RX ADMIN — PROPOFOL 40 MCG/KG/MIN: 10 INJECTION, EMULSION INTRAVENOUS at 17:31

## 2021-07-02 RX ADMIN — SODIUM CHLORIDE 3150 MG: 9 INJECTION, SOLUTION INTRAVENOUS at 14:06

## 2021-07-02 RX ADMIN — TOPIRAMATE 50 MG: 25 TABLET, FILM COATED ORAL at 17:32

## 2021-07-02 RX ADMIN — FENTANYL CITRATE 200 MCG: 50 INJECTION, SOLUTION INTRAMUSCULAR; INTRAVENOUS at 17:29

## 2021-07-02 RX ADMIN — IOHEXOL 100 ML: 350 INJECTION, SOLUTION INTRAVENOUS at 11:33

## 2021-07-02 RX ADMIN — SODIUM CHLORIDE: 9 INJECTION, SOLUTION INTRAVENOUS at 13:57

## 2021-07-02 RX ADMIN — FAMOTIDINE 20 MG: 10 INJECTION INTRAVENOUS at 17:32

## 2021-07-02 RX ADMIN — PROPOFOL 5 MCG/KG/MIN: 10 INJECTION, EMULSION INTRAVENOUS at 11:00

## 2021-07-02 RX ADMIN — FENTANYL CITRATE 100 MCG: 50 INJECTION, SOLUTION INTRAMUSCULAR; INTRAVENOUS at 16:13

## 2021-07-02 RX ADMIN — Medication 25 MCG/HR: at 17:41

## 2021-07-02 RX ADMIN — LEVETIRACETAM INJECTION 1000 MG: 10 INJECTION INTRAVENOUS at 18:35

## 2021-07-02 RX ADMIN — FENTANYL CITRATE 200 MCG: 50 INJECTION, SOLUTION INTRAMUSCULAR; INTRAVENOUS at 16:46

## 2021-07-02 RX ADMIN — PROPOFOL 50 MCG/KG/MIN: 10 INJECTION, EMULSION INTRAVENOUS at 14:55

## 2021-07-02 NOTE — ASSESSMENT & PLAN NOTE
Hx of on topamax and depakote  -loaded with keppra  -another 7/3, possibly exacerbated by electrolyte abnormalities and decrease in precedex after extubation. Does not appear to have NCSE    Plan:  -May need neurology consultation if re-occurrence of seizures despite use of home meds + keppra and replenisment of electrolytes  -Seizure precautions  -Ativan as needed  -EEG if encephalopathy persists since patient has a hx of nonconvulsive seizures

## 2021-07-02 NOTE — ASSESSMENT & PLAN NOTE
Post traumatic seizure with decreased LOC requiring intubation  Keppra 1500mg IV as of 7/5, cont home depakote 500mg BID, home topamax held on 7/7  Seizure precautions, IV ativan 4mg PRN for seizure activity  Continuous EEG per neuro-  appears to be nonepileptic.  Per chart review with patient's primary neurologist Dr. Borrero patient has history of nonepileptic seizures  1. DC EEG  2.  Continue the Depakote 500 mg twice daily  3. DC the Keppra   4. Continue Topamax 100 mg twice daily  5.  Seizure precautions  6.  Avoid the as needed Ativan for now.  Call if seizure activity persists longer than 5 to 10 minutes.  7.  Discontinue Vimpat 200 mg twice daily

## 2021-07-02 NOTE — ASSESSMENT & PLAN NOTE
Intubated en route for low GCS/airway protection on 7/2  -extubated 7/3, improving    Plan:  -Protecting airway, no need for re-intubation at this time

## 2021-07-02 NOTE — CONSULTS
Hospital Medicine Consultation    Date of Service  7/2/2021    Referring Physician  Dr. Shashi M.D.    Consulting Physician  Pal Blackmon M.D.    Reason for Consultation  seizure    History of Presenting Illness  66 y.o. adult who presented 7/2/2021 with fall off ladder and subsequent seizure.  Mr. 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 Melissa Bloch, neurology.  He also has a history of Parkinson disease with intolerance to Sinemet, migraine headaches, and dementia on Namenda.  He reportedly was on a 10 foot ladder and had a fall with a witnessed seizure after the fall for which paramedics were called and who apparently was given 5mg intramuscular Versed and due to decreased level of consciousness required intubation for airway protection.  He was brought to this trauma center where he underwent extensive trauma work-up and was admitted to the trauma intensive care unit by Dr. Danielle.  After the trauma work-up has been negative Dr. Danielle has requested that he is transitioned to the medical service.   At this point time, we do not have any friends nor family to corroborate his history present illness nor review of systems.  I have spoke with Dr. Danielle in person as well Dr. Orr, critical care.   Review of Systems  Review of Systems   Unable to perform ROS: Intubated       Past Medical History  Parkinson disease, seizure disorder status post head trauma, dementia, hydrocephalus     Surgical History  History of achilles repair  Hx  shunt 2011 due to hydrocephalus    Family History  This cannot be ascertained as he is intubated  Social History       Medications  None       Allergies  Not on File    Physical Exam  Temp:  [36.2 °C (97.1 °F)] 36.2 °C (97.1 °F)  Pulse:  [60-72] 60  Resp:  [18-20] 20  BP: (115-148)/(78-98) 142/85  SpO2:  [93 %-97 %] 97 %    Physical Exam  Vitals and nursing note reviewed.   Constitutional:        Appearance: [place] is not toxic-appearing or diaphoretic.   HENT:      Head:      Comments: Left forehead abrasion and bruise     Mouth/Throat:      Mouth: Mucous membranes are dry.      Comments: Endotracheal tube in place  Eyes:      General: No scleral icterus.     Conjunctiva/sclera: Conjunctivae normal.      Comments: Pupils sluggish but reactive   Neck:      Comments: Hard cervical collar on  Cardiovascular:      Rate and Rhythm: Normal rate and regular rhythm.      Heart sounds: No murmur heard.     Pulmonary:      Comments: Vent, good air movement bilaterally  No wheezing nor rhonchi  Abdominal:      General: There is no distension.      Palpations: Abdomen is soft.      Tenderness: There is no abdominal tenderness.   Genitourinary:     Comments: Conley catheter  Musculoskeletal:      Right lower leg: No edema.      Left lower leg: No edema.   Skin:     General: Skin is warm and dry.   Neurological:      Comments: He is moving his upper extremities equally though not to command   Psychiatric:      Comments: Sedated on propofol  He does not follow  Slight withdrawal to painful stimuli         Fluids  Date 07/02/21 0700 - 07/03/21 0659   Shift 3253-9570 2910-3696 2617-6408 24 Hour Total   INTAKE   I.V. 0   0   Blood 0   0   Shift Total 0   0   OUTPUT   Other 0   0   Blood 0   0   Shift Total 0   0   Weight (kg) 104.8 104.8 104.8 104.8       Laboratory  Recent Labs     07/02/21  1056   WBC 7.6   RBC 4.87   HEMOGLOBIN 15.6   HEMATOCRIT 45.0   MCV 92.4   MCH 32.0   MCHC 34.7   RDW 42.2   PLATELETCT 187   MPV 9.8                         Imaging  CT-TSPINE W/O PLUS RECONS   Final Result      Degenerative changes of the thoracic spine without acute osseous abnormality.      CT-LSPINE W/O PLUS RECONS   Final Result      1.  No lumbar spine fracture.   2.  Mild lower lumbar spine degenerative changes with associated minimal retrolisthesis at L3-4 and L4-5.      CT-HEAD W/O   Final Result      1.   Hydrocephalus with ventriculostomy in place.   2.  No acute intracranial hemorrhage or territorial infarct.   3.  Chronic sphenoid sinus disease.      CT-MAXILLOFACIAL W/O PLUS RECONS   Final Result      1.  No facial fracture.   2.  Chronic sphenoid sinus disease.      CT-CSPINE WITHOUT PLUS RECONS   Final Result      Degenerative postsurgical changes of the cervical spine without acute osseous abnormality.      CT-CHEST,ABDOMEN,PELVIS WITH   Final Result      1.  No evidence for acute intrathoracic injury.   2.  No evidence for acute intra-abdominal trauma.         DX-CHEST-LIMITED (1 VIEW)   Final Result      1.  No acute cardiopulmonary disease.   2.  Endotracheal tube terminates below the level of the thoracic inlet.   3.  Enteric feeding tube terminates in the left upper quadrant.      US-ABORTED US PROCEDURE    (Results Pending)   DX-CHEST-PORTABLE (1 VIEW)    (Results Pending)       Assessment/Plan  * Respiratory failure following trauma (HCC)- (present on admission)  Assessment & Plan  He was intubated prior to arrival due to decreased level of consciousness  Critical care has consulted for ventilator management    Seizure ([facility])- (present on admission)  Assessment & Plan  Post traumatic seizure with decreased LOC requiring intubation  He has been loaded with IV Keppra and will be kept on 1,000 mg IV BID until he is tolerating orals  EEG will be considered once he is off the IV propofol drip  Restart home Topamax 50 mg BID and Depakote 500 mg BID  He is followed by Dr. Bloch, neurology as an outpatient    Trauma- (present on admission)  Assessment & Plan  Fall from ladder with head trauma  Extensive trauma imaging in the ER was negative  Dr. Danielle, trauma surgery, was consulted   He has been cleared for removal of the hard collar    Parkinson disease ([facility])- (present on admission)  Assessment & Plan  History of  He has not tolerated Sinemet in the past    Dementia ([facility])- (present on admission)  Assessment &  Plan  History of  Followed by Dr. Bloch  He is on Namenda      Hydrocephalus (HCC)- (present on admission)  Assessment & Plan  History of  shunt 2011    Obesity (BMI 30.0-34.9)- (present on admission)  Assessment & Plan  BMI 33

## 2021-07-02 NOTE — PROGRESS NOTES
Patient arrived to [facility] at approximately 1130.      VSS  HR 77  /70  SpO2 98%  RR 29  Temp 97.6F  Weight 90.5kg    2 RN skin check complete.  Skin assessment/areas of concern:  -Small abrasion to R knee  -Small abrasion to L forehead  -Puncture site to R femoral with gauze/tegaderm dressing in place.  Old blood, otherwise intact.  -Sacrum intact and blanching, mepilex placed.    CHG bath complete.  Wound LDAs/photos not necessary at this time.    Devices in place:  -ETT with holister pads to BL cheeks, C-collar, EKG leads, BP cuff to RUE, x3 PIVs, SpO2 probe, BL soft wrist restraints, malagon catheter with stat lock to R medial thigh, SCDs    Interventions:  -Q2h turns, pillows in place for support/positioning, repositioning of devices q2h and PRN, preventative mepilex

## 2021-07-02 NOTE — CONSULTS
Critical Care Consultation    Date of consult: 7/2/2021    Referring Physician  Bobby Danielle M.D.    Reason for Consultation  Critical care management for acute encephalopathy after seizure activity after falling off of a ladder    History of Presenting Illness  All history was obtained from the ER physician, trauma physician, and old chart since the patient was intubated and sedated at the time of my evaluation.    Mr. Young is a 66 year old male with past medical history significant for seizure disorder after a traumatic brain injury in 2006 that also resulted in hydrocephalus requiring a  shunt who was brought in as a trauma red after falling off of a ladder of approximately 10 feet that was witnessed by neighbors.  Apparently the patient had loss of consciousness but then regained and in his mental status improved to a GCS of 14 when EMS arrived.  The patient was being transported to [facility] when he had a tonic-clonic seizure and then a GCS of 6.  The patient was then intubated for airway protection.  Upon arrival to the emergency department the patient underwent trauma survey and did not find any obvious injury.  I spoke with Dr. Danielle with the trauma service who asked the critical care team to take over care at this time for continued medical management of acute encephalopathy, seizure disorder, and acute hypoxic respiratory failure.    Code Status  FULL CODE    Review of Systems  Review of Systems   Unable to perform ROS: Intubated       Past Medical History  Anxiety, chronic pain, complex partial epilepsy, depression, traumatic brain injury in 2006, hydrocephalus with a  shunt, postconcussive syndrome, suicide attempt.    Surgical History  • ABDOMINAL EXPLORATION       • ACHILLES TENDON REPAIR       • CRANIOTOMY       • HERNIA REP HIATAL         times 2   • HERNIA REPAIR       • LAMINOTOMY       Family History  • Alcohol abuse Father     • ADD / ADHD Maternal Grandfather     • ADD  / ADHD Maternal Grandmother     • Dementia Maternal Grandmother             Social History   No history of smoking, drinks 2 glasses of wine per week, no illegal drugs    Medications  Home Medications    **Home medications have not yet been reviewed for this encounter**       Current Facility-Administered Medications   Medication Dose Route Frequency Provider Last Rate Last Admin   • propofol (DIPRIVAN) injection  0-80 mcg/kg/min Intravenous Continuous Paris Hses R.N. 25.2 mL/hr at 07/02/21 1154 40 mcg/kg/min at 07/02/21 1154   • Respiratory Therapy Consult   Nebulization Continuous RT Angelique Orr M.D.       • famotidine (PEPCID) tablet 20 mg  20 mg Enteral Tube Q12HRS Angelique Orr M.D.        Or   • famotidine (PEPCID) injection 20 mg  20 mg Intravenous Q12HRS Angelique Orr M.D.       • senna-docusate (PERICOLACE or SENOKOT S) 8.6-50 MG per tablet 2 tablet  2 tablet Enteral Tube BID Angelique Orr M.D.        And   • polyethylene glycol/lytes (MIRALAX) PACKET 1 Packet  1 Packet Enteral Tube QDAY PRN Angelique Orr M.D.        And   • magnesium hydroxide (MILK OF MAGNESIA) suspension 30 mL  30 mL Enteral Tube QDAY PRN Angelique Orr M.D.        And   • bisacodyl (DULCOLAX) suppository 10 mg  10 mg Rectal QDAY PRN Angelique Orr M.D.       • MD Alert...ICU Electrolyte Replacement per Pharmacy   Other PHARMACY TO DOSE Angelique Orr M.D.       • lidocaine (XYLOCAINE) 1 % injection 2 mL  2 mL Tracheal Tube Q30 MIN PRN Angelique Orr M.D.       • NS infusion   Intravenous Continuous Angelique Orr M.D.       • fentaNYL (SUBLIMAZE) injection 100 mcg  100 mcg Intravenous Q15 MIN PRN Angelique Orr M.D.        And   • fentaNYL (SUBLIMAZE) injection 200 mcg  200 mcg Intravenous Q15 MIN PRN Angelique Orr M.D.        And   • fentaNYL (SUBLIMAZE) 50 mcg/mL in 50mL (Continuous Infusion)   Intravenous Continuous Angelique Orr M.D.        And   • propofol (DIPRIVAN) injection   0-80 mcg/kg/min Intravenous Continuous Angelique Orr M.D.       • levETIRAcetam (KEPPRA) 3,150 mg in  mL IVPB  30 mg/kg Intravenous Once Angelique Orr M.D.       • LORazepam (ATIVAN) injection 2-4 mg  2-4 mg Intravenous Q5 MIN PRN Angelique Orr M.D.         No current outpatient medications on file.       Allergies  Not on File    Vital Signs last 24 hours  Temp:  [36.2 °C (97.1 °F)] 36.2 °C (97.1 °F)  Pulse:  [60-72] 60  Resp:  [18-20] 20  BP: (115-148)/(78-98) 142/85  SpO2:  [93 %-97 %] 97 %    Physical Exam  Physical Exam  Vitals and nursing note reviewed.   Constitutional:       Appearance: [place] is ill-appearing. [place] is not toxic-appearing.      Comments: Sedated, appears younger than stated age   HENT:      Head: Normocephalic and atraumatic.      Right Ear: External ear normal.      Left Ear: External ear normal.      Nose: Nose normal. No rhinorrhea.      Mouth/Throat:      Mouth: Mucous membranes are moist.      Comments: ETT in place  Eyes:      General: No scleral icterus.     Conjunctiva/sclera: Conjunctivae normal.      Pupils: Pupils are equal, round, and reactive to light.   Cardiovascular:      Rate and Rhythm: Normal rate and regular rhythm.      Pulses: Normal pulses.      Heart sounds: Normal heart sounds. No murmur heard.     Pulmonary:      Effort: No respiratory distress.      Breath sounds: Normal breath sounds. No wheezing.      Comments: Breathing comfortably with the vent  Chest:      Chest wall: No tenderness.   Abdominal:      General: Bowel sounds are normal. There is no distension.      Palpations: Abdomen is soft.      Tenderness: There is no abdominal tenderness. There is no guarding or rebound.   Musculoskeletal:         General: Normal range of motion.      Cervical back: Normal range of motion and neck supple.      Right lower leg: No edema.      Left lower leg: No edema.   Lymphadenopathy:      Cervical: No cervical adenopathy.   Skin:      General: Skin is warm and dry.      Capillary Refill: Capillary refill takes less than 2 seconds.      Findings: No rash.   Neurological:      Cranial Nerves: No cranial nerve deficit.      Sensory: No sensory deficit.      Motor: No weakness.      Comments: Sedated, somewhat restless, moving all extremities   Psychiatric:      Comments: Unable to assess         Fluids    Intake/Output Summary (Last 24 hours) at 7/2/2021 1301  Last data filed at 7/2/2021 1106  Gross per 24 hour   Intake 0 ml   Output 0 ml   Net 0 ml       Laboratory  Recent Results (from the past 48 hour(s))   CBC WITHOUT DIFFERENTIAL    Collection Time: 07/02/21 10:56 AM   Result Value Ref Range    WBC 7.6 4.8 - 10.8 K/uL    RBC 4.87 4.70 - 6.10 M/uL    Hemoglobin 15.6 14.0 - 18.0 g/dL    Hematocrit 45.0 42.0 - 52.0 %    MCV 92.4 81.4 - 97.8 fL    MCH 32.0 27.0 - 33.0 pg    MCHC 34.7 33.6 - 35.0 g/dL    RDW 42.2 35.9 - 50.0 fL    Platelet Count 187 164 - 446 K/uL    MPV 9.8 9.0 - 12.9 fL   PLATELET MAPPING WITH BASIC TEG    Collection Time: 07/02/21 10:56 AM   Result Value Ref Range    Reaction Time Initial-R 5.0 5.0 - 10.0 min    Clot Kinetics-K 2.2 1.0 - 3.0 min    Clot Angle-Angle 61.8 53.0 - 72.0 degrees    Maximum Clot Strength-MA 58.4 50.0 - 70.0 mm    Lysis 30 minutes-LY30 0.0 0.0 - 8.0 %    % Inhibition ADP 11.9 %    % Inhibition AA 0.0 %    TEG Algorithm Link Algorithm    COD - Adult (Type and Screen)    Collection Time: 07/02/21 10:56 AM   Result Value Ref Range    ABO Grouping Only O     Rh Grouping Only POS     Antibody Screen-Cod NEG    LACTIC ACID    Collection Time: 07/02/21 10:56 AM   Result Value Ref Range    Lactic Acid 2.1 (H) 0.5 - 2.0 mmol/L       Imaging  CT head:  1.  Hydrocephalus with ventriculostomy in place.  2.  No acute intracranial hemorrhage or territorial infarct.  3.  Chronic sphenoid sinus disease.    Assessment/Plan  * Respiratory failure following trauma (HCC)- (present on admission)  Assessment & Plan  Intubated  en route for low GCS/airway protection on 7/2  Cont full vent support   RT/O2 protocols  Cont vent bundle protocols  I am actively adjusting vent settings based on ABGs  SAT/SBTs    Acute encephalopathy- (present on admission)  Assessment & Plan  Likely due to post-ictal state  Limit sedatives  CT head ok    Dementia (HCC)- (present on admission)  Assessment & Plan  Hx of    Seizure (HCC)- (present on admission)  Assessment & Plan  Hx of on topamax and depakote  Will load with Keppra and cont 1g BID  May need neurology consultation  Seizure precautions  Ativan as needed  May need EEG if encephalopathy persists since patient has a hx of nonconvulsive seizures    Hydrocephalus (HCC)- (present on admission)  Assessment & Plan  Noted on CT head   shunt appears to be functioning    Trauma- (present on admission)  Assessment & Plan  Trauma services performed primary/secondary survery and no injuries noted  C spine cleared and C collar removed  PT/OT      Discussed patient condition and risk of morbidity and/or mortality with Hospitalist, RN, RT, Pharmacy, Charge nurse / hot rounds and ER MD and Dr. Bobby Danielle.    The patient remains critically ill.  Critical care time = 48 minutes in directly providing and coordinating critical care and extensive data review.  No time overlap and excludes procedures.

## 2021-07-02 NOTE — ASSESSMENT & PLAN NOTE
Trauma services performed primary/secondary survery and no injuries noted  C spine cleared and C collar removed  PT/OT

## 2021-07-02 NOTE — CARE PLAN
Problem: Ventilation  Goal: Ability to achieve and maintain unassisted ventilation or tolerate decreased levels of ventilator support  Description: Document on Vent flowsheet    1.  Support and monitor invasive and noninvasive mechanical ventilation  2.  Monitor ventilator weaning response  3.  Perform ventilator associated pneumonia prevention interventions  4.  Manage ventilation therapy by monitoring diagnostic test results  Outcome: Progressing      Ventilator Daily Summary    Vent Day # 1  7.5 @ 25    APVCMV: 20/440/8/50%    Plan: Continue current ventilator settings and wean mechanical ventilation as tolerated per physician orders.

## 2021-07-02 NOTE — ASSESSMENT & PLAN NOTE
He was intubated prior to arrival due to decreased level of consciousness  Extubated 7/4.  Encourage incentive spirometry and deep breathing efforts  Mobilize

## 2021-07-02 NOTE — DISCHARGE PLANNING
LSW spoke to pt's wife, Kallie, who reports she will be coming to the hospital once pt is settled in. Kallie aware pt is at [facility] and will be admitted. Kallie would appreciate phone updates.     Best contact to reach Kallie is: [phone] (mobile). Kallie is pt's legal NOK.     Plan: Update Kallie as appropriate, notify once room is obtained. Remain available for support as needed.

## 2021-07-02 NOTE — ED PROVIDER NOTES
"ED Provider Note    CHIEF COMPLAINT  Fall from ladder/seizure    [facility]  [place](Rico Young) is a 66 y.o. adult who presents by EMS after having fallen off a ladder witnessed by neighbors.  The.  He fell from an unknown height but at least higher than 10 feet.  The patient has a positive loss of consciousness but then regained and improved mental status with a GCS of 14 according to EMS personnel.  The patient then had seizure activity with a GCS of 6 and was then orally intubated after being given a dose of Versed as well as rocuronium.  Patient was also given fentanyl prior to arrival during transport.  Patient does have a history of traumatic brain injury in 2006 and has hydrocephalus with a shunt in place.  The patient has seizures and is currently on Topamax and also has a history of dementia and is followed by neurology.  No other information is available at this time and the patient is unable to provide any information.    REVIEW OF SYSTEMS  See HPI for further details.  Unobtainable from the patient due to his intubated condition    PAST MEDICAL HISTORY  1.  Seizure disorder  2.  Traumatic brain injury  3.  Hydrocephalus  4.   shunt    FAMILY HISTORY  No family history on file.    SOCIAL HISTORY  [facility] the [facility]    SURGICAL HISTORY  No past surgical history on file.    CURRENT MEDICATIONS  1.  Topamax  2.  Namenda    ALLERGIES  Not on File    PHYSICAL EXAM  VITAL SIGNS: /91   Pulse 69   Temp 36.2 °C (97.1 °F)   Resp 19 Comment: vented  Ht 1.778 m (5' 10\")   Wt 105 kg (231 lb)   SpO2 95%   BMI 33.15 kg/m²    Constitutional: 66-year-old male, orally intubated, GCS of 3T  HENT: Calvarium: Not abrasion to the left forehead area and left periorbital area, No Restrepo's sign, No racoon sign, No hemotypanum, No midface trauma, No intraoral trauma, No malocclusion; orally intubated  Eyes: PERRL; pupils are 4 mm and sluggishly reactive, no disconjugate gaze  Neck: In " line immobilization was maintained,no step-offs; Trachea: midline; No JVD;   Cardiovascular: Normal heart rate, Normal rhythm, No murmurs, No rubs, No gallops.   Thorax & Lungs:  No palpable deformities or palpable rib fractures, No subcutaneous emphysema;Equal breath sounds, Lungs are clear to auscultation,No respiratory distress.   Abdomen: Soft, Nontender without guarding, rebound or rigidity; No left or right upper quadrant tenderness; Bowel sounds normal in quality;  Genital:  External genitalia appear normal, Non tender   Skin: Warm, Dry, No erythema, No rash.   Back: No palpable deformities; No localized tenderness over the spinous processes to the thoracic/lumbar spine;  Extremities: Intact distal pulses, No edema, No tenderness, No cyanosis, No clubbing.   Musculoskeletal: No palpable deformity to the upper extremities or left lower extremities with full range of motion without discomfort; right lower extremity feels no palpable deformities; the right knee reveals abrasions anteriorly; no joint effusion or ligamentous instability;  Neurologic: GCS of 3T; currently has fentanyl and rocuronium;    RADIOLOGY/PROCEDURES  CT-TSPINE W/O PLUS RECONS   Final Result      Degenerative changes of the thoracic spine without acute osseous abnormality.      CT-LSPINE W/O PLUS RECONS   Final Result      1.  No lumbar spine fracture.   2.  Mild lower lumbar spine degenerative changes with associated minimal retrolisthesis at L3-4 and L4-5.      CT-HEAD W/O   Final Result      1.  Hydrocephalus with ventriculostomy in place.   2.  No acute intracranial hemorrhage or territorial infarct.   3.  Chronic sphenoid sinus disease.      CT-MAXILLOFACIAL W/O PLUS RECONS   Final Result      1.  No facial fracture.   2.  Chronic sphenoid sinus disease.      CT-CSPINE WITHOUT PLUS RECONS   Final Result      Degenerative postsurgical changes of the cervical spine without acute osseous abnormality.      CT-CHEST,ABDOMEN,PELVIS WITH    Final Result      1.  No evidence for acute intrathoracic injury.   2.  No evidence for acute intra-abdominal trauma.         DX-CHEST-LIMITED (1 VIEW)   Final Result      1.  No acute cardiopulmonary disease.   2.  Endotracheal tube terminates below the level of the thoracic inlet.   3.  Enteric feeding tube terminates in the left upper quadrant.      US-ABORTED US PROCEDURE    (Results Pending)         COURSE & MEDICAL DECISION MAKING  Pertinent Labs & Imaging studies reviewed. (See chart for details)  1.  Monitor  2.  IV normal saline  3.  O2: Ventilatory assistance  4.  Propofol drip    Laboratory studies: CBC shows white count of 7.6, hemoglobin 15.6, adequate 45.0; lactic acid 2.1; Covid negative;    Discussion/consultation: At this time, the patient presents after a fall off of a ladder.  Patient was seen as a trauma red due to his mechanism and the fact that he was intubated.  Imaging study showed no evidence of acute traumatic injury.  The patient did have seizure activity.  Patient was evaluated by trauma surgery, Dr. Danielle.  He felt the patient needed medical care and not traumatic care.  Therefore, I spoke with the intensivist on-call as well as the hospitalist on-call.  I would suspect the patient's altered mental status was secondary to seizure activity.  Patient was admitted to trauma ICU but will be transferred to medical ICU.    FINAL IMPRESSION  1. Closed head injury, initial encounter    2. Seizure (HCC)    3. S/P  shunt    4. Abrasion, right knee, initial encounter    5. Fall, initial encounter    6. Dementia without behavioral disturbance, unspecified dementia type (HCC)          PLAN  1.  The patient will be admitted to the ICU for further monitoring, treatment, and care.    Electronically signed by: Guy G Gansert, M.D., 7/2/2021

## 2021-07-02 NOTE — H&P
"Trauma Surgery History and Physical    Chief Complaint: Fall from ladder. seizure.     History of Present Illness: The patient is a 68 year-old White man who was injured in a fall. The patient experienced a 10 foot fall from a ladder. The patient had a brief loss of consciousness. The patient's anticoagulation history cannot be assessed. The patient is unable to articulate any subjective complaints.    Triage Category: The patient was triaged as a Trauma Red activation. An expeditious primary and secondary survey with required adjuncts was conducted. See Trauma Narrator for full details.    Past Medical History:  has no past medical history on file.    Past Surgical History:  has no past surgical history on file.    Allergies: Not on File    Current Medications:    Home Medications    **Home medications have not yet been reviewed for this encounter**         Family History: family history is not on file.    Social History:  Unknown    Review of Systems: Comprehensive review of systems is not able to be elicited from the patient secondary to the acuity of the clinical situation.    Physical Examination:     Constitutional:     Vital Signs: /84   Pulse 68   Temp 36.2 °C (97.1 °F)   Resp 18   Ht 1.778 m (5' 10\")   Wt 105 kg (231 lb)   SpO2 95%    General Appearance: The patient is an intubated.  HEENT:    Abrasion left forehead. Palpable right sided ventricular shunt. The pupils are equal, round, and reactive to light bilaterally. The extraocular muscles cannot be assessed. The ear canals and tympanic membranes are clear bilaterally. The nares and oropharynx are clear. and Indwelling orotracheal airway. The midface and jaw are stable.  No malocclusion is evident.  Neck:    The cervical spine is immobilized with a hard collar. The trachea is midline. No crepitance. No jugulovenous distension.  Respiratory:   Inspection: Mechanically ventilated.   Palpation:  The chest is nontender. The clavicles are non " deformed bilaterally.   Auscultation: clear to auscultation.  Cardiovascular:   Inspection: The skin is warm, dry and well purfused.  Auscultation: Regular rate and rhythm.   Peripheral Pulses: Normal.   Abdomen:   Inspection: Abdominal inspection reveals no abrasions, contusions, lacerations or penetrating wounds.   Palpation: Palpation is remarkable for no significant tenderness, guarding, or peritoneal findings.  Genitourinary:   (MALE): normal male external genitalia, clear yellow urine.  Musculoskeletal:   The pelvis is stable. No significant angulation, deformity, or soft tissue injury involving the upper and lower extremities.  Back:   The thoracolumbar spine was examined utilizing spinal motion restriction. Examination is remarkable for no significant tenderness, swelling, or deformity in the thoracolumbar region.  Skin:    Examination of the skin reveals an abrasion of the right knee.  Neurologic:    [place] Coma Scale (GCS) Eye Opening (no response 1), Verbal Response (no verbal response 1), Best Motor Response (no motor response 1). GCS T.    Neurologic examination reveals pharmacologic paralysis.  Psychiatric:   Cannot be assessed.    Laboratory Values:   Recent Labs     07/02/21  1056   WBC 7.6   RBC 4.87   HEMOGLOBIN 15.6   HEMATOCRIT 45.0   MCV 92.4   MCH 32.0   MCHC 34.7   RDW 42.2   PLATELETCT 187   MPV 9.8                    Imaging:   DX-CHEST-LIMITED (1 VIEW)   Final Result      1.  No acute cardiopulmonary disease.   2.  Endotracheal tube terminates below the level of the thoracic inlet.   3.  Enteric feeding tube terminates in the left upper quadrant.      CT-HEAD W/O    (Results Pending)   CT-MAXILLOFACIAL W/O PLUS RECONS    (Results Pending)   CT-CSPINE WITHOUT PLUS RECONS    (Results Pending)   CT-CHEST,ABDOMEN,PELVIS WITH    (Results Pending)   CT-TSPINE W/O PLUS RECONS    (Results Pending)   CT-LSPINE W/O PLUS RECONS    (Results Pending)   US-ABORTED US PROCEDURE    (Results Pending)        Assessment and Plan:     Seizure.  Ventilator dependent respiratory failure.  No evidence for acute traumatic injury.    Disposition: Medical evaluation and admission.  Trauma tertiary survey.       ____________________________________     Bobby Danielle M.D.    DD: 7/2/2021  10:49 AM

## 2021-07-02 NOTE — ED NOTES
66yoM BIB [facility]. Fall off ladder, + LOC. Intubated post seizure. (5mg IM versed given) H/O hydrocephalus with shunt placement

## 2021-07-02 NOTE — ASSESSMENT & PLAN NOTE
Acute generalized seizure following all. Unspecified seizure history following head injury in 2006 elicited on admission.  .Chronic condition treated with Topamax and Depakote.  Resumed maintenance medication on admission.

## 2021-07-02 NOTE — DISCHARGE PLANNING
Trauma Response    Referral: Trauma Red Response    Intervention: SW responded to trauma red.  Pt was [facility] after a fall from a ladder, unknown height.  Pt was intubated upon arrival to the ED.  Pts name is Rico Young (: 1955).  SW attempted to contact pts wife, Kallie [phone], however, no answer. VM left requesting callback.     Plan: Cont. To attempt to reach pt's wife.

## 2021-07-02 NOTE — PROGRESS NOTES
Patient transported on monitor with ACLS RN, RT, tech to r-100. Belongings bag sent with patient. Report given to LAVONNE Hernandez.

## 2021-07-02 NOTE — ASSESSMENT & PLAN NOTE
Likely due to post-ictal state again, seemed to be better before seizure this am. Also likely 2/2 high dose of ativan for patient  -CT head this am again negative for acute path    Plan:  -Monitor for sz, keep ativan low dose 1 mg at a time as provoked encephalopathy  -precedex to keep patient from severe agitation causing self harm, titrate down as tolerated  -lovenox tomorrow if stable

## 2021-07-02 NOTE — PROGRESS NOTES
Patient arrived to [facility] at approximately 1130.      VSS  HR 77  /86  SpO2 98%  RR 29  Temp 97.6F  Weight 90.5kg

## 2021-07-02 NOTE — ASSESSMENT & PLAN NOTE
Hx of, without severe deficit, also h/o parkinson's noted with sinemet intolerance unclear if contributing to dementia

## 2021-07-03 ENCOUNTER — APPOINTMENT (OUTPATIENT)
Dept: RADIOLOGY | Facility: MEDICAL CENTER | Age: 66
DRG: 880 | End: 2021-07-03
Attending: INTERNAL MEDICINE
Payer: COMMERCIAL

## 2021-07-03 ENCOUNTER — APPOINTMENT (OUTPATIENT)
Dept: RADIOLOGY | Facility: MEDICAL CENTER | Age: 66
DRG: 880 | End: 2021-07-03
Attending: STUDENT IN AN ORGANIZED HEALTH CARE EDUCATION/TRAINING PROGRAM
Payer: COMMERCIAL

## 2021-07-03 LAB
ABO + RH BLD: NORMAL
ALBUMIN SERPL BCP-MCNC: 2.9 G/DL (ref 3.2–4.9)
ANION GAP SERPL CALC-SCNC: 8 MMOL/L (ref 7–16)
BASE EXCESS BLDA CALC-SCNC: -3 MMOL/L (ref -4–3)
BASOPHILS # BLD AUTO: 0.6 % (ref 0–1.8)
BASOPHILS # BLD: 0.06 K/UL (ref 0–0.12)
BODY TEMPERATURE: ABNORMAL DEGREES
BREATHS SETTING VENT: 18
BUN SERPL-MCNC: 6 MG/DL (ref 8–22)
CALCIUM SERPL-MCNC: 6.5 MG/DL (ref 8.5–10.5)
CHLORIDE SERPL-SCNC: 116 MMOL/L (ref 96–112)
CO2 BLDA-SCNC: 23 MMOL/L (ref 20–33)
CO2 SERPL-SCNC: 19 MMOL/L (ref 20–33)
CREAT SERPL-MCNC: 0.48 MG/DL (ref 0.5–1.4)
DELSYS IDSYS: ABNORMAL
END TIDAL CARBON DIOXIDE IECO2: 31 MMHG
EOSINOPHIL # BLD AUTO: 0.38 K/UL (ref 0–0.51)
EOSINOPHIL NFR BLD: 3.7 % (ref 0–6.9)
ERYTHROCYTE [DISTWIDTH] IN BLOOD BY AUTOMATED COUNT: 43.3 FL (ref 35.9–50)
GLUCOSE SERPL-MCNC: 102 MG/DL (ref 65–99)
HCO3 BLDA-SCNC: 21.5 MMOL/L (ref 17–25)
HCT VFR BLD AUTO: 38.2 % (ref 42–52)
HGB BLD-MCNC: 12.8 G/DL (ref 14–18)
HOROWITZ INDEX BLDA+IHG-RTO: 253 MM[HG]
IMM GRANULOCYTES # BLD AUTO: 0.05 K/UL (ref 0–0.11)
IMM GRANULOCYTES NFR BLD AUTO: 0.5 % (ref 0–0.9)
LYMPHOCYTES # BLD AUTO: 3.32 K/UL (ref 1–4.8)
LYMPHOCYTES NFR BLD: 32.2 % (ref 22–41)
MAGNESIUM SERPL-MCNC: 1.5 MG/DL (ref 1.5–2.5)
MCH RBC QN AUTO: 31.8 PG (ref 27–33)
MCHC RBC AUTO-ENTMCNC: 33.5 G/DL (ref 33.7–35.3)
MCV RBC AUTO: 95 FL (ref 81.4–97.8)
MODE IMODE: ABNORMAL
MONOCYTES # BLD AUTO: 1.16 K/UL (ref 0–0.85)
MONOCYTES NFR BLD AUTO: 11.3 % (ref 0–13.4)
NEUTROPHILS # BLD AUTO: 5.33 K/UL (ref 1.82–7.42)
NEUTROPHILS NFR BLD: 51.7 % (ref 44–72)
NRBC # BLD AUTO: 0 K/UL
NRBC BLD-RTO: 0 /100 WBC
O2/TOTAL GAS SETTING VFR VENT: 30 %
PCO2 BLDA: 35.4 MMHG (ref 26–37)
PCO2 TEMP ADJ BLDA: 34.1 MMHG (ref 26–37)
PEEP END EXPIRATORY PRESSURE IPEEP: 8 CMH20
PH BLDA: 7.39 [PH] (ref 7.4–7.5)
PH TEMP ADJ BLDA: 7.4 [PH] (ref 7.4–7.5)
PHOSPHATE SERPL-MCNC: 1.9 MG/DL (ref 2.5–4.5)
PLATELET # BLD AUTO: 139 K/UL (ref 164–446)
PMV BLD AUTO: 10.3 FL (ref 9–12.9)
PO2 BLDA: 76 MMHG (ref 64–87)
PO2 TEMP ADJ BLDA: 72 MMHG (ref 64–87)
POTASSIUM SERPL-SCNC: 3.3 MMOL/L (ref 3.6–5.5)
RBC # BLD AUTO: 4.02 M/UL (ref 4.7–6.1)
SAO2 % BLDA: 95 % (ref 93–99)
SODIUM SERPL-SCNC: 143 MMOL/L (ref 135–145)
SPECIMEN DRAWN FROM PATIENT: ABNORMAL
TIDAL VOLUME IVT: 440 ML
VALPROATE SERPL-MCNC: 52 UG/ML (ref 50–100)
WBC # BLD AUTO: 10.3 K/UL (ref 4.8–10.8)

## 2021-07-03 PROCEDURE — 82040 ASSAY OF SERUM ALBUMIN: CPT

## 2021-07-03 PROCEDURE — 84100 ASSAY OF PHOSPHORUS: CPT

## 2021-07-03 PROCEDURE — 80164 ASSAY DIPROPYLACETIC ACD TOT: CPT

## 2021-07-03 PROCEDURE — 94150 VITAL CAPACITY TEST: CPT

## 2021-07-03 PROCEDURE — 80048 BASIC METABOLIC PNL TOTAL CA: CPT

## 2021-07-03 PROCEDURE — 94799 UNLISTED PULMONARY SVC/PX: CPT

## 2021-07-03 PROCEDURE — 700101 HCHG RX REV CODE 250: Performed by: INTERNAL MEDICINE

## 2021-07-03 PROCEDURE — 700102 HCHG RX REV CODE 250 W/ 637 OVERRIDE(OP): Performed by: INTERNAL MEDICINE

## 2021-07-03 PROCEDURE — 99291 CRITICAL CARE FIRST HOUR: CPT | Performed by: INTERNAL MEDICINE

## 2021-07-03 PROCEDURE — A9270 NON-COVERED ITEM OR SERVICE: HCPCS | Performed by: INTERNAL MEDICINE

## 2021-07-03 PROCEDURE — 700111 HCHG RX REV CODE 636 W/ 250 OVERRIDE (IP): Performed by: STUDENT IN AN ORGANIZED HEALTH CARE EDUCATION/TRAINING PROGRAM

## 2021-07-03 PROCEDURE — 85025 COMPLETE CBC W/AUTO DIFF WBC: CPT

## 2021-07-03 PROCEDURE — 700105 HCHG RX REV CODE 258: Performed by: INTERNAL MEDICINE

## 2021-07-03 PROCEDURE — 770022 HCHG ROOM/CARE - ICU (200)

## 2021-07-03 PROCEDURE — 71045 X-RAY EXAM CHEST 1 VIEW: CPT

## 2021-07-03 PROCEDURE — 700101 HCHG RX REV CODE 250: Performed by: STUDENT IN AN ORGANIZED HEALTH CARE EDUCATION/TRAINING PROGRAM

## 2021-07-03 PROCEDURE — 700111 HCHG RX REV CODE 636 W/ 250 OVERRIDE (IP): Performed by: INTERNAL MEDICINE

## 2021-07-03 PROCEDURE — 82803 BLOOD GASES ANY COMBINATION: CPT

## 2021-07-03 PROCEDURE — 700105 HCHG RX REV CODE 258: Performed by: STUDENT IN AN ORGANIZED HEALTH CARE EDUCATION/TRAINING PROGRAM

## 2021-07-03 PROCEDURE — 36600 WITHDRAWAL OF ARTERIAL BLOOD: CPT

## 2021-07-03 PROCEDURE — 94003 VENT MGMT INPAT SUBQ DAY: CPT

## 2021-07-03 PROCEDURE — 70450 CT HEAD/BRAIN W/O DYE: CPT

## 2021-07-03 PROCEDURE — 83735 ASSAY OF MAGNESIUM: CPT

## 2021-07-03 PROCEDURE — 700111 HCHG RX REV CODE 636 W/ 250 OVERRIDE (IP): Performed by: HOSPITALIST

## 2021-07-03 RX ORDER — DEXMEDETOMIDINE HYDROCHLORIDE 4 UG/ML
.1-1.5 INJECTION, SOLUTION INTRAVENOUS CONTINUOUS
Status: DISCONTINUED | OUTPATIENT
Start: 2021-07-03 | End: 2021-07-04

## 2021-07-03 RX ORDER — DULOXETIN HYDROCHLORIDE 30 MG/1
30 CAPSULE, DELAYED RELEASE ORAL EVERY EVENING
Status: DISCONTINUED | OUTPATIENT
Start: 2021-07-03 | End: 2021-07-03

## 2021-07-03 RX ORDER — CALCIUM CHLORIDE 100 MG/ML
1 INJECTION INTRAVENOUS; INTRAVENTRICULAR ONCE
Status: DISCONTINUED | OUTPATIENT
Start: 2021-07-03 | End: 2021-07-03

## 2021-07-03 RX ORDER — POTASSIUM CHLORIDE 7.45 MG/ML
10 INJECTION INTRAVENOUS
Status: COMPLETED | OUTPATIENT
Start: 2021-07-03 | End: 2021-07-03

## 2021-07-03 RX ORDER — MAGNESIUM SULFATE HEPTAHYDRATE 40 MG/ML
4 INJECTION, SOLUTION INTRAVENOUS ONCE
Status: COMPLETED | OUTPATIENT
Start: 2021-07-03 | End: 2021-07-03

## 2021-07-03 RX ORDER — DULOXETIN HYDROCHLORIDE 30 MG/1
30 CAPSULE, DELAYED RELEASE ORAL EVERY EVENING
Status: DISCONTINUED | OUTPATIENT
Start: 2021-07-04 | End: 2021-07-04

## 2021-07-03 RX ADMIN — POTASSIUM CHLORIDE 10 MEQ: 10 INJECTION, SOLUTION INTRAVENOUS at 15:21

## 2021-07-03 RX ADMIN — DEXMEDETOMIDINE 0.6 MCG/KG/HR: 200 INJECTION, SOLUTION INTRAVENOUS at 20:38

## 2021-07-03 RX ADMIN — LEVETIRACETAM INJECTION 1000 MG: 10 INJECTION INTRAVENOUS at 05:09

## 2021-07-03 RX ADMIN — TOPIRAMATE 50 MG: 25 TABLET, FILM COATED ORAL at 15:20

## 2021-07-03 RX ADMIN — PROPOFOL 30 MCG/KG/MIN: 10 INJECTION, EMULSION INTRAVENOUS at 03:01

## 2021-07-03 RX ADMIN — LEVETIRACETAM INJECTION 1000 MG: 10 INJECTION INTRAVENOUS at 17:10

## 2021-07-03 RX ADMIN — DOCUSATE SODIUM 50 MG AND SENNOSIDES 8.6 MG 2 TABLET: 8.6; 5 TABLET, FILM COATED ORAL at 05:10

## 2021-07-03 RX ADMIN — POTASSIUM CHLORIDE 10 MEQ: 10 INJECTION, SOLUTION INTRAVENOUS at 14:43

## 2021-07-03 RX ADMIN — LORAZEPAM 4 MG: 2 INJECTION INTRAMUSCULAR; INTRAVENOUS at 07:47

## 2021-07-03 RX ADMIN — TOPIRAMATE 50 MG: 25 TABLET, FILM COATED ORAL at 05:10

## 2021-07-03 RX ADMIN — POTASSIUM PHOSPHATE, MONOBASIC AND POTASSIUM PHOSPHATE, DIBASIC 30 MMOL: 224; 236 INJECTION, SOLUTION, CONCENTRATE INTRAVENOUS at 10:09

## 2021-07-03 RX ADMIN — CALCIUM CHLORIDE 1000 MG: 100 INJECTION, SOLUTION INTRAVENOUS at 06:22

## 2021-07-03 RX ADMIN — FAMOTIDINE 20 MG: 20 TABLET ORAL at 05:10

## 2021-07-03 RX ADMIN — DEXTROSE MONOHYDRATE 500 MG: 50 INJECTION, SOLUTION INTRAVENOUS at 17:10

## 2021-07-03 RX ADMIN — DIVALPROEX SODIUM 500 MG: 125 CAPSULE ORAL at 05:09

## 2021-07-03 RX ADMIN — PROCHLORPERAZINE EDISYLATE 10 MG: 5 INJECTION INTRAMUSCULAR; INTRAVENOUS at 15:31

## 2021-07-03 RX ADMIN — DEXTROSE MONOHYDRATE 500 MG: 50 INJECTION, SOLUTION INTRAVENOUS at 11:09

## 2021-07-03 RX ADMIN — DEXMEDETOMIDINE 0.2 MCG/KG/HR: 200 INJECTION, SOLUTION INTRAVENOUS at 12:07

## 2021-07-03 RX ADMIN — MAGNESIUM SULFATE IN WATER 4 G: 40 INJECTION, SOLUTION INTRAVENOUS at 09:30

## 2021-07-03 ASSESSMENT — PULMONARY FUNCTION TESTS: FVC: 1.1

## 2021-07-03 NOTE — PROGRESS NOTES
CRITICAL CARE MEDICINE ATTENDING PROGRESS NOTE    Date of admission  7/2/2021    Chief Complaint  66 y.o. adult admitted 7/2/2021 with a seizure resulting in a fall off a ladder.  He had a subsequent seizure witnessed by EMS and was intubated for airway protection.  He has a history of seizures following a TBI in 2006, prior  shunt placement for hydrocephalus and dementia.    Hospital Course      7/3 -    vent day 2.  SAT/SBT.  Continue AEDs.  Replete electrolytes.      Interval Problem Update      SR  SAT/SBT  99.0  +1435 mL in the last 24 and since admit  Replete potassium, phosphorus, magnesium and calcium      Review of Systems  Review of Systems   Unable to perform ROS: Acuity of condition       Vital Signs for the last 24 hours  Temp:  [36.2 °C (97.1 °F)-36.6 °C (97.9 °F)] 36.6 °C (97.9 °F)  Pulse:  [54-72] 69  Resp:  [17-23] 22  BP: ()/(56-98) 101/69  SpO2:  [93 %-100 %] 99 %    Hemodynamic parameters for the last 24 hours       Vent Settings for the last 24 hours  Vent Mode: APVCMV  Rate (breaths/min): 18  Vt Target (mL): 440  PEEP/CPAP: 8  MAP: 12  Control VTE (exp VT): 432    Physical Exam  Physical Exam  Constitutional:       Appearance: [place] is not diaphoretic.      Comments: On ventilator   HENT:      Head: Normocephalic.      Right Ear: External ear normal.      Left Ear: External ear normal.      Nose: Nose normal.      Mouth/Throat:      Mouth: Mucous membranes are moist.      Pharynx: Oropharynx is clear.   Eyes:      Conjunctiva/sclera: Conjunctivae normal.      Pupils: Pupils are equal, round, and reactive to light.   Cardiovascular:      Pulses: Normal pulses.      Heart sounds: No murmur heard.   No gallop.       Comments: Sinus rhythm  Pulmonary:      Breath sounds: No wheezing or rales.   Abdominal:      General: There is no distension.      Tenderness: There is no abdominal tenderness. There is no guarding.   Musculoskeletal:      Cervical back: Normal range of motion and  neck supple.      Right lower leg: No edema.      Left lower leg: No edema.      Comments: No clubbing or cyanosis   Skin:     General: Skin is warm and dry.   Neurological:      Comments: Sedated.  Arouses.  Moves all 4.         Medications  Current Facility-Administered Medications   Medication Dose Route Frequency Provider Last Rate Last Admin   • Respiratory Therapy Consult   Nebulization Continuous RT Angelique Orr M.D.       • famotidine (PEPCID) tablet 20 mg  20 mg Enteral Tube Q12HRS Angelique Orr M.D.        Or   • famotidine (PEPCID) injection 20 mg  20 mg Intravenous Q12HRS Angelique Orr M.D.   20 mg at 07/02/21 1732   • senna-docusate (PERICOLACE or SENOKOT S) 8.6-50 MG per tablet 2 tablet  2 tablet Enteral Tube BID Angelique Orr M.D.   2 tablet at 07/02/21 1732    And   • polyethylene glycol/lytes (MIRALAX) PACKET 1 Packet  1 Packet Enteral Tube QDAY PRN Angelique Orr M.D.        And   • magnesium hydroxide (MILK OF MAGNESIA) suspension 30 mL  30 mL Enteral Tube QDAY PRN Angelique Orr M.D.        And   • bisacodyl (DULCOLAX) suppository 10 mg  10 mg Rectal QDAY PRN Angelique Orr M.D.       • MD Alert...ICU Electrolyte Replacement per Pharmacy   Other PHARMACY TO DOSE Angelique Orr M.D.       • lidocaine (XYLOCAINE) 1 % injection 2 mL  2 mL Tracheal Tube Q30 MIN PRN Angelique Orr M.D.       • NS infusion   Intravenous Continuous Angelique Orr M.D. 83 mL/hr at 07/02/21 1357 New Bag at 07/02/21 1357   • fentaNYL (SUBLIMAZE) injection 100 mcg  100 mcg Intravenous Q15 MIN PRN Angelique Orr M.D.   100 mcg at 07/02/21 1613    And   • fentaNYL (SUBLIMAZE) injection 200 mcg  200 mcg Intravenous Q15 MIN PRN Angelique Orr M.D.   200 mcg at 07/02/21 1729    And   • fentaNYL (SUBLIMAZE) 50 mcg/mL in 50mL (Continuous Infusion)   Intravenous Continuous Angelique Orr M.D. 0.5 mL/hr at 07/02/21 1916 25 mcg/hr at 07/02/21 1916    And   • propofol (DIPRIVAN) injection   0-80 mcg/kg/min Intravenous Continuous Angelique Orr M.D. 18.9 mL/hr at 07/03/21 0301 30 mcg/kg/min at 07/03/21 0301   • LORazepam (ATIVAN) injection 2-4 mg  2-4 mg Intravenous Q5 MIN PRN Angelique Orr M.D.       • ondansetron (ZOFRAN) syringe/vial injection 4 mg  4 mg Intravenous Q4HRS PRN Pal Blackmon M.D.       • promethazine (PHENERGAN) suppository 12.5-25 mg  12.5-25 mg Rectal Q4HRS PRN Pal Blackmon M.D.       • prochlorperazine (COMPAZINE) injection 5-10 mg  5-10 mg Intravenous Q4HRS PRN Pal Blackmon M.D.       • enalaprilat (VASOTEC) injection 1.25 mg  1.25 mg Intravenous Q6HRS PRN Pal Blackmon M.D.       • labetalol (NORMODYNE/TRANDATE) injection 10 mg  10 mg Intravenous Q4HRS PRN Pal Blackmon M.D.       • levETIRAcetam (Keppra) 1000 mg in 100 mL NaCl IV premix  1,000 mg Intravenous Q12HRS Pal Blackmon M.D.   Stopped at 07/02/21 1850   • divalproex (DEPAKOTE SPRINKLE) capsule 500 mg  500 mg Enteral Tube Q12HRS Weston Gross M.D.   500 mg at 07/02/21 1732   • topiramate (TOPAMAX) tablet 50 mg  50 mg Enteral Tube BID Weston Gross M.D.   50 mg at 07/02/21 1732   • acetaminophen (Tylenol) tablet 650 mg  650 mg Enteral Tube Q6HRS PRN Weston Gross M.D.       • cloNIDine (CATAPRES) tablet 0.1 mg  0.1 mg Enteral Tube Q6HRS PRN Weston Gross M.D.       • ondansetron (ZOFRAN ODT) dispertab 4 mg  4 mg Enteral Tube Q4HRS PRN Weston Gross M.D.       • promethazine (PHENERGAN) tablet 12.5-25 mg  12.5-25 mg Enteral Tube Q4HRS PRN Weston Gross M.D.           Fluids    Intake/Output Summary (Last 24 hours) at 7/3/2021 0336  Last data filed at 7/2/2021 2000  Gross per 24 hour   Intake 1395.81 ml   Output 150 ml   Net 1245.81 ml       Laboratory  Recent Labs     07/02/21  1249 07/03/21  0253   ISTATAPH 7.359* 7.391*   ISTATAPCO2 41.1* 35.4   ISTATAPO2 150* 76   ISTATATCO2 24 23   [id number] 99 95   ISTATARTHCO3 23.2 21.5   ISTATARTBE -2 -3    ISTATTEMP see below 97.0 F   ISTATFIO2 75 30   ISTATSPEC Arterial Arterial   ISTATAPHTC  --  7.404   [id number]  --  72     Recent Labs     07/02/21  1335   SODIUM 138   POTASSIUM 4.5   CHLORIDE 104   CO2 24   BUN 8   CREATININE 0.72   CALCIUM 8.9     Recent Labs     07/02/21  1335   ALTSGPT 19   ASTSGOT 20   ALKPHOSPHAT 53   TBILIRUBIN 0.4   GLUCOSE 150*     Recent Labs     07/02/21  1056 07/02/21  1335   WBC 7.6  --    ASTSGOT  --  20   ALTSGPT  --  19   ALKPHOSPHAT  --  53   TBILIRUBIN  --  0.4     Recent Labs     07/02/21  1056   RBC 4.87   HEMOGLOBIN 15.6   HEMATOCRIT 45.0   PLATELETCT 187   PROTHROMBTM 12.6   APTT 28.5   INR 0.97       Imaging  X-Ray:  I have personally reviewed the images and compared with prior images. and My impression is: Clear lungs    Assessment/Plan      Acute hypoxemic respiratory failure   Intubated 7/2   All of the appropriate ventilator bundles are in place   Continue vent support   SAT/SBT   Extubate, good Lord willing    Seizures   History of seizures following TBI in 2006   Continue valproic acid, 500 mg twice daily   Continue Keppra, 1000 mg twice daily   Continue Topamax, 50 mg twice daily   Seizure precautions    S/P fall off ladder   Thank God, no significant injuries    History of TBI in 2006    History of hydrocephalus   S/P  shunt placement    History of dementia    Hypokalemia   Replete potassium    Hypocalcemia   Replete calcium    Hypophosphatemia   Replete phosphorus    Hypomagnesemia   Replete magnesium      VTE:  Contraindicated  Ulcer: H2 Antagonist  Lines: Conley Catheter  Ongoing indication addressed    I have performed a physical exam and reviewed and updated ROS and Plan today (7/3/2021). In review of yesterday's note (7/2/2021), there are no changes except as documented above.     I have assessed and reassessed his respiratory status with spontaneous breathing trials and ventilator adjustments, airway mechanics, ventilator waveforms, blood pressure,  hemodynamics, cardiovascular status as well as his neurologic status.  He is at increased risk for worsening CNS system dysfunction.    Discussed patient condition and risk of morbidity and/or mortality with RN, RT, Pharmacy, [facility] resident, Charge nurse / hot rounds and QA team     The patient remains critically ill.  Critical care time = 35 minutes in directly providing and coordinating critical care and extensive data review.  No time overlap and excludes procedures.    A Critical Care Medicine progress note may have been authored by a resident physician or advanced practitioner of nursing under my direct supervision on this date of service.  As the supervising and attending physician, I have either attested to or cosigned that document.  IN THE EVENT THAT DISCREPANCIES EXIST BETWEEN THIS DOCUMENT AND ANY DOCUMENT THAT I HAVE ATTESTED TO OR COSIGNED ON THIS DATE OF SERVICE, THEN THIS DOCUMENT REMAINS THE FINAL AUTHORITY AS TO MY ASSESSMENT AND PLAN REGARDING THE CARE OF THIS PATIENT.    Weston Gross MD  Pulmonary and Critical Care Medicine

## 2021-07-03 NOTE — CARE PLAN
Problem: Knowledge Deficit - Standard  Goal: Patient and family/care givers will demonstrate understanding of plan of care, disease process/condition, diagnostic tests and medications  Outcome: Progressing     Problem: Skin Integrity  Goal: Skin integrity is maintained or improved  Outcome: Progressing     Problem: Fall Risk  Goal: Patient will remain free from falls  Outcome: Progressing   The patient is Watcher - Medium risk of patient condition declining or worsening    Shift Goals  Clinical Goals: Improved neuro, prevent seizure, wean ventilator  Patient Goals: [place]  Family Goals: [place]    Progress made toward(s) clinical / shift goals:      Patient is not progressing towards the following goals:

## 2021-07-03 NOTE — PROGRESS NOTES
[facility] ICU Progress Note      Admit Date: 7/2/2021    Resident(s): Ray Warner D.O.   Attending:  CARSON DURBIN/ Dr. Victoria    Patient ID:    Name:  Eriberto Young     YOB: 1955  Age:  66 y.o.  male   MRN:  [id number]    Hospital Course (carried forward and updated):  Eriberto Young is a 66 y.o. male PMHx TBI c/b seizure disorder with  shunt in place who presents after LOC following 10 ft fall from ladder with seizure-like activity noted in ambulance. Patient intubated for airway protection and admitted to ICU.    Consultants:  Critical Care    Interval Events:    -Overnight: No acute events  -Neuro: Following commands this morning, non-agitated. After extubation ~7:45 had seizure-like activity <5 mins that abated after 4 mg ativan. Patient somnolent afterwards, woke up with agitation requiring low titration of precedex. Per wife, very sensitive to ativan and takes >24 hrs to recover  -Tele: 55-70  -SBP: 110-170  -Pulm: Vent day 2, clear CXR and ABG WNL. Extubated at ~7:00  -: Conley while concern for further seizures. Net +1.5L. On NS @83 mL/hr  -GI: No tube at this time. Will trial small amount of food with swallow eval later to take topamax, otherwise will need short term tube placement  -H/O: Recent fall, will hold off lovenox at this time and re-assess 7/4    Vitals Range last 24h:  Temp:  [36.6 °C (97.8 °F)-37.2 °C (99 °F)] 36.7 °C (98 °F)  Pulse:  [] 80  Resp:  [13-25] 18  BP: ()/(56-86) 105/58  SpO2:  [91 %-100 %] 100 %      Intake/Output Summary (Last 24 hours) at 7/3/2021 1311  Last data filed at 7/3/2021 0800  Gross per 24 hour   Intake 3305.76 ml   Output 960 ml   Net 2345.76 ml        Review of Systems   Unable to perform ROS: Acuity of condition       PHYSICAL EXAM:  Vitals:    07/03/21 1043 07/03/21 1100 07/03/21 1200 07/03/21 1300   BP:  122/70 140/79 105/58   Pulse: 76 76 (!) 105 80   Resp: 18 20 (!) 23 18   Temp:       TempSrc:       SpO2: 97% 97% 93% 100%  "  Weight:       Height:        Body mass index is 28.63 kg/m².    O2 therapy: Pulse Oximetry: 100 %, O2 (LPM): 4, O2 Delivery Device: Oxymask      Physical Exam  Constitutional:       Comments: Appears nontoxic, confused.   HENT:      Head:      Comments: Left forehead abrasion, right  Shunt     Mouth/Throat:      Mouth: Mucous membranes are moist.      Pharynx: Oropharynx is clear. No oropharyngeal exudate.   Eyes:      Extraocular Movements: Extraocular movements intact.      Pupils: Pupils are equal, round, and reactive to light.   Cardiovascular:      Rate and Rhythm: Normal rate and regular rhythm.      Pulses: Normal pulses.      Heart sounds: No murmur heard.     Pulmonary:      Effort: Pulmonary effort is normal. No respiratory distress.      Breath sounds: Normal breath sounds. No wheezing.   Abdominal:      General: Abdomen is flat. Bowel sounds are normal. There is no distension.      Palpations: Abdomen is soft.      Tenderness: There is no abdominal tenderness.   Musculoskeletal:         General: Normal range of motion.      Cervical back: Normal range of motion. No rigidity.   Skin:     General: Skin is warm.      Capillary Refill: Capillary refill takes less than 2 seconds.      Findings: No erythema or rash.   Neurological:      Comments: Mumbling saying \"I have to go home\", moving all extremities equally. Following basic commands   Psychiatric:      Comments: Clearly confused, mild agitation after precedex         Recent Labs     07/02/21  1249 07/03/21  0253   ISTATAPH 7.359* 7.391*   ISTATAPCO2 41.1* 35.4   ISTATAPO2 150* 76   ISTATATCO2 24 23   [id number] 99 95   ISTATARTHCO3 23.2 21.5   ISTATARTBE -2 -3   ISTATTEMP see below 97.0 F   ISTATFIO2 75 30   ISTATSPEC Arterial Arterial   ISTATAPHTC  --  7.404   [id number]  --  72     Recent Labs     07/02/21  1335 07/03/21  0449   SODIUM 138 143   POTASSIUM 4.5 3.3*   CHLORIDE 104 116*   CO2 24 19*   BUN 8 6*   CREATININE 0.72 0.48*   MAGNESIUM  -- "  1.5   PHOSPHORUS  --  1.9*   CALCIUM 8.9 6.5*     Recent Labs     07/02/21  1335 07/03/21  0449   ALTSGPT 19  --    ASTSGOT 20  --    ALKPHOSPHAT 53  --    TBILIRUBIN 0.4  --    GLUCOSE 150* 102*     Recent Labs     07/02/21  1056 07/03/21  0449   RBC 4.87 4.02*   HEMOGLOBIN 15.6 12.8*   HEMATOCRIT 45.0 38.2*   PLATELETCT 187 139*   PROTHROMBTM 12.6  --    APTT 28.5  --    INR 0.97  --      Recent Labs     07/02/21  1056 07/02/21  1335 07/03/21  0449   WBC 7.6  --  10.3   NEUTSPOLYS  --   --  51.70   LYMPHOCYTES  --   --  32.20   MONOCYTES  --   --  11.30   EOSINOPHILS  --   --  3.70   BASOPHILS  --   --  0.60   ASTSGOT  --  20  --    ALTSGPT  --  19  --    ALKPHOSPHAT  --  53  --    TBILIRUBIN  --  0.4  --        Procedures:  7/2 field intubation    Imaging:  CT-HEAD W/O   Final Result      1.  Hydrocephalus with right frontal ventriculostomy tube in place. Ventricular size is similar to the prior exam.      2.  No acute intracranial hemorrhage.         DX-CHEST-PORTABLE (1 VIEW)   Final Result         1. No significant interval change.      CT-TSPINE W/O PLUS RECONS   Final Result      Degenerative changes of the thoracic spine without acute osseous abnormality.      CT-LSPINE W/O PLUS RECONS   Final Result      1.  No lumbar spine fracture.   2.  Mild lower lumbar spine degenerative changes with associated minimal retrolisthesis at L3-4 and L4-5.      CT-HEAD W/O   Final Result      1.  Hydrocephalus with ventriculostomy in place.   2.  No acute intracranial hemorrhage or territorial infarct.   3.  Chronic sphenoid sinus disease.      CT-MAXILLOFACIAL W/O PLUS RECONS   Final Result      1.  No facial fracture.   2.  Chronic sphenoid sinus disease.      CT-CSPINE WITHOUT PLUS RECONS   Final Result      Degenerative postsurgical changes of the cervical spine without acute osseous abnormality.      CT-CHEST,ABDOMEN,PELVIS WITH   Final Result      1.  No evidence for acute intrathoracic injury.   2.  No evidence for  acute intra-abdominal trauma.         DX-CHEST-LIMITED (1 VIEW)   Final Result      1.  No acute cardiopulmonary disease.   2.  Endotracheal tube terminates below the level of the thoracic inlet.   3.  Enteric feeding tube terminates in the left upper quadrant.      US-ABORTED US PROCEDURE    (Results Pending)       ASSESSEMENT and PLAN:    * Respiratory failure following trauma (HCC)- (present on admission)  Assessment & Plan  Intubated en route for low GCS/airway protection on 7/2  -extubated 7/3    Plan:  -Protecting airway, no need for re-intubation at this time    Acute encephalopathy- (present on admission)  Assessment & Plan  Likely due to post-ictal state again, seemed to be better before seizure this am. Also likely 2/2 high dose of ativan for patient  -CT head this am again negative for acute path    Plan:  -Monitor for sz, keep ativan low dose 1 mg at a time as provoked encephalopathy  -precedex to keep patient from severe agitation causing self harm, titrate down as tolerated  -lovenox tomorrow if stable    Seizure (HCC)- (present on admission)  Assessment & Plan  Hx of on topamax and depakote  -loaded with keppra  -another 7/3, possibly exacerbated by electrolyte abnormalities and decrease in precedex after extubation. Does not appear to have NCSE    Plan:  -May need neurology consultation if re-occurrence of seizures despite use of home meds + keppra and replenisment of electrolytes  -Seizure precautions  -Ativan as needed  -EEG if encephalopathy persists since patient has a hx of nonconvulsive seizures    Dementia (HCC)- (present on admission)  Assessment & Plan  Hx of, without severe deficit, also h/o parkinson's noted with sinemet intolerance unclear if contributing to dementia    Hydrocephalus (HCC)- (present on admission)  Assessment & Plan  Noted on CT head   shunt appears to be functioning    Trauma- (present on admission)  Assessment & Plan  Trauma services performed primary/secondary survery  and no injuries noted  C spine cleared and C collar removed  PT/OT      DISPO: ICU status given high risk of respiratory decompensation and further seizures    CODE STATUS: Full    Quality Measures:  Feeding: NPO, monitoring for need for cortrack  Analgesia: N/A  Sedation: Precedex  Thromboprophylaxis: SCD  Head of bed: >30 degrees  Ulcer prophylaxis: N/A  Glycemic control: N/A  Bowel care: bowel regimen  Indwelling lines: Conley  Deescalation of antibiotics: N/A      Ray Warner D.O.

## 2021-07-03 NOTE — CARE PLAN
Problem: Ventilation  Goal: Ability to achieve and maintain unassisted ventilation or tolerate decreased levels of ventilator support  Description: Document on Vent flowsheet    1.  Support and monitor invasive and noninvasive mechanical ventilation  2.  Monitor ventilator weaning response  3.  Perform ventilator associated pneumonia prevention interventions  4.  Manage ventilation therapy by monitoring diagnostic test results  Outcome: Progressing    Vent Day 2  7.5 25  18 440 +8 30%

## 2021-07-04 LAB
ANION GAP SERPL CALC-SCNC: 7 MMOL/L (ref 7–16)
BASOPHILS # BLD AUTO: 0.6 % (ref 0–1.8)
BASOPHILS # BLD: 0.09 K/UL (ref 0–0.12)
BUN SERPL-MCNC: 4 MG/DL (ref 8–22)
CALCIUM SERPL-MCNC: 5.3 MG/DL (ref 8.5–10.5)
CHLORIDE SERPL-SCNC: 121 MMOL/L (ref 96–112)
CO2 SERPL-SCNC: 14 MMOL/L (ref 20–33)
CREAT SERPL-MCNC: 0.34 MG/DL (ref 0.5–1.4)
EOSINOPHIL # BLD AUTO: 0.25 K/UL (ref 0–0.51)
EOSINOPHIL NFR BLD: 1.7 % (ref 0–6.9)
ERYTHROCYTE [DISTWIDTH] IN BLOOD BY AUTOMATED COUNT: 42.5 FL (ref 35.9–50)
GLUCOSE SERPL-MCNC: 84 MG/DL (ref 65–99)
HCT VFR BLD AUTO: 42.8 % (ref 42–52)
HGB BLD-MCNC: 14.6 G/DL (ref 14–18)
IMM GRANULOCYTES # BLD AUTO: 0.11 K/UL (ref 0–0.11)
IMM GRANULOCYTES NFR BLD AUTO: 0.7 % (ref 0–0.9)
LYMPHOCYTES # BLD AUTO: 2.85 K/UL (ref 1–4.8)
LYMPHOCYTES NFR BLD: 18.8 % (ref 22–41)
MAGNESIUM SERPL-MCNC: 2.2 MG/DL (ref 1.5–2.5)
MCH RBC QN AUTO: 31.9 PG (ref 27–33)
MCHC RBC AUTO-ENTMCNC: 34.1 G/DL (ref 33.7–35.3)
MCV RBC AUTO: 93.7 FL (ref 81.4–97.8)
MONOCYTES # BLD AUTO: 1.55 K/UL (ref 0–0.85)
MONOCYTES NFR BLD AUTO: 10.3 % (ref 0–13.4)
NEUTROPHILS # BLD AUTO: 10.27 K/UL (ref 1.82–7.42)
NEUTROPHILS NFR BLD: 67.9 % (ref 44–72)
NRBC # BLD AUTO: 0 K/UL
NRBC BLD-RTO: 0 /100 WBC
PHOSPHATE SERPL-MCNC: 2.2 MG/DL (ref 2.5–4.5)
PLATELET # BLD AUTO: 151 K/UL (ref 164–446)
PMV BLD AUTO: 10.9 FL (ref 9–12.9)
POTASSIUM SERPL-SCNC: 3.3 MMOL/L (ref 3.6–5.5)
RBC # BLD AUTO: 4.57 M/UL (ref 4.7–6.1)
SODIUM SERPL-SCNC: 142 MMOL/L (ref 135–145)
WBC # BLD AUTO: 15.1 K/UL (ref 4.8–10.8)

## 2021-07-04 PROCEDURE — 700111 HCHG RX REV CODE 636 W/ 250 OVERRIDE (IP): Performed by: INTERNAL MEDICINE

## 2021-07-04 PROCEDURE — 700105 HCHG RX REV CODE 258: Performed by: STUDENT IN AN ORGANIZED HEALTH CARE EDUCATION/TRAINING PROGRAM

## 2021-07-04 PROCEDURE — 700111 HCHG RX REV CODE 636 W/ 250 OVERRIDE (IP): Performed by: STUDENT IN AN ORGANIZED HEALTH CARE EDUCATION/TRAINING PROGRAM

## 2021-07-04 PROCEDURE — 700105 HCHG RX REV CODE 258: Performed by: INTERNAL MEDICINE

## 2021-07-04 PROCEDURE — 770022 HCHG ROOM/CARE - ICU (200)

## 2021-07-04 PROCEDURE — 37799 UNLISTED PX VASCULAR SURGERY: CPT

## 2021-07-04 PROCEDURE — 700101 HCHG RX REV CODE 250: Performed by: HOSPITALIST

## 2021-07-04 PROCEDURE — 80048 BASIC METABOLIC PNL TOTAL CA: CPT

## 2021-07-04 PROCEDURE — 84100 ASSAY OF PHOSPHORUS: CPT

## 2021-07-04 PROCEDURE — 700111 HCHG RX REV CODE 636 W/ 250 OVERRIDE (IP): Performed by: HOSPITALIST

## 2021-07-04 PROCEDURE — 700102 HCHG RX REV CODE 250 W/ 637 OVERRIDE(OP): Performed by: INTERNAL MEDICINE

## 2021-07-04 PROCEDURE — A9270 NON-COVERED ITEM OR SERVICE: HCPCS | Performed by: INTERNAL MEDICINE

## 2021-07-04 PROCEDURE — 85025 COMPLETE CBC W/AUTO DIFF WBC: CPT

## 2021-07-04 PROCEDURE — 99291 CRITICAL CARE FIRST HOUR: CPT | Performed by: INTERNAL MEDICINE

## 2021-07-04 PROCEDURE — 700101 HCHG RX REV CODE 250: Performed by: INTERNAL MEDICINE

## 2021-07-04 PROCEDURE — 83735 ASSAY OF MAGNESIUM: CPT

## 2021-07-04 RX ORDER — BISACODYL 10 MG
10 SUPPOSITORY, RECTAL RECTAL
Status: DISCONTINUED | OUTPATIENT
Start: 2021-07-04 | End: 2021-07-12 | Stop reason: HOSPADM

## 2021-07-04 RX ORDER — POLYETHYLENE GLYCOL 3350 17 G/17G
1 POWDER, FOR SOLUTION ORAL
Status: DISCONTINUED | OUTPATIENT
Start: 2021-07-04 | End: 2021-07-12 | Stop reason: HOSPADM

## 2021-07-04 RX ORDER — DULOXETIN HYDROCHLORIDE 30 MG/1
30 CAPSULE, DELAYED RELEASE ORAL EVERY EVENING
Status: DISCONTINUED | OUTPATIENT
Start: 2021-07-04 | End: 2021-07-12 | Stop reason: HOSPADM

## 2021-07-04 RX ORDER — ACETAMINOPHEN 325 MG/1
650 TABLET ORAL EVERY 6 HOURS PRN
Status: DISCONTINUED | OUTPATIENT
Start: 2021-07-04 | End: 2021-07-12 | Stop reason: HOSPADM

## 2021-07-04 RX ORDER — SODIUM CHLORIDE, SODIUM LACTATE, POTASSIUM CHLORIDE, CALCIUM CHLORIDE 600; 310; 30; 20 MG/100ML; MG/100ML; MG/100ML; MG/100ML
INJECTION, SOLUTION INTRAVENOUS CONTINUOUS
Status: DISCONTINUED | OUTPATIENT
Start: 2021-07-04 | End: 2021-07-05

## 2021-07-04 RX ORDER — TOPIRAMATE 25 MG/1
50 TABLET ORAL 2 TIMES DAILY
Status: DISCONTINUED | OUTPATIENT
Start: 2021-07-04 | End: 2021-07-05

## 2021-07-04 RX ORDER — AMOXICILLIN 250 MG
2 CAPSULE ORAL 2 TIMES DAILY
Status: DISCONTINUED | OUTPATIENT
Start: 2021-07-04 | End: 2021-07-12 | Stop reason: HOSPADM

## 2021-07-04 RX ORDER — ONDANSETRON 4 MG/1
4 TABLET, ORALLY DISINTEGRATING ORAL EVERY 4 HOURS PRN
Status: DISCONTINUED | OUTPATIENT
Start: 2021-07-04 | End: 2021-07-12 | Stop reason: HOSPADM

## 2021-07-04 RX ORDER — HALOPERIDOL 5 MG/ML
1-3 INJECTION INTRAMUSCULAR EVERY 4 HOURS PRN
Status: DISCONTINUED | OUTPATIENT
Start: 2021-07-04 | End: 2021-07-05

## 2021-07-04 RX ADMIN — HALOPERIDOL LACTATE 2 MG: 5 INJECTION, SOLUTION INTRAMUSCULAR at 19:51

## 2021-07-04 RX ADMIN — SODIUM CHLORIDE, POTASSIUM CHLORIDE, SODIUM LACTATE AND CALCIUM CHLORIDE: 600; 310; 30; 20 INJECTION, SOLUTION INTRAVENOUS at 07:54

## 2021-07-04 RX ADMIN — LEVETIRACETAM INJECTION 1000 MG: 10 INJECTION INTRAVENOUS at 06:58

## 2021-07-04 RX ADMIN — DULOXETINE HYDROCHLORIDE 30 MG: 30 CAPSULE, DELAYED RELEASE ORAL at 17:09

## 2021-07-04 RX ADMIN — ENOXAPARIN SODIUM 40 MG: 40 INJECTION SUBCUTANEOUS at 15:43

## 2021-07-04 RX ADMIN — FENTANYL CITRATE 12.5 MCG: 50 INJECTION, SOLUTION INTRAMUSCULAR; INTRAVENOUS at 22:59

## 2021-07-04 RX ADMIN — LABETALOL HYDROCHLORIDE 10 MG: 5 INJECTION, SOLUTION INTRAVENOUS at 21:11

## 2021-07-04 RX ADMIN — SODIUM CHLORIDE, POTASSIUM CHLORIDE, SODIUM LACTATE AND CALCIUM CHLORIDE: 600; 310; 30; 20 INJECTION, SOLUTION INTRAVENOUS at 23:48

## 2021-07-04 RX ADMIN — TOPIRAMATE 50 MG: 25 TABLET, FILM COATED ORAL at 10:00

## 2021-07-04 RX ADMIN — LORAZEPAM 2 MG: 2 INJECTION INTRAMUSCULAR; INTRAVENOUS at 12:52

## 2021-07-04 RX ADMIN — CALCIUM CHLORIDE 1000 MG: 100 INJECTION, SOLUTION INTRAVENOUS at 07:54

## 2021-07-04 RX ADMIN — DEXTROSE MONOHYDRATE 500 MG: 50 INJECTION, SOLUTION INTRAVENOUS at 06:58

## 2021-07-04 RX ADMIN — DEXTROSE MONOHYDRATE 500 MG: 50 INJECTION, SOLUTION INTRAVENOUS at 18:45

## 2021-07-04 RX ADMIN — TOPIRAMATE 50 MG: 25 TABLET, FILM COATED ORAL at 17:09

## 2021-07-04 RX ADMIN — POTASSIUM PHOSPHATE, MONOBASIC AND POTASSIUM PHOSPHATE, DIBASIC 30 MMOL: 224; 236 INJECTION, SOLUTION, CONCENTRATE INTRAVENOUS at 07:54

## 2021-07-04 RX ADMIN — DOCUSATE SODIUM 50 MG AND SENNOSIDES 8.6 MG 2 TABLET: 8.6; 5 TABLET, FILM COATED ORAL at 17:09

## 2021-07-04 RX ADMIN — LEVETIRACETAM INJECTION 1000 MG: 10 INJECTION INTRAVENOUS at 17:09

## 2021-07-04 NOTE — PROGRESS NOTES
CRITICAL CARE MEDICINE ATTENDING PROGRESS NOTE    Date of admission  7/2/2021    Chief Complaint  66 y.o. adult admitted 7/2/2021 with a seizure resulting in a fall off a ladder.  He had a subsequent seizure witnessed by EMS and was intubated for airway protection.  He has a history of seizures following a TBI in 2006, prior  shunt placement for hydrocephalus and dementia.    Hospital Course      7/3 -    vent day 2.  SAT/SBT.  Continue AEDs.  Replete electrolytes.  7/4 -    sedated, but coming out of the fog.  Dexmedetomidine titrating - will titrate off      Interval Problem Update      SB-SR  Became agitated late yesterday afternoon and subsequently received dexmedetomidine  Dexmedetomidine titrated off  Also received 10 mg of Compazine at 1531 hrs.  This morning he is sedated, but arousable  +1343 mL in the last 24  +2778 mL since admit  Replete electrolytes      Review of Systems  Review of Systems   Unable to perform ROS: Acuity of condition       Vital Signs for the last 24 hours  Temp:  [36.6 °C (97.8 °F)-37 °C (98.6 °F)] 36.7 °C (98.1 °F)  Pulse:  [] 53  Resp:  [0-29] 15  BP: (105-149)/(55-86) 114/59  SpO2:  [91 %-100 %] 100 %    Hemodynamic parameters for the last 24 hours       Vent Settings for the last 24 hours  Vent Mode: Spont  PEEP/CPAP: 8  P Support: 5  MAP: 11  Length of Weaning Trial (Hours): 1    Physical Exam  Physical Exam  Constitutional:       Appearance: He is not diaphoretic.   HENT:      Head: Normocephalic.      Right Ear: External ear normal.      Left Ear: External ear normal.      Nose: Nose normal.      Mouth/Throat:      Mouth: Mucous membranes are moist.      Pharynx: No oropharyngeal exudate.   Eyes:      General:         Right eye: No discharge.         Left eye: No discharge.      Pupils: Pupils are equal, round, and reactive to light.   Cardiovascular:      Pulses: Normal pulses.      Heart sounds: No murmur heard.   No friction rub.      Comments: Sinus  rhythm  Pulmonary:      Breath sounds: No wheezing or rales.   Abdominal:      General: There is no distension.      Tenderness: There is no abdominal tenderness. There is no rebound.   Musculoskeletal:         General: Normal range of motion.      Cervical back: Normal range of motion and neck supple. No rigidity.      Right lower leg: No edema.      Left lower leg: No edema.      Comments: No clubbing or cyanosis   Skin:     General: Skin is warm and dry.   Neurological:      Comments: Agitated yesterday, but sedated now.  Arousable.  Moves all 4.         Medications  Current Facility-Administered Medications   Medication Dose Route Frequency Provider Last Rate Last Admin   • dexmedetomidine (PRECEDEX) 400 mcg/100mL NS premix infusion  0.1-1.5 mcg/kg/hr Intravenous Continuous Weston Gross M.D.   Stopped at 07/04/21 0000   • fentaNYL (SUBLIMAZE) injection  mcg   mcg Intravenous Q HOUR PRN Weston Gross M.D.       • valproate (DEPACON) 500 mg in dextrose 5% 50 mL IVPB  500 mg Intravenous Q12HRS Ray A Timmy, D.O.   Stopped at 07/03/21 1810   • DULoxetine (CYMBALTA) capsule 30 mg  30 mg Enteral Tube Q EVENING Ray A Timmy, D.O.       • Respiratory Therapy Consult   Nebulization Continuous RT Angelique Orr M.D.       • senna-docusate (PERICOLACE or SENOKOT S) 8.6-50 MG per tablet 2 tablet  2 tablet Enteral Tube BID Angelique Orr M.D.   2 tablet at 07/03/21 0510    And   • polyethylene glycol/lytes (MIRALAX) PACKET 1 Packet  1 Packet Enteral Tube QDAY PRN Angelique Orr M.D.        And   • magnesium hydroxide (MILK OF MAGNESIA) suspension 30 mL  30 mL Enteral Tube QDAY PRN Angelique Orr M.D.        And   • bisacodyl (DULCOLAX) suppository 10 mg  10 mg Rectal QDAY PRN Angelique Orr M.D.       • MD Alert...ICU Electrolyte Replacement per Pharmacy   Other PHARMACY TO DOSE Angelique Orr M.D.       • NS infusion   Intravenous Continuous Angelique Orr M.D.  83 mL/hr at 07/02/21 1357 New Bag at 07/02/21 1357   • LORazepam (ATIVAN) injection 2-4 mg  2-4 mg Intravenous Q5 MIN PRN Angelique Orr M.D.   4 mg at 07/03/21 0747   • ondansetron (ZOFRAN) syringe/vial injection 4 mg  4 mg Intravenous Q4HRS PRN Pal Blackmon M.D.       • promethazine (PHENERGAN) suppository 12.5-25 mg  12.5-25 mg Rectal Q4HRS PRN Pal Blackmon M.D.       • prochlorperazine (COMPAZINE) injection 5-10 mg  5-10 mg Intravenous Q4HRS PRN Pal Blackmon M.D.   10 mg at 07/03/21 1531   • enalaprilat (VASOTEC) injection 1.25 mg  1.25 mg Intravenous Q6HRS PRN Pal Blackmon M.D.       • labetalol (NORMODYNE/TRANDATE) injection 10 mg  10 mg Intravenous Q4HRS PRN Pal Blackmon M.D.       • levETIRAcetam (Keppra) 1000 mg in 100 mL NaCl IV premix  1,000 mg Intravenous Q12HRS Pal Blackmon M.D.   Stopped at 07/03/21 1725   • topiramate (TOPAMAX) tablet 50 mg  50 mg Enteral Tube BID Weston Gross M.D.   50 mg at 07/03/21 1520   • acetaminophen (Tylenol) tablet 650 mg  650 mg Enteral Tube Q6HRS PRN Weston Gross M.D.       • ondansetron (ZOFRAN ODT) dispertab 4 mg  4 mg Enteral Tube Q4HRS PRN Weston Gross M.D.       • promethazine (PHENERGAN) tablet 12.5-25 mg  12.5-25 mg Enteral Tube Q4HRS PRN Weston Gross M.D.           Fluids    Intake/Output Summary (Last 24 hours) at 7/4/2021 0327  Last data filed at 7/4/2021 0200  Gross per 24 hour   Intake 3726.74 ml   Output 1915 ml   Net 1811.74 ml       Laboratory  Recent Labs     07/02/21  1249 07/03/21  0253   ISTATAPH 7.359* 7.391*   ISTATAPCO2 41.1* 35.4   ISTATAPO2 150* 76   ISTATATCO2 24 23   [id number] 99 95   ISTATARTHCO3 23.2 21.5   ISTATARTBE -2 -3   ISTATTEMP see below 97.0 F   ISTATFIO2 75 30   ISTATSPEC Arterial Arterial   ISTATAPHTC  --  7.404   [id number]  --  72     Recent Labs     07/02/21  1335 07/03/21  0449   SODIUM 138 143   POTASSIUM 4.5 3.3*   CHLORIDE 104 116*   CO2 24 19*   BUN 8 6*   CREATININE  0.72 0.48*   MAGNESIUM  --  1.5   PHOSPHORUS  --  1.9*   CALCIUM 8.9 6.5*     Recent Labs     07/02/21  1335 07/03/21  0449   ALTSGPT 19  --    ASTSGOT 20  --    ALKPHOSPHAT 53  --    TBILIRUBIN 0.4  --    GLUCOSE 150* 102*     Recent Labs     07/02/21  1056 07/02/21  1335 07/03/21  0449   WBC 7.6  --  10.3   NEUTSPOLYS  --   --  51.70   LYMPHOCYTES  --   --  32.20   MONOCYTES  --   --  11.30   EOSINOPHILS  --   --  3.70   BASOPHILS  --   --  0.60   ASTSGOT  --  20  --    ALTSGPT  --  19  --    ALKPHOSPHAT  --  53  --    TBILIRUBIN  --  0.4  --      Recent Labs     07/02/21  1056 07/03/21  0449   RBC 4.87 4.02*   HEMOGLOBIN 15.6 12.8*   HEMATOCRIT 45.0 38.2*   PLATELETCT 187 139*   PROTHROMBTM 12.6  --    APTT 28.5  --    INR 0.97  --        Imaging  None    Assessment/Plan      Acute hypoxemic respiratory failure   Intubated 7/2-7/3   Improving    Seizures   History of seizures following TBI in 2006   Continue valproic acid, 500 mg twice daily   Continue Keppra, 1000 mg twice daily   Continue Topamax, 50 mg twice daily   Seizure precautions    S/P fall off ladder   Thank God, no significant injuries       History of TBI in 2006    History of hydrocephalus   S/P  shunt placement    History of dementia   Titrate dexmedetomidine off   Limit sedatives and mind altering medications   Frequent reorientation   Try low-dose haloperidol for agitation    Hypokalemia   Replete potassium    Hypocalcemia   Replete calcium    Hypophosphatemia   Replete phosphorus      VTE:  Lovenox  Ulcer: H2 Antagonist  Lines: Conley Catheter  Ongoing indication addressed    I have performed a physical exam and reviewed and updated ROS and Plan today (7/4/2021). In review of yesterday's note (7/3/2021), there are no changes except as documented above.     I have assessed and reassessed his respiratory status, hemodynamics, blood pressure, cardiovascular status and neurologic status with titration of dexmedetomidine.  He is at increased risk  for worsening CNS system dysfunction.    Discussed patient condition and risk of morbidity and/or mortality with RN, RT, Pharmacy, [facility] resident, Charge nurse / hot rounds and QA team     The patient remains critically ill.  Critical care time = 32 minutes in directly providing and coordinating critical care and extensive data review.  No time overlap and excludes procedures.    A Critical Care Medicine progress note may have been authored by a resident physician or advanced practitioner of nursing under my direct supervision on this date of service.  As the supervising and attending physician, I have either attested to or cosigned that document.  IN THE EVENT THAT DISCREPANCIES EXIST BETWEEN THIS DOCUMENT AND ANY DOCUMENT THAT I HAVE ATTESTED TO OR COSIGNED ON THIS DATE OF SERVICE, THEN THIS DOCUMENT REMAINS THE FINAL AUTHORITY AS TO MY ASSESSMENT AND PLAN REGARDING THE CARE OF THIS PATIENT.    Weston Gross MD  Pulmonary and Critical Care Medicine

## 2021-07-04 NOTE — PROGRESS NOTES
[facility] ICU Progress Note      Admit Date: 7/2/2021    Resident(s): Tadeo Bronson M.D.   Attending:  CARSON DURBIN/ Dr. Bronson    Patient ID:    Name:  Eriberto Young     YOB: 1955  Age:  66 y.o.  male   MRN:  [id number]    Hospital Course (carried forward and updated):  Eriberto Young is a 66 y.o. male with past medical history significant for seizure disorder after a traumatic brain injury in 2006 that also resulted in hydrocephalus requiring a  shunt who was brought in as a trauma red after falling off of a ladder of approximately 10 feet that was witnessed by neighbors.  Apparently the patient had loss of consciousness but then regained and in his mental status improved to a GCS of 14 when EMS arrived.  The patient was being transported to [facility] when he had a tonic-clonic seizure and then a GCS of 6.  The patient was then intubated for airway protection.     Consultants:  Critical Care      Interval Events:  -No events overnight.   -Around 12.45pm today 7/4 had a seizure like activity, promptly administered one dose of Ativan 2mg IVP, abated seizure like activity. Episode lasted 2-3 mins and required only one dose of Ativan. Pt had very similar episode 7/3 around afternoon, late given ativan 4mg ivp  -pt is sedated but arousable  -receiving Keppra   -otherwise HD stable.         Vitals Range last 24h:  Temp:  [36.6 °C (97.9 °F)-37 °C (98.6 °F)] 36.7 °C (98.1 °F)  Pulse:  [53-96] 73  Resp:  [0-48] 19  BP: ()/(55-86) 99/57  SpO2:  [93 %-100 %] 100 %      Intake/Output Summary (Last 24 hours) at 7/4/2021 1428  Last data filed at 7/4/2021 0600  Gross per 24 hour   Intake 710.32 ml   Output 1525 ml   Net -814.68 ml           Review of Systems   Unable to perform ROS: Acuity of condition       PHYSICAL EXAM:  Vitals:    07/04/21 1100 07/04/21 1200 07/04/21 1300 07/04/21 1400   BP: 132/68 127/68  (!) 99/57   Pulse: 68 82 96 73   Resp: 15 18 (!) 27 19   Temp:       TempSrc:        SpO2: 94% 98% 97% 100%   Weight:       Height:        Body mass index is 28.63 kg/m².    O2 therapy: Pulse Oximetry: 100 %, O2 (LPM): 4, O2 Delivery Device: Silicone Nasal Cannula        PHYSICAL EXAM:     Constitutional:       Comments: Appears nontoxic, sedated, follow basic commands  HENT:      Head: Normocephalic     Mouth/Throat:      Mouth: Mucous membranes are moist.      Pharynx: Oropharynx is clear. No oropharyngeal exudate.   Eyes:      Extraocular Movements: Extraocular movements intact.      Pupils: Pupils are equal, round, and reactive to light.   Cardiovascular:      Rate and Rhythm: Normal rate and regular rhythm.      Pulses: Normal pulses.      Heart sounds: No murmur heard.     Pulmonary:      Effort: Pulmonary effort is normal. No respiratory distress.      Breath sounds: Normal breath sounds. No wheezing.   Abdominal:      General: Abdomen is flat. Bowel sounds are normal. There is no distension.      Palpations: Abdomen is soft.      Tenderness: There is no abdominal tenderness.   Musculoskeletal:         General: Normal range of motion.      Cervical back: Normal range of motion. No rigidity.   Skin:     General: Skin is warm.      Capillary Refill: Capillary refill takes less than 2 seconds.      Findings: No erythema or rash.   Neurological:      Comments: Sedated but arousable, moving all extremities equally.             Recent Labs     07/02/21  1249 07/03/21  0253   ISTATAPH 7.359* 7.391*   ISTATAPCO2 41.1* 35.4   ISTATAPO2 150* 76   ISTATATCO2 24 23   [id number] 99 95   ISTATARTHCO3 23.2 21.5   ISTATARTBE -2 -3   ISTATTEMP see below 97.0 F   ISTATFIO2 75 30   ISTATSPEC Arterial Arterial   ISTATAPHTC  --  7.404   [id number]  --  72     Recent Labs     07/02/21  1335 07/03/21  0449 07/04/21  0450   SODIUM 138 143 142   POTASSIUM 4.5 3.3* 3.3*   CHLORIDE 104 116* 121*   CO2 24 19* 14*   BUN 8 6* 4*   CREATININE 0.72 0.48* 0.34*   MAGNESIUM  --  1.5 2.2   PHOSPHORUS  --  1.9* 2.2*    CALCIUM 8.9 6.5* 5.3*     Recent Labs     07/02/21  1335 07/03/21  0449 07/04/21  0450   ALTSGPT 19  --   --    ASTSGOT 20  --   --    ALKPHOSPHAT 53  --   --    TBILIRUBIN 0.4  --   --    GLUCOSE 150* 102* 84     Recent Labs     07/02/21  1056 07/03/21  0449 07/04/21  0832   RBC 4.87 4.02* 4.57*   HEMOGLOBIN 15.6 12.8* 14.6   HEMATOCRIT 45.0 38.2* 42.8   PLATELETCT 187 139* 151*   PROTHROMBTM 12.6  --   --    APTT 28.5  --   --    INR 0.97  --   --      Recent Labs     07/02/21  1056 07/02/21  1335 07/03/21  0449 07/04/21  0832   WBC 7.6  --  10.3 15.1*   NEUTSPOLYS  --   --  51.70 67.90   LYMPHOCYTES  --   --  32.20 18.80*   MONOCYTES  --   --  11.30 10.30   EOSINOPHILS  --   --  3.70 1.70   BASOPHILS  --   --  0.60 0.60   ASTSGOT  --  20  --   --    ALTSGPT  --  19  --   --    ALKPHOSPHAT  --  53  --   --    TBILIRUBIN  --  0.4  --   --        Meds:  • haloperidol lactate  1-3 mg     • fentaNYL  12.5-25 mcg     • LR   83 mL/hr at 07/04/21 0754   • enoxaparin (LOVENOX) injection  40 mg     • valproate (DEPACON) IV  500 mg 500 mg (07/04/21 0658)   • DULoxetine  30 mg     • Respiratory Therapy Consult       • senna-docusate  2 tablet      And   • polyethylene glycol/lytes  1 Packet      And   • magnesium hydroxide  30 mL      And   • bisacodyl  10 mg     • MD Alert...Adult ICU Electrolyte Replacement per Pharmacy       • LORazepam  2-4 mg     • ondansetron  4 mg     • enalaprilat  1.25 mg     • labetalol  10 mg     • levETIRAcetam (Keppra) IV  1,000 mg 1,000 mg (07/04/21 0658)   • topiramate  50 mg     • acetaminophen  650 mg     • ondansetron  4 mg          Procedures:    Imaging:  CT-HEAD W/O   Final Result      1.  Hydrocephalus with right frontal ventriculostomy tube in place. Ventricular size is similar to the prior exam.      2.  No acute intracranial hemorrhage.         DX-CHEST-PORTABLE (1 VIEW)   Final Result         1. No significant interval change.      CT-TSPINE W/O PLUS RECONS   Final Result       Degenerative changes of the thoracic spine without acute osseous abnormality.      CT-LSPINE W/O PLUS RECONS   Final Result      1.  No lumbar spine fracture.   2.  Mild lower lumbar spine degenerative changes with associated minimal retrolisthesis at L3-4 and L4-5.      CT-HEAD W/O   Final Result      1.  Hydrocephalus with ventriculostomy in place.   2.  No acute intracranial hemorrhage or territorial infarct.   3.  Chronic sphenoid sinus disease.      CT-MAXILLOFACIAL W/O PLUS RECONS   Final Result      1.  No facial fracture.   2.  Chronic sphenoid sinus disease.      CT-CSPINE WITHOUT PLUS RECONS   Final Result      Degenerative postsurgical changes of the cervical spine without acute osseous abnormality.      CT-CHEST,ABDOMEN,PELVIS WITH   Final Result      1.  No evidence for acute intrathoracic injury.   2.  No evidence for acute intra-abdominal trauma.         DX-CHEST-LIMITED (1 VIEW)   Final Result      1.  No acute cardiopulmonary disease.   2.  Endotracheal tube terminates below the level of the thoracic inlet.   3.  Enteric feeding tube terminates in the left upper quadrant.      US-ABORTED US PROCEDURE    (Results Pending)       ASSESSEMENT and PLAN:    Acute encephalopathy- (present on admission)-Improving  Assessment & Plan  Likely due to post-ictal state, also possible to be likely 2/2 sedating meds  -Monitor for seizurez, keep ativan low dose 1-2 mg at a time as provoked encephalopathy  -Titrate dexmedetomidine off  -limit the use of sedating meds, avoid if possible.   -low dose haloperidol prn for agitation.       Seizure (HCC)- (present on admission)  Assessment & Plan  Hx of on topamax and depakote  -loaded with keppra  -another 7/3,and 7/4  -consider Neuro consult if seizure cont to persist  -Seizure precautions, fall percautions  -Ativan as needed-low dose    Dementia (HCC)- (present on admission)  Assessment & Plan  Hx of, without severe deficit, also h/o parkinson's noted with sinemet  intolerance unclear if contributing to dementia      * Respiratory failure following trauma (HCC)- (present on admission)  Assessment & Plan  Intubated en route for low GCS/airway protection on 7/2  -extubated 7/3, improving      DISPO: ICU candidate for possible respiratory decompensation and further seizures     CODE STATUS: Full     Quality Measures:  Feeding: NPO, monitoring for need for cortrack  Analgesia: N/A  Sedation:na  Thromboprophylaxis: Lovenox  Head of bed: >30 degrees  Ulcer prophylaxis: N/A  Glycemic control: N/A  Bowel care: bowel regimen  Indwelling lines: Conley  Deescalation of antibiotics: N/A      Tadeo Bronson M.D.

## 2021-07-04 NOTE — CARE PLAN
Problem: Knowledge Deficit - Standard  Goal: Patient and family/care givers will demonstrate understanding of plan of care, disease process/condition, diagnostic tests and medications  Outcome: Progressing     Problem: Fall Risk  Goal: Patient will remain free from falls  Outcome: Progressing    Shift Goals  Clinical Goals: Improved neuro, prevent seizure, wean ventilator  Patient Goals: start on PO diet

## 2021-07-05 PROBLEM — E83.42 HYPOMAGNESEMIA: Status: ACTIVE | Noted: 2021-07-05

## 2021-07-05 LAB
ANION GAP SERPL CALC-SCNC: 12 MMOL/L (ref 7–16)
BASOPHILS # BLD AUTO: 0.6 % (ref 0–1.8)
BASOPHILS # BLD: 0.06 K/UL (ref 0–0.12)
BUN SERPL-MCNC: 5 MG/DL (ref 8–22)
CA-I SERPL-SCNC: 1.1 MMOL/L (ref 1.1–1.3)
CALCIUM SERPL-MCNC: 8.7 MG/DL (ref 8.5–10.5)
CHLORIDE SERPL-SCNC: 107 MMOL/L (ref 96–112)
CO2 SERPL-SCNC: 21 MMOL/L (ref 20–33)
CREAT SERPL-MCNC: 0.63 MG/DL (ref 0.5–1.4)
EOSINOPHIL # BLD AUTO: 0.18 K/UL (ref 0–0.51)
EOSINOPHIL NFR BLD: 1.8 % (ref 0–6.9)
ERYTHROCYTE [DISTWIDTH] IN BLOOD BY AUTOMATED COUNT: 41.2 FL (ref 35.9–50)
GLUCOSE SERPL-MCNC: 120 MG/DL (ref 65–99)
HCT VFR BLD AUTO: 42.4 % (ref 42–52)
HGB BLD-MCNC: 14.5 G/DL (ref 14–18)
IMM GRANULOCYTES # BLD AUTO: 0.05 K/UL (ref 0–0.11)
IMM GRANULOCYTES NFR BLD AUTO: 0.5 % (ref 0–0.9)
LYMPHOCYTES # BLD AUTO: 2.47 K/UL (ref 1–4.8)
LYMPHOCYTES NFR BLD: 24.1 % (ref 22–41)
MAGNESIUM SERPL-MCNC: 1.8 MG/DL (ref 1.5–2.5)
MCH RBC QN AUTO: 31.7 PG (ref 27–33)
MCHC RBC AUTO-ENTMCNC: 34.2 G/DL (ref 33.7–35.3)
MCV RBC AUTO: 92.6 FL (ref 81.4–97.8)
MONOCYTES # BLD AUTO: 1.13 K/UL (ref 0–0.85)
MONOCYTES NFR BLD AUTO: 11 % (ref 0–13.4)
NEUTROPHILS # BLD AUTO: 6.36 K/UL (ref 1.82–7.42)
NEUTROPHILS NFR BLD: 62 % (ref 44–72)
NRBC # BLD AUTO: 0 K/UL
NRBC BLD-RTO: 0 /100 WBC
PHOSPHATE SERPL-MCNC: 3.4 MG/DL (ref 2.5–4.5)
PLATELET # BLD AUTO: 172 K/UL (ref 164–446)
PMV BLD AUTO: 10.3 FL (ref 9–12.9)
POTASSIUM SERPL-SCNC: 4 MMOL/L (ref 3.6–5.5)
RBC # BLD AUTO: 4.58 M/UL (ref 4.7–6.1)
SODIUM SERPL-SCNC: 140 MMOL/L (ref 135–145)
WBC # BLD AUTO: 10.3 K/UL (ref 4.8–10.8)

## 2021-07-05 PROCEDURE — 700111 HCHG RX REV CODE 636 W/ 250 OVERRIDE (IP): Performed by: INTERNAL MEDICINE

## 2021-07-05 PROCEDURE — 700111 HCHG RX REV CODE 636 W/ 250 OVERRIDE (IP): Performed by: HOSPITALIST

## 2021-07-05 PROCEDURE — 770006 HCHG ROOM/CARE - MED/SURG/GYN SEMI*

## 2021-07-05 PROCEDURE — 80048 BASIC METABOLIC PNL TOTAL CA: CPT

## 2021-07-05 PROCEDURE — 700111 HCHG RX REV CODE 636 W/ 250 OVERRIDE (IP): Performed by: STUDENT IN AN ORGANIZED HEALTH CARE EDUCATION/TRAINING PROGRAM

## 2021-07-05 PROCEDURE — 97162 PT EVAL MOD COMPLEX 30 MIN: CPT

## 2021-07-05 PROCEDURE — 700102 HCHG RX REV CODE 250 W/ 637 OVERRIDE(OP): Performed by: INTERNAL MEDICINE

## 2021-07-05 PROCEDURE — 700102 HCHG RX REV CODE 250 W/ 637 OVERRIDE(OP): Performed by: STUDENT IN AN ORGANIZED HEALTH CARE EDUCATION/TRAINING PROGRAM

## 2021-07-05 PROCEDURE — 700101 HCHG RX REV CODE 250: Performed by: STUDENT IN AN ORGANIZED HEALTH CARE EDUCATION/TRAINING PROGRAM

## 2021-07-05 PROCEDURE — A9270 NON-COVERED ITEM OR SERVICE: HCPCS | Performed by: INTERNAL MEDICINE

## 2021-07-05 PROCEDURE — 85025 COMPLETE CBC W/AUTO DIFF WBC: CPT

## 2021-07-05 PROCEDURE — 99233 SBSQ HOSP IP/OBS HIGH 50: CPT | Performed by: HOSPITALIST

## 2021-07-05 PROCEDURE — 84100 ASSAY OF PHOSPHORUS: CPT

## 2021-07-05 PROCEDURE — 700105 HCHG RX REV CODE 258: Performed by: STUDENT IN AN ORGANIZED HEALTH CARE EDUCATION/TRAINING PROGRAM

## 2021-07-05 PROCEDURE — 82330 ASSAY OF CALCIUM: CPT

## 2021-07-05 PROCEDURE — 83735 ASSAY OF MAGNESIUM: CPT

## 2021-07-05 PROCEDURE — 99223 1ST HOSP IP/OBS HIGH 75: CPT | Performed by: PSYCHIATRY & NEUROLOGY

## 2021-07-05 PROCEDURE — 302172 SOFT BED BELT: Performed by: INTERNAL MEDICINE

## 2021-07-05 PROCEDURE — A9270 NON-COVERED ITEM OR SERVICE: HCPCS | Performed by: STUDENT IN AN ORGANIZED HEALTH CARE EDUCATION/TRAINING PROGRAM

## 2021-07-05 PROCEDURE — 97166 OT EVAL MOD COMPLEX 45 MIN: CPT

## 2021-07-05 RX ORDER — TOPIRAMATE 25 MG/1
50 TABLET ORAL ONCE
Status: COMPLETED | OUTPATIENT
Start: 2021-07-05 | End: 2021-07-05

## 2021-07-05 RX ORDER — TOPIRAMATE 100 MG/1
100 TABLET, FILM COATED ORAL 2 TIMES DAILY
Status: DISCONTINUED | OUTPATIENT
Start: 2021-07-05 | End: 2021-07-06

## 2021-07-05 RX ORDER — LEVETIRACETAM 5 MG/ML
500 INJECTION INTRAVASCULAR ONCE
Status: COMPLETED | OUTPATIENT
Start: 2021-07-05 | End: 2021-07-05

## 2021-07-05 RX ORDER — MAGNESIUM SULFATE HEPTAHYDRATE 40 MG/ML
2 INJECTION, SOLUTION INTRAVENOUS ONCE
Status: COMPLETED | OUTPATIENT
Start: 2021-07-05 | End: 2021-07-05

## 2021-07-05 RX ORDER — LEVETIRACETAM 15 MG/ML
1500 INJECTION INTRAVASCULAR EVERY 12 HOURS
Status: DISCONTINUED | OUTPATIENT
Start: 2021-07-05 | End: 2021-07-09

## 2021-07-05 RX ADMIN — MAGNESIUM SULFATE 2 G: 2 INJECTION INTRAVENOUS at 09:30

## 2021-07-05 RX ADMIN — DEXTROSE MONOHYDRATE 500 MG: 50 INJECTION, SOLUTION INTRAVENOUS at 18:21

## 2021-07-05 RX ADMIN — DEXTROSE MONOHYDRATE 500 MG: 50 INJECTION, SOLUTION INTRAVENOUS at 05:58

## 2021-07-05 RX ADMIN — ENOXAPARIN SODIUM 40 MG: 40 INJECTION SUBCUTANEOUS at 05:22

## 2021-07-05 RX ADMIN — DULOXETINE HYDROCHLORIDE 30 MG: 30 CAPSULE, DELAYED RELEASE ORAL at 17:15

## 2021-07-05 RX ADMIN — LEVETIRACETAM INJECTION 500 MG: 5 INJECTION INTRAVENOUS at 09:30

## 2021-07-05 RX ADMIN — TOPIRAMATE 50 MG: 25 TABLET, FILM COATED ORAL at 11:09

## 2021-07-05 RX ADMIN — DOCUSATE SODIUM 50 MG AND SENNOSIDES 8.6 MG 2 TABLET: 8.6; 5 TABLET, FILM COATED ORAL at 05:22

## 2021-07-05 RX ADMIN — TOPIRAMATE 50 MG: 25 TABLET, FILM COATED ORAL at 05:22

## 2021-07-05 RX ADMIN — LEVETIRACETAM INJECTION 1000 MG: 10 INJECTION INTRAVENOUS at 05:22

## 2021-07-05 RX ADMIN — TOPIRAMATE 100 MG: 100 TABLET, FILM COATED ORAL at 17:15

## 2021-07-05 RX ADMIN — LEVETIRACETAM 1500 MG: 15 INJECTION INTRAVENOUS at 17:15

## 2021-07-05 RX ADMIN — LORAZEPAM 2 MG: 2 INJECTION INTRAMUSCULAR; INTRAVENOUS at 20:13

## 2021-07-05 RX ADMIN — LORAZEPAM 4 MG: 2 INJECTION INTRAMUSCULAR; INTRAVENOUS at 09:40

## 2021-07-05 RX ADMIN — POLYETHYLENE GLYCOL 3350 1 PACKET: 17 POWDER, FOR SOLUTION ORAL at 05:22

## 2021-07-05 ASSESSMENT — COGNITIVE AND FUNCTIONAL STATUS - GENERAL
MOVING TO AND FROM BED TO CHAIR: UNABLE
SUGGESTED CMS G CODE MODIFIER DAILY ACTIVITY: CL
WALKING IN HOSPITAL ROOM: A LOT
STANDING UP FROM CHAIR USING ARMS: A LOT
DAILY ACTIVITIY SCORE: 13
TOILETING: A LOT
DRESSING REGULAR UPPER BODY CLOTHING: A LOT
SUGGESTED CMS G CODE MODIFIER MOBILITY: CM
MOBILITY SCORE: 8
HELP NEEDED FOR BATHING: A LOT
MOVING FROM LYING ON BACK TO SITTING ON SIDE OF FLAT BED: UNABLE
EATING MEALS: A LITTLE
PERSONAL GROOMING: A LOT
CLIMB 3 TO 5 STEPS WITH RAILING: TOTAL
TURNING FROM BACK TO SIDE WHILE IN FLAT BAD: UNABLE
DRESSING REGULAR LOWER BODY CLOTHING: A LOT

## 2021-07-05 ASSESSMENT — PAIN DESCRIPTION - PAIN TYPE: TYPE: ACUTE PAIN

## 2021-07-05 ASSESSMENT — ACTIVITIES OF DAILY LIVING (ADL): TOILETING: INDEPENDENT

## 2021-07-05 ASSESSMENT — GAIT ASSESSMENTS: GAIT LEVEL OF ASSIST: UNABLE TO PARTICIPATE

## 2021-07-05 NOTE — CONSULTS
Neurology Initial Consult H&P  Neurohospitalist Service, [facility]    Referring Physician: Sergio Pfeiffer M.D.    Chief complaint seizures    HPI: History is obtained from chart only patient unable to provide any meaningful history currently.    History of seizures since status 6 status post TBI with a  shunt.  Per the resident on the case patient has history of seizures occurring at least 2-3 times a month.  All medication was Topamax 50 mg twice daily and Depakote 500 renals twice daily.    Other past history of Parkinson's on Sinemet and dementia on Namenda    He was monitored on the second after falling from a ladder.  Presume he had a seizure.  Had a seizure in the ER.  Was intubated but now extubated.  Was doing well until he had a seizure this morning.    Seizures last between 20 and 30 seconds with generalized tonic-clonic activity in upper extremities.  Postictal for 1 to 2 hours.  Will return back to being awake and alert in between.    We will start on Keppra in ER currently on 1000 twice daily    Depakote level is 52 2 days ago      Review of systems: In addition to what is detailed in the HPI above, (and scanned into the chart if and when applicable), all other systems reviewed and are negative.    Past Medical History:   Seizures status post TBI  FHx:  Unknown patient cannot provide information at this time  SHx:       Allergies:  Allergies   Allergen Reactions   • Ativan Unspecified     Okay with small amount   • Hydromorphone Unspecified     Okay with small amount       Medications:    Current Facility-Administered Medications:   •  magnesium sulfate IVPB premix 2 g, 2 g, Intravenous, Once, Tadeo Bronson M.D.  •  levETIRAcetam (Keppra) 1500 mg in 100 mL NaCl IV premix, 1,500 mg, Intravenous, Q12HRS, Ray Warner D.O.  •  topiramate (TOPAMAX) tablet 100 mg, 100 mg, Oral, BID, Ray Warner D.O.  •  topiramate (TOPAMAX) tablet 50 mg, 50 mg, Oral, Once, Ray WARE  BRADEN Warner  •  levETIRAcetam (Keppra) 500 mg in 100 mL NaCl IV premix, 500 mg, Intravenous, Once, Ray Warner D.O.  •  fentaNYL (SUBLIMAZE) injection 12.5-25 mcg, 12.5-25 mcg, Intravenous, Q4HRS PRN, Weston Gross M.D., 12.5 mcg at 07/04/21 2259  •  enoxaparin (LOVENOX) inj 40 mg, 40 mg, Subcutaneous, DAILY, Weston Gross M.D., 40 mg at 07/05/21 0522  •  DULoxetine (CYMBALTA) capsule 30 mg, 30 mg, Oral, Q EVENING, Weston Gross M.D., 30 mg at 07/04/21 1709  •  senna-docusate (PERICOLACE or SENOKOT S) 8.6-50 MG per tablet 2 tablet, 2 tablet, Oral, BID, 2 tablet at 07/05/21 0522 **AND** polyethylene glycol/lytes (MIRALAX) PACKET 1 Packet, 1 Packet, Oral, QDAY PRN, 1 Packet at 07/05/21 0522 **AND** magnesium hydroxide (MILK OF MAGNESIA) suspension 30 mL, 30 mL, Oral, QDAY PRN **AND** bisacodyl (DULCOLAX) suppository 10 mg, 10 mg, Rectal, QDAY PRN, Weston Gross M.D.  •  acetaminophen (Tylenol) tablet 650 mg, 650 mg, Oral, Q6HRS PRN, Weston Gross M.D.  •  ondansetron (ZOFRAN ODT) dispertab 4 mg, 4 mg, Oral, Q4HRS PRN, Weston Gross M.D.  •  valproate (DEPACON) 500 mg in dextrose 5% 50 mL IVPB, 500 mg, Intravenous, Q12HRS, Ray Warner D.O., Stopped at 07/05/21 0658  •  Respiratory Therapy Consult, , Nebulization, Continuous RT, Angelique Orr M.D.  •  MD Alert...ICU Electrolyte Replacement per Pharmacy, , Other, PHARMACY TO DOSE, Angelique Orr M.D.  •  LORazepam (ATIVAN) injection 2-4 mg, 2-4 mg, Intravenous, Q5 MIN PRN, Angelique Orr M.D., 2 mg at 07/04/21 1252  •  ondansetron (ZOFRAN) syringe/vial injection 4 mg, 4 mg, Intravenous, Q4HRS PRN, Pal Blackmon M.D.  •  enalaprilat (VASOTEC) injection 1.25 mg, 1.25 mg, Intravenous, Q6HRS PRN, Pal Blackmon M.D.  •  labetalol (NORMODYNE/TRANDATE) injection 10 mg, 10 mg, Intravenous, Q4HRS PRN, Pal Blackmon M.D., 10 mg at 07/04/21 2111    Physical Examination:     Vitals:    07/05/21 0400  07/05/21 0500 07/05/21 0600 07/05/21 0700   BP: 121/77 (!) 164/68 136/71 128/69   Pulse: 100 (!) 104 73 73   Resp: 20 (!) 48 18 15   Temp: 37.4 °C (99.3 °F)  37.2 °C (99 °F)    TempSrc: Temporal  Temporal    SpO2: 96% 95% 96% 95%   Weight:       Height:           General: Patient is lethargic not answering any questions.    Eyes: No signs of papilledema  CV: RRR    NEUROLOGICAL EXAM:     Mental status: Lethargic thrashing.  Not following commands  Speech and language: Not following not speaking currently   cranial nerve exam: Pupils are equal, round and reactive to light bilaterally. Visual fields are full. Extraocular muscles are intact.  Reacts to sensations to the face.. Face is symmetric.  Appears to startle by sounds.  Cannot test tongue, palate, shoulder shrug due to altered mental status   motor exam: Moving all 4 extremities least antigravity appears to be strong.  Sensory exam: Response appropriate to noxious stimuli in all 4  Deep tendon reflexes:  2+ and symmetric. Toes down-going bilaterally.  Coordination: no ataxia   Gait: deferred due to altered mental status    Objective Data:    Labs:  Lab Results   Component Value Date/Time    PROTHROMBTM 12.6 07/02/2021 10:56 AM    INR 0.97 07/02/2021 10:56 AM      Lab Results   Component Value Date/Time    WBC 10.3 07/05/2021 04:05 AM    RBC 4.58 (L) 07/05/2021 04:05 AM    HEMOGLOBIN 14.5 07/05/2021 04:05 AM    HEMATOCRIT 42.4 07/05/2021 04:05 AM    MCV 92.6 07/05/2021 04:05 AM    MCH 31.7 07/05/2021 04:05 AM    MCHC 34.2 07/05/2021 04:05 AM    MPV 10.3 07/05/2021 04:05 AM    NEUTSPOLYS 62.00 07/05/2021 04:05 AM    LYMPHOCYTES 24.10 07/05/2021 04:05 AM    MONOCYTES 11.00 07/05/2021 04:05 AM    EOSINOPHILS 1.80 07/05/2021 04:05 AM    BASOPHILS 0.60 07/05/2021 04:05 AM      Lab Results   Component Value Date/Time    SODIUM 140 07/05/2021 04:05 AM    POTASSIUM 4.0 07/05/2021 04:05 AM    CHLORIDE 107 07/05/2021 04:05 AM    CO2 21 07/05/2021 04:05 AM    GLUCOSE 120  (H) 07/05/2021 04:05 AM    BUN 5 (L) 07/05/2021 04:05 AM    CREATININE 0.63 07/05/2021 04:05 AM      Lab Results   Component Value Date/Time    TRIGLYCERIDE 203 (H) 07/02/2021 01:35 PM       Lab Results   Component Value Date/Time    ALKPHOSPHAT 53 07/02/2021 01:35 PM    ASTSGOT 20 07/02/2021 01:35 PM    ALTSGPT 19 07/02/2021 01:35 PM    TBILIRUBIN 0.4 07/02/2021 01:35 PM        Imaging/Testing:  CT-HEAD W/O   Final Result      1.  Hydrocephalus with right frontal ventriculostomy tube in place. Ventricular size is similar to the prior exam.      2.  No acute intracranial hemorrhage.         DX-CHEST-PORTABLE (1 VIEW)   Final Result         1. No significant interval change.      CT-TSPINE W/O PLUS RECONS   Final Result      Degenerative changes of the thoracic spine without acute osseous abnormality.      CT-LSPINE W/O PLUS RECONS   Final Result      1.  No lumbar spine fracture.   2.  Mild lower lumbar spine degenerative changes with associated minimal retrolisthesis at L3-4 and L4-5.      CT-HEAD W/O   Final Result      1.  Hydrocephalus with ventriculostomy in place.   2.  No acute intracranial hemorrhage or territorial infarct.   3.  Chronic sphenoid sinus disease.      CT-MAXILLOFACIAL W/O PLUS RECONS   Final Result      1.  No facial fracture.   2.  Chronic sphenoid sinus disease.      CT-CSPINE WITHOUT PLUS RECONS   Final Result      Degenerative postsurgical changes of the cervical spine without acute osseous abnormality.      CT-CHEST,ABDOMEN,PELVIS WITH   Final Result      1.  No evidence for acute intrathoracic injury.   2.  No evidence for acute intra-abdominal trauma.         DX-CHEST-LIMITED (1 VIEW)   Final Result      1.  No acute cardiopulmonary disease.   2.  Endotracheal tube terminates below the level of the thoracic inlet.   3.  Enteric feeding tube terminates in the left upper quadrant.      US-ABORTED US PROCEDURE    (Results Pending)         Assessment and Plan:    66-year-old male presented  after a fall of presumed head of the seizure.  History of seizures since 2006 after a TBI that resulted in  shunt placement on the right side with hydrocephalus.  The hydrocephalus is stable on CT imaging.  Patient has been having daily seizures per the primary team.  Primary team tells me that his baseline having seizures a least a few times a month.  Currently on 3 medications.  Instead of adding a fourth try to optimize to 3 is currently on.      Plan:  1.  Increase to Keppra 1500 mg twice daily  2.  Increase Topamax to 100 mg twice daily  3.  Continue the Depakote 500 mg twice daily  4.  Seizure precautions  5.  Okay to transfer to ICU per neurology standpoint  6.  No need for EEG unless concern for status epilepticus        The evaluation of the patient, and recommended management, was discussed with the resident staff.       This chart was partially generated using voice recognition technology and sound alike word replacement may be present, best efforts were made to make the chart accurate.    Lucio Alex MD  Board Certified Neurology, ABPN  t) [phone]

## 2021-07-05 NOTE — THERAPY
Physical Therapy   Initial Evaluation     Patient Name: Eriberto Young  Age:  66 y.o., Sex:  male  Medical Record #: [id number]  Today's Date: 7/5/2021     Precautions: Fall Risk    Assessment  Patient is a 66 y.o. male admitted following fall from ladder with positive LOC. Pt with seizure upon arrival, was intubated and now extubated 7/3. Pt with more seizure activity during admission. Pt with hx of TBI in 2006, hydrocephalus s/p  shunt, and dementia. Pt seen for PT evaluation at this time. Pt eager and motivated to participate. demonstrates impulsive behavior and requires cues for safety. Pt required min-mod A for mobility, was able to transfer from EOB to chair with HHA and shuffled steps. Pt presents with posterior lean sitting and standing. Pt will benefit from continued acute PT to progress mobility and postacute placement to maximize safety and functional independence prior to return home. Will follow.     Plan  Recommend Physical Therapy 4 times per week until therapy goals are met for the following treatments:  Bed Mobility, Gait Training, Neuro Re-Education / Balance, Self Care/Home Evaluation, Stair Training, Therapeutic Activities and Therapeutic Exercises  DC Equipment Recommendations: Unable to determine at this time  Discharge Recommendations: Recommend post-acute placement for additional physical therapy services prior to discharge home          07/05/21 1409   Prior Living Situation   Prior Services Intermittent Physical Support for ADL Per Family   Housing / Facility 2 Story House   Steps In Home FOS   Bathroom Set up Walk In Shower   Equipment Owned Single Point Cane;Front-Wheel Walker   Lives with -  Spouse   Comments reports lives with wife Kallie. states Kallie works from home and is able to assist as needed. pt may be unreliable historian, conflicting info provided on level of assist needed for ADLs.    Prior Level of Functional Mobility   Bed Mobility Independent   Transfer Status Independent  "  Ambulation Independent   Distance Ambulation (Feet) community distances   Assistive Devices Used None   Stairs Independent   Comments per pt report. states rarely uses a walker, \"especially when I'm on ladders.\" hx dementia.    Cognition    Level of Consciousness Alert   Comments pleasant and eager to participate. is impulsive and requires cues for safety. slightly delayed responses and difficulty fully opening eyes, though very participatory and engaged.    Balance Assessment   Sitting Balance (Static) Fair -   Sitting Balance (Dynamic) Poor +   Standing Balance (Static) Poor   Standing Balance (Dynamic) Poor -   Weight Shift Sitting Fair   Weight Shift Standing Poor   Comments standing with HHA, posterior lean sitting/standing   Gait Analysis   Gait Level Of Assist Unable to Participate   Weight Bearing Status no restrictions   Comments shuffled steps from EOB to chair mod A.    Bed Mobility    Supine to Sit Moderate Assist   Scooting Maximal Assist   Comments cues for sequencing and initiation   Functional Mobility   Sit to Stand Minimal Assist   Bed, Chair, WC Transfer Moderate Assist   Transfer Method Stand Step   Mobility EOB > chair with HHA. posterior lean and assist for weight shifting. decr foot clearance B.    Short Term Goals    Short Term Goal # 1 pt will perform supine <> sit without bed features with SPV in 6 visits to get in/out of bed at home   Short Term Goal # 2 pt will perform all functional xfrs with SPV in 6 visits for improved independence   Short Term Goal # 3 pt will ambulate 150ft with FWW and SPV in 6 visits to access home   Short Term Goal # 4 pt will negotiate FOS with SPV in 6 visits to access home         "

## 2021-07-05 NOTE — PROGRESS NOTES
Hospital Medicine Daily Progress Note    Date of Service  7/5/2021    Chief Complaint  Respiratory failure and Seizure after fall from ladder    Hospital Course  Eriberto Young is a 66 y.o. male with a hx of migraine headaches, dementia, Parkinson, traumatic brain injury 2006 with subsequent complex partial seizures  admitted 7/2/2021 with trauma from fall of a 10 ft ladder and a witnessed seizure after the fall.  He was intubated for airway protection in the field after versed was given.  Trauma surgery evaulated patient and due to no acute trauma needs he was transitioned to medical service    Interval Problem Update  7/5 I was asked to resume care of patient.  As I was walking toward his room the patient was actively having a seizure and I had nursing emergently give the patient 4 mg IV ativan. The patient is unresponsive but protecting his airway.      I have personally seen and examined the patient at bedside. I discussed the plan of care with bedside RN.    Consultants/Specialty  critical care, neurology and trauma surgery    Code Status  Full Code    Disposition  Patient is not medically cleared.   Anticipate discharge to to be determined.  I have placed the appropriate orders for post-discharge needs.    Review of Systems  ROS     Physical Exam  Temp:  [37.2 °C (98.9 °F)-37.4 °C (99.3 °F)] 37.2 °C (99 °F)  Pulse:  [] 74  Resp:  [15-48] 16  BP: ()/() 146/82  SpO2:  [92 %-100 %] 97 %    Physical Exam    Fluids    Intake/Output Summary (Last 24 hours) at 7/5/2021 1336  Last data filed at 7/5/2021 0600  Gross per 24 hour   Intake 2933.8 ml   Output 1625 ml   Net 1308.8 ml       Laboratory  Recent Labs     07/03/21  0449 07/04/21  0832 07/05/21  0405   WBC 10.3 15.1* 10.3   RBC 4.02* 4.57* 4.58*   HEMOGLOBIN 12.8* 14.6 14.5   HEMATOCRIT 38.2* 42.8 42.4   MCV 95.0 93.7 92.6   MCH 31.8 31.9 31.7   MCHC 33.5* 34.1 34.2   RDW 43.3 42.5 41.2   PLATELETCT 139* 151* 172   MPV 10.3 10.9 10.3     Recent  Labs     07/03/21  0449 07/04/21  0450 07/05/21  0405   SODIUM 143 142 140   POTASSIUM 3.3* 3.3* 4.0   CHLORIDE 116* 121* 107   CO2 19* 14* 21   GLUCOSE 102* 84 120*   BUN 6* 4* 5*   CREATININE 0.48* 0.34* 0.63   CALCIUM 6.5* 5.3* 8.7                   Imaging  CT-HEAD W/O   Final Result      1.  Hydrocephalus with right frontal ventriculostomy tube in place. Ventricular size is similar to the prior exam.      2.  No acute intracranial hemorrhage.         DX-CHEST-PORTABLE (1 VIEW)   Final Result         1. No significant interval change.      CT-TSPINE W/O PLUS RECONS   Final Result      Degenerative changes of the thoracic spine without acute osseous abnormality.      CT-LSPINE W/O PLUS RECONS   Final Result      1.  No lumbar spine fracture.   2.  Mild lower lumbar spine degenerative changes with associated minimal retrolisthesis at L3-4 and L4-5.      CT-HEAD W/O   Final Result      1.  Hydrocephalus with ventriculostomy in place.   2.  No acute intracranial hemorrhage or territorial infarct.   3.  Chronic sphenoid sinus disease.      CT-MAXILLOFACIAL W/O PLUS RECONS   Final Result      1.  No facial fracture.   2.  Chronic sphenoid sinus disease.      CT-CSPINE WITHOUT PLUS RECONS   Final Result      Degenerative postsurgical changes of the cervical spine without acute osseous abnormality.      CT-CHEST,ABDOMEN,PELVIS WITH   Final Result      1.  No evidence for acute intrathoracic injury.   2.  No evidence for acute intra-abdominal trauma.         DX-CHEST-LIMITED (1 VIEW)   Final Result      1.  No acute cardiopulmonary disease.   2.  Endotracheal tube terminates below the level of the thoracic inlet.   3.  Enteric feeding tube terminates in the left upper quadrant.      US-ABORTED US PROCEDURE    (Results Pending)        Assessment/Plan  * Seizure (HCC)- (present on admission)  Assessment & Plan  Post traumatic seizure with decreased LOC requiring intubation  Keppra 1500mg IV as of 7/5  Seizure  precautions  EEG will be considered once he is off the IV propofol drip  Continue homeTopamax 50 mg BID and Depakote 500 mg BID  He is followed by Dr. Melissa Bloch, neurology as an outpatient    Respiratory failure following trauma ([facility])- (present on admission)  Assessment & Plan  He was intubated prior to arrival due to decreased level of consciousness  Extubated .  Encourage incentive spirometry and deep breathing efforts  Mobilize    Trauma- (present on admission)  Assessment & Plan  Fall from ladder with head trauma  Extensive trauma imaging in the ER was negative  Dr. Danielle, trauma surgery, was consulted and request medical team to take over care  He has been cleared for removal of the hard collar    Hypomagnesemia  Assessment & Plan  Goal Mg level >2   M.8 and given 2g MgSO4    Obesity (BMI 30.0-34.9)- (present on admission)  Assessment & Plan  Body mass index is 28.63 kg/m².    Parkinson disease ([facility])- (present on admission)  Assessment & Plan  History of  He has not tolerated Sinemet in the past    Dementia ([facility])- (present on admission)  Assessment & Plan  History of  Followed by Dr. Bloch  He is on Namenda      Hydrocephalus ([facility])- (present on admission)  Assessment & Plan  History of  shunt   Neurology consulting       VTE prophylaxis: enoxaparin ppx    I have performed a physical exam and reviewed and updated ROS and Plan today (2021).

## 2021-07-05 NOTE — CARE PLAN
The patient is Watcher - Medium risk of patient condition declining or worsening    Shift Goals  Clinical Goals: Calm, seizure prevention   Patient Goals: foster  Family Goals:  (No family present)    Problem: Skin Integrity  Goal: Skin integrity is maintained or improved  Outcome: Progressing     Problem: Fall Risk  Goal: Patient will remain free from falls  Outcome: Progressing  Bed in low and locked position. Bed alarm on. Side rails up x3. In room close to nurses' station.      Problem: Safety - Medical Restraint  Goal: Remains free of injury from restraints (Restraint for Interference with Medical Device)  Outcome: Progressing     Problem: Knowledge Deficit - Standard  Goal: Patient and family/care givers will demonstrate understanding of plan of care, disease process/condition, diagnostic tests and medications  Outcome: Not Progressing     Problem: Safety - Medical Restraint  Goal: Free from restraint(s) (Restraint for Interference with Medical Device)  Outcome: Not Progressing  Patient requiring frequent reorienting to stay in bed. Patient does not verbalize understanding of safety education. Patient attempted to pull out only PIV. Patient with legs out of bed. Hourly rounding in place. Will continue to reorient and educate about safety.

## 2021-07-06 ENCOUNTER — APPOINTMENT (OUTPATIENT)
Dept: RADIOLOGY | Facility: MEDICAL CENTER | Age: 66
DRG: 880 | End: 2021-07-06
Attending: HOSPITALIST
Payer: COMMERCIAL

## 2021-07-06 ENCOUNTER — APPOINTMENT (OUTPATIENT)
Dept: RADIOLOGY | Facility: MEDICAL CENTER | Age: 66
DRG: 880 | End: 2021-07-06
Attending: PSYCHIATRY & NEUROLOGY
Payer: COMMERCIAL

## 2021-07-06 PROBLEM — M79.89 SWELLING OF LIMB, LEFT: Status: ACTIVE | Noted: 2021-07-06

## 2021-07-06 LAB
ANION GAP SERPL CALC-SCNC: 10 MMOL/L (ref 7–16)
BASOPHILS # BLD AUTO: 0.7 % (ref 0–1.8)
BASOPHILS # BLD: 0.07 K/UL (ref 0–0.12)
BUN SERPL-MCNC: 6 MG/DL (ref 8–22)
CALCIUM SERPL-MCNC: 9 MG/DL (ref 8.5–10.5)
CHLORIDE SERPL-SCNC: 104 MMOL/L (ref 96–112)
CO2 SERPL-SCNC: 22 MMOL/L (ref 20–33)
CREAT SERPL-MCNC: 0.69 MG/DL (ref 0.5–1.4)
EOSINOPHIL # BLD AUTO: 0.3 K/UL (ref 0–0.51)
EOSINOPHIL NFR BLD: 3.2 % (ref 0–6.9)
ERYTHROCYTE [DISTWIDTH] IN BLOOD BY AUTOMATED COUNT: 40.4 FL (ref 35.9–50)
GLUCOSE SERPL-MCNC: 124 MG/DL (ref 65–99)
HCT VFR BLD AUTO: 41.5 % (ref 42–52)
HGB BLD-MCNC: 14.6 G/DL (ref 14–18)
IMM GRANULOCYTES # BLD AUTO: 0.05 K/UL (ref 0–0.11)
IMM GRANULOCYTES NFR BLD AUTO: 0.5 % (ref 0–0.9)
LYMPHOCYTES # BLD AUTO: 2.3 K/UL (ref 1–4.8)
LYMPHOCYTES NFR BLD: 24.3 % (ref 22–41)
MAGNESIUM SERPL-MCNC: 2 MG/DL (ref 1.5–2.5)
MCH RBC QN AUTO: 32.2 PG (ref 27–33)
MCHC RBC AUTO-ENTMCNC: 35.2 G/DL (ref 33.7–35.3)
MCV RBC AUTO: 91.4 FL (ref 81.4–97.8)
MONOCYTES # BLD AUTO: 1.12 K/UL (ref 0–0.85)
MONOCYTES NFR BLD AUTO: 11.8 % (ref 0–13.4)
NEUTROPHILS # BLD AUTO: 5.62 K/UL (ref 1.82–7.42)
NEUTROPHILS NFR BLD: 59.5 % (ref 44–72)
NRBC # BLD AUTO: 0 K/UL
NRBC BLD-RTO: 0 /100 WBC
PHOSPHATE SERPL-MCNC: 2.7 MG/DL (ref 2.5–4.5)
PLATELET # BLD AUTO: 162 K/UL (ref 164–446)
PMV BLD AUTO: 10.3 FL (ref 9–12.9)
POTASSIUM SERPL-SCNC: 3.9 MMOL/L (ref 3.6–5.5)
RBC # BLD AUTO: 4.54 M/UL (ref 4.7–6.1)
SODIUM SERPL-SCNC: 136 MMOL/L (ref 135–145)
TSH SERPL DL<=0.005 MIU/L-ACNC: 1.63 UIU/ML (ref 0.38–5.33)
WBC # BLD AUTO: 9.5 K/UL (ref 4.8–10.8)

## 2021-07-06 PROCEDURE — 93971 EXTREMITY STUDY: CPT | Mod: 26,LT | Performed by: INTERNAL MEDICINE

## 2021-07-06 PROCEDURE — 700105 HCHG RX REV CODE 258: Performed by: PSYCHIATRY & NEUROLOGY

## 2021-07-06 PROCEDURE — 80048 BASIC METABOLIC PNL TOTAL CA: CPT

## 2021-07-06 PROCEDURE — 99233 SBSQ HOSP IP/OBS HIGH 50: CPT | Performed by: PSYCHIATRY & NEUROLOGY

## 2021-07-06 PROCEDURE — 93971 EXTREMITY STUDY: CPT | Mod: LT

## 2021-07-06 PROCEDURE — 700105 HCHG RX REV CODE 258: Performed by: STUDENT IN AN ORGANIZED HEALTH CARE EDUCATION/TRAINING PROGRAM

## 2021-07-06 PROCEDURE — 700111 HCHG RX REV CODE 636 W/ 250 OVERRIDE (IP): Performed by: PSYCHIATRY & NEUROLOGY

## 2021-07-06 PROCEDURE — 700111 HCHG RX REV CODE 636 W/ 250 OVERRIDE (IP): Performed by: INTERNAL MEDICINE

## 2021-07-06 PROCEDURE — 700111 HCHG RX REV CODE 636 W/ 250 OVERRIDE (IP): Performed by: HOSPITALIST

## 2021-07-06 PROCEDURE — 84443 ASSAY THYROID STIM HORMONE: CPT

## 2021-07-06 PROCEDURE — 700111 HCHG RX REV CODE 636 W/ 250 OVERRIDE (IP): Performed by: STUDENT IN AN ORGANIZED HEALTH CARE EDUCATION/TRAINING PROGRAM

## 2021-07-06 PROCEDURE — 84100 ASSAY OF PHOSPHORUS: CPT

## 2021-07-06 PROCEDURE — 70450 CT HEAD/BRAIN W/O DYE: CPT

## 2021-07-06 PROCEDURE — C9254 INJECTION, LACOSAMIDE: HCPCS | Performed by: PSYCHIATRY & NEUROLOGY

## 2021-07-06 PROCEDURE — 700101 HCHG RX REV CODE 250: Performed by: STUDENT IN AN ORGANIZED HEALTH CARE EDUCATION/TRAINING PROGRAM

## 2021-07-06 PROCEDURE — 99233 SBSQ HOSP IP/OBS HIGH 50: CPT | Performed by: HOSPITALIST

## 2021-07-06 PROCEDURE — 36415 COLL VENOUS BLD VENIPUNCTURE: CPT

## 2021-07-06 PROCEDURE — 83735 ASSAY OF MAGNESIUM: CPT

## 2021-07-06 PROCEDURE — 85025 COMPLETE CBC W/AUTO DIFF WBC: CPT

## 2021-07-06 PROCEDURE — 770006 HCHG ROOM/CARE - MED/SURG/GYN SEMI*

## 2021-07-06 RX ORDER — LORAZEPAM 2 MG/ML
2 INJECTION INTRAMUSCULAR ONCE
Status: ACTIVE | OUTPATIENT
Start: 2021-07-06 | End: 2021-07-07

## 2021-07-06 RX ORDER — KETOROLAC TROMETHAMINE 30 MG/ML
30 INJECTION, SOLUTION INTRAMUSCULAR; INTRAVENOUS EVERY 6 HOURS PRN
Status: DISPENSED | OUTPATIENT
Start: 2021-07-06 | End: 2021-07-11

## 2021-07-06 RX ADMIN — DEXTROSE MONOHYDRATE 500 MG: 50 INJECTION, SOLUTION INTRAVENOUS at 20:24

## 2021-07-06 RX ADMIN — ENOXAPARIN SODIUM 40 MG: 40 INJECTION SUBCUTANEOUS at 05:19

## 2021-07-06 RX ADMIN — DEXTROSE MONOHYDRATE 500 MG: 50 INJECTION, SOLUTION INTRAVENOUS at 05:52

## 2021-07-06 RX ADMIN — TOPIRAMATE 100 MG: 100 TABLET, FILM COATED ORAL at 05:20

## 2021-07-06 RX ADMIN — ONDANSETRON 4 MG: 2 INJECTION INTRAMUSCULAR; INTRAVENOUS at 22:36

## 2021-07-06 RX ADMIN — SODIUM CHLORIDE 200 MG: 9 INJECTION, SOLUTION INTRAVENOUS at 17:55

## 2021-07-06 RX ADMIN — LEVETIRACETAM 1500 MG: 15 INJECTION INTRAVENOUS at 17:23

## 2021-07-06 RX ADMIN — KETOROLAC TROMETHAMINE 30 MG: 30 INJECTION, SOLUTION INTRAMUSCULAR; INTRAVENOUS at 17:08

## 2021-07-06 RX ADMIN — LEVETIRACETAM 1500 MG: 15 INJECTION INTRAVENOUS at 05:19

## 2021-07-06 RX ADMIN — LORAZEPAM 2 MG: 2 INJECTION INTRAMUSCULAR; INTRAVENOUS at 15:51

## 2021-07-06 RX ADMIN — LORAZEPAM 2 MG: 2 INJECTION INTRAMUSCULAR; INTRAVENOUS at 09:24

## 2021-07-06 RX ADMIN — LORAZEPAM 2 MG: 2 INJECTION INTRAMUSCULAR; INTRAVENOUS at 22:22

## 2021-07-06 RX ADMIN — LORAZEPAM 2 MG: 2 INJECTION INTRAMUSCULAR; INTRAVENOUS at 09:26

## 2021-07-06 ASSESSMENT — COGNITIVE AND FUNCTIONAL STATUS - GENERAL
WALKING IN HOSPITAL ROOM: A LOT
MOVING FROM LYING ON BACK TO SITTING ON SIDE OF FLAT BED: A LITTLE
MOVING TO AND FROM BED TO CHAIR: A LITTLE
DAILY ACTIVITIY SCORE: 12
EATING MEALS: A LOT
STANDING UP FROM CHAIR USING ARMS: A LOT
HELP NEEDED FOR BATHING: A LOT
SUGGESTED CMS G CODE MODIFIER DAILY ACTIVITY: CL
TURNING FROM BACK TO SIDE WHILE IN FLAT BAD: A LITTLE
DRESSING REGULAR LOWER BODY CLOTHING: A LOT
TOILETING: A LOT
SUGGESTED CMS G CODE MODIFIER MOBILITY: CK
PERSONAL GROOMING: A LOT
MOBILITY SCORE: 15
CLIMB 3 TO 5 STEPS WITH RAILING: A LOT
DRESSING REGULAR UPPER BODY CLOTHING: A LOT

## 2021-07-06 ASSESSMENT — PAIN DESCRIPTION - PAIN TYPE
TYPE: ACUTE PAIN
TYPE: CHRONIC PAIN;ACUTE PAIN

## 2021-07-06 NOTE — PROGRESS NOTES
Pt had seizure at 2010, back and forth movement of whole body, non-responsive. Pt given 2 mg of Ativan at 2015, seizure activity ceased 2 minutes later.

## 2021-07-06 NOTE — PROGRESS NOTES
4 Eyes Skin Assessment Completed by LAVONNE Dickson and Yves RN.    Head Scab  Ears WDL  Nose WDL  Mouth WDL  Neck WDL  Breast/Chest WDL    Shoulder Blades WDL  Spine WDL  (R) Arm/Elbow/Hand Edema  (L) Arm/Elbow/Hand Edema  Abdomen WDL  Groin WDL  Scrotum/Coccyx/Buttocks Blanching  (R) Leg Scab and Bruising  (L) Leg WDL   (R) Heel/Foot/Toe WDL  (L) Heel/Foot/Toe WDL          Devices In Places Pulse Ox and Nasal Cannula      Interventions In Place NC W/Ear Foams    Possible Skin Injury No    Pictures Uploaded Into Epic N/A  Wound Consult Placed N/A  RN Wound Prevention Protocol Ordered No

## 2021-07-06 NOTE — PROGRESS NOTES
Patient transported to [facility] via bed with transport tech. All belongings on bed with patient.

## 2021-07-06 NOTE — THERAPY
Missed Therapy     Patient Name: Eriberto Young  Age:  66 y.o., Sex:  male  Medical Record #: [id number]  Today's Date: 7/6/2021    Discussed missed therapy with RN       07/06/21 1609   Treatment Variance   Reason For Missed Therapy Medical - Patient Is Not Medically Stable   Total Time Spent   Total Time Spent (Mins) 3   Interdisciplinary Plan of Care Collaboration   IDT Collaboration with  Nursing   Collaboration Comments Per RN, pt had another witnessed seizure and was provided Ativan so not appropriate for a swallow evaluation at this time. SLP to attempt evaluation tommorrow as appropriate.

## 2021-07-06 NOTE — PROGRESS NOTES
Neurology Progress Note  Neurohospitalist Service, [facility]    Referring Physician: Timothy Fan M.D.    No chief complaint on file.      HPI: Refer to initial documented Neurology H&P, as detailed in the patient's chart.    Interval History July 6, 2021: Patient had seizure last night around 8 PM.  And another one this morning at 9:30 AM.  Typical rocking back-and-forth movement.  Did hit his head this morning.  Getting a CT scan now.    Review of systems: In addition to what is detailed in the HPI and/or updated in the interval history, all other systems reviewed and are negative.    Past Medical History:    has no past medical history on file.    FHx:  family history is not on file.    SHx:       Medications:    Current Facility-Administered Medications:   •  levETIRAcetam (Keppra) 1500 mg in 100 mL NaCl IV premix, 1,500 mg, Intravenous, Q12HRS, Ray Warner, D.O., Stopped at 07/06/21 0534  •  topiramate (TOPAMAX) tablet 100 mg, 100 mg, Oral, BID, Ray Warner, D.O., 100 mg at 07/06/21 0520  •  fentaNYL (SUBLIMAZE) injection 12.5-25 mcg, 12.5-25 mcg, Intravenous, Q4HRS PRN, Weston Gross M.D., 12.5 mcg at 07/04/21 2259  •  enoxaparin (LOVENOX) inj 40 mg, 40 mg, Subcutaneous, DAILY, Weston Gross M.D., 40 mg at 07/06/21 0519  •  DULoxetine (CYMBALTA) capsule 30 mg, 30 mg, Oral, Q EVENING, Weston Gross M.D., 30 mg at 07/05/21 1715  •  senna-docusate (PERICOLACE or SENOKOT S) 8.6-50 MG per tablet 2 tablet, 2 tablet, Oral, BID, 2 tablet at 07/05/21 0522 **AND** polyethylene glycol/lytes (MIRALAX) PACKET 1 Packet, 1 Packet, Oral, QDAY PRN, 1 Packet at 07/05/21 0522 **AND** magnesium hydroxide (MILK OF MAGNESIA) suspension 30 mL, 30 mL, Oral, QDAY PRN **AND** bisacodyl (DULCOLAX) suppository 10 mg, 10 mg, Rectal, QDAY PRN, Weston Gross M.D.  •  acetaminophen (Tylenol) tablet 650 mg, 650 mg, Oral, Q6HRS PRN, Weston Gross M.D.  •   ondansetron (ZOFRAN ODT) dispertab 4 mg, 4 mg, Oral, Q4HRS PRN, Weston Gross M.D.  •  valproate (DEPACON) 500 mg in dextrose 5% 50 mL IVPB, 500 mg, Intravenous, Q12HRS, Ray Warner D.O., Stopped at 07/06/21 0652  •  Respiratory Therapy Consult, , Nebulization, Continuous RT, Angelique Orr M.D.  •  LORazepam (ATIVAN) injection 2-4 mg, 2-4 mg, Intravenous, Q5 MIN PRN, Angelique Orr M.D., 2 mg at 07/06/21 0926  •  ondansetron (ZOFRAN) syringe/vial injection 4 mg, 4 mg, Intravenous, Q4HRS PRN, Pal Blackmon M.D.  •  enalaprilat (VASOTEC) injection 1.25 mg, 1.25 mg, Intravenous, Q6HRS PRN, Pal Blackmon M.D.  •  labetalol (NORMODYNE/TRANDATE) injection 10 mg, 10 mg, Intravenous, Q4HRS PRN, Pal Blackmon M.D., 10 mg at 07/04/21 2111    Physical Examination:     Vitals:    07/06/21 0400 07/06/21 0722 07/06/21 0928 07/06/21 0938   BP: 117/68 136/93 121/77 109/95   Pulse: 83 94 100 (!) 104   Resp: 18 18 16    Temp: 36.8 °C (98.2 °F) 36.7 °C (98 °F)  36.9 °C (98.4 °F)   TempSrc: Temporal Temporal     SpO2: 97% 92% 90% 98%   Weight:       Height:           Patient is currently postictal state.  Confused.  Can Follow commands.  Please moving all 4 extremities.  Pupils are midline equal reactive.  No nystagmus.  All extremities release antigravity.  Appears to be strong.  Response to noxious stimuli.    Objective Data:    Labs:  Lab Results   Component Value Date/Time    PROTHROMBTM 12.6 07/02/2021 10:56 AM    INR 0.97 07/02/2021 10:56 AM      Lab Results   Component Value Date/Time    WBC 9.5 07/06/2021 03:48 AM    RBC 4.54 (L) 07/06/2021 03:48 AM    HEMOGLOBIN 14.6 07/06/2021 03:48 AM    HEMATOCRIT 41.5 (L) 07/06/2021 03:48 AM    MCV 91.4 07/06/2021 03:48 AM    MCH 32.2 07/06/2021 03:48 AM    MCHC 35.2 07/06/2021 03:48 AM    MPV 10.3 07/06/2021 03:48 AM    NEUTSPOLYS 59.50 07/06/2021 03:48 AM    LYMPHOCYTES 24.30 07/06/2021 03:48 AM    MONOCYTES 11.80 07/06/2021 03:48 AM    EOSINOPHILS 3.20  07/06/2021 03:48 AM    BASOPHILS 0.70 07/06/2021 03:48 AM      Lab Results   Component Value Date/Time    SODIUM 136 07/06/2021 03:48 AM    POTASSIUM 3.9 07/06/2021 03:48 AM    CHLORIDE 104 07/06/2021 03:48 AM    CO2 22 07/06/2021 03:48 AM    GLUCOSE 124 (H) 07/06/2021 03:48 AM    BUN 6 (L) 07/06/2021 03:48 AM    CREATININE 0.69 07/06/2021 03:48 AM      Lab Results   Component Value Date/Time    TRIGLYCERIDE 203 (H) 07/02/2021 01:35 PM       Lab Results   Component Value Date/Time    ALKPHOSPHAT 53 07/02/2021 01:35 PM    ASTSGOT 20 07/02/2021 01:35 PM    ALTSGPT 19 07/02/2021 01:35 PM    TBILIRUBIN 0.4 07/02/2021 01:35 PM        Imaging/Testing:  CT-HEAD W/O   Final Result      1.  Hydrocephalus with right frontal ventriculostomy tube in place. Ventricular size is similar to the prior exam.      2.  No acute intracranial hemorrhage.         DX-CHEST-PORTABLE (1 VIEW)   Final Result         1. No significant interval change.      CT-TSPINE W/O PLUS RECONS   Final Result      Degenerative changes of the thoracic spine without acute osseous abnormality.      CT-LSPINE W/O PLUS RECONS   Final Result      1.  No lumbar spine fracture.   2.  Mild lower lumbar spine degenerative changes with associated minimal retrolisthesis at L3-4 and L4-5.      CT-HEAD W/O   Final Result      1.  Hydrocephalus with ventriculostomy in place.   2.  No acute intracranial hemorrhage or territorial infarct.   3.  Chronic sphenoid sinus disease.      CT-MAXILLOFACIAL W/O PLUS RECONS   Final Result      1.  No facial fracture.   2.  Chronic sphenoid sinus disease.      CT-CSPINE WITHOUT PLUS RECONS   Final Result      Degenerative postsurgical changes of the cervical spine without acute osseous abnormality.      CT-CHEST,ABDOMEN,PELVIS WITH   Final Result      1.  No evidence for acute intrathoracic injury.   2.  No evidence for acute intra-abdominal trauma.         DX-CHEST-LIMITED (1 VIEW)   Final Result      1.  No acute cardiopulmonary  disease.   2.  Endotracheal tube terminates below the level of the thoracic inlet.   3.  Enteric feeding tube terminates in the left upper quadrant.      US-ABORTED US PROCEDURE    (Results Pending)   CT-HEAD W/O    (Results Pending)          Assessment and Plan:    66-year-old male past history of TBI with a shunt on the right side back in 2000's seizure disorder since then.  Was admitted few days ago after the seizure falling from a ladder.  Has been having daily seizures while admitted.  Yesterday we increased the Keppra and the Topamax.  His home medications are Depakote and the low-dose Topamax.  Yesterday after the seizure in the morning he was back to his baseline state.  Therefore I have low concern for an ongoing infection or other secondary causes for the increase seizures.  Not sure was causing the increase now.  Looks like his baseline is 1 seizure a week.    Plan:  1.  CT head now  2.  Continue the Depakote 500 mg twice daily  3.  Continue the Keppra 1500 minutes twice daily  4.  We will increase the Topamax to 150 mg twice daily  5.  Seizure precautions  6.  As needed Ativan for seizures lasting more than 5 minutes                The evaluation of the patient, and recommended management, was discussed with the resident staff. I have performed a physical exam and reviewed and updated ROS and Plan today (7/6/2021). In review of yesterday's note (7/5/2021), there are no changes except as documented above.    This chart was partially generated using voice recognition technology and sound alike word replacement may be present, best efforts were made to make the chart accurate.    Lucio Alex MD  Board Certified Neurology, ABPN  t) [phone]

## 2021-07-06 NOTE — PROGRESS NOTES
Hospital Medicine Daily Progress Note    Date of Service  7/6/2021    Chief Complaint  Respiratory failure and Seizure after fall from ladder    Hospital Course  Eriberto Young is a 66 y.o. male with a hx of migraine headaches, dementia, Parkinson, traumatic brain injury 2006 with subsequent complex partial seizures, prior  shunt for hydrocephalus admitted 7/2/2021 with trauma from fall of a 10 ft ladder and a witnessed seizure after the fall.  He was intubated for airway protection in the field. He has continued to have recurrent seizures while hospitalized and neurology was consulted. His AED regimen has been adjusted and Keppra  twice daily added to has been titrated up.    Interval Problem Update  Keppra dose increased by neurology on 7/5 AM  Patient had another seizure episode last night and was given Ativan  Rapid response this AM d/t seizure episode. STAT ct head negative. topamax increased by neurology      I have personally seen and examined the patient at bedside. I discussed the plan of care with bedside RN,  and neurology.    Consultants/Specialty  critical care, neurology and trauma surgery    Code Status  Full Code    Disposition  Patient is not medically cleared.   Anticipate discharge to to be determined.  I have placed the appropriate orders for post-discharge needs.    Review of Systems  Review of Systems   Unable to perform ROS: Mental status change (d/t ativan for seizure episode)        Physical Exam  Temp:  [36.1 °C (96.9 °F)-37.5 °C (99.5 °F)] 36.1 °C (96.9 °F)  Pulse:  [] 75  Resp:  [16-20] 16  BP: (109-138)/(68-95) 138/85  SpO2:  [90 %-98 %] 96 %    Physical Exam  Vitals and nursing note reviewed.   Constitutional:       Appearance: Normal appearance.      Comments: Lethargic d/t ativan   Cardiovascular:      Rate and Rhythm: Normal rate and regular rhythm.      Heart sounds: No murmur heard.     Pulmonary:      Effort: Pulmonary effort is normal. No respiratory distress.       Breath sounds: Normal breath sounds.   Musculoskeletal:      Comments: LUE swelling   Neurological:      Comments: lethargic         Fluids    Intake/Output Summary (Last 24 hours) at 7/6/2021 1445  Last data filed at 7/5/2021 2200  Gross per 24 hour   Intake 1000 ml   Output 750 ml   Net 250 ml       Laboratory  Recent Labs     07/04/21  0832 07/05/21  0405 07/06/21  0348   WBC 15.1* 10.3 9.5   RBC 4.57* 4.58* 4.54*   HEMOGLOBIN 14.6 14.5 14.6   HEMATOCRIT 42.8 42.4 41.5*   MCV 93.7 92.6 91.4   MCH 31.9 31.7 32.2   MCHC 34.1 34.2 35.2   RDW 42.5 41.2 40.4   PLATELETCT 151* 172 162*   MPV 10.9 10.3 10.3     Recent Labs     07/04/21  0450 07/05/21  0405 07/06/21  0348   SODIUM 142 140 136   POTASSIUM 3.3* 4.0 3.9   CHLORIDE 121* 107 104   CO2 14* 21 22   GLUCOSE 84 120* 124*   BUN 4* 5* 6*   CREATININE 0.34* 0.63 0.69   CALCIUM 5.3* 8.7 9.0                   Imaging  CT-HEAD W/O   Final Result         1. Stable course of the right frontal ventriculostomy catheter, with no significant change in symmetric bilateral ventricular dilation.   2. No calvarial fracture or acute intracranial hemorrhage.   3. Chronic right sphenoid sinus disease.      CT-HEAD W/O   Final Result      1.  Hydrocephalus with right frontal ventriculostomy tube in place. Ventricular size is similar to the prior exam.      2.  No acute intracranial hemorrhage.         DX-CHEST-PORTABLE (1 VIEW)   Final Result         1. No significant interval change.      CT-TSPINE W/O PLUS RECONS   Final Result      Degenerative changes of the thoracic spine without acute osseous abnormality.      CT-LSPINE W/O PLUS RECONS   Final Result      1.  No lumbar spine fracture.   2.  Mild lower lumbar spine degenerative changes with associated minimal retrolisthesis at L3-4 and L4-5.      CT-HEAD W/O   Final Result      1.  Hydrocephalus with ventriculostomy in place.   2.  No acute intracranial hemorrhage or territorial infarct.   3.  Chronic sphenoid sinus  disease.      CT-MAXILLOFACIAL W/O PLUS RECONS   Final Result      1.  No facial fracture.   2.  Chronic sphenoid sinus disease.      CT-CSPINE WITHOUT PLUS RECONS   Final Result      Degenerative postsurgical changes of the cervical spine without acute osseous abnormality.      CT-CHEST,ABDOMEN,PELVIS WITH   Final Result      1.  No evidence for acute intrathoracic injury.   2.  No evidence for acute intra-abdominal trauma.         DX-CHEST-LIMITED (1 VIEW)   Final Result      1.  No acute cardiopulmonary disease.   2.  Endotracheal tube terminates below the level of the thoracic inlet.   3.  Enteric feeding tube terminates in the left upper quadrant.      US-ABORTED US PROCEDURE    (Results Pending)   US-EXTREMITY VENOUS UPPER UNILAT LEFT    (Results Pending)        Assessment/Plan  * Seizure ([facility])- (present on admission)  Assessment & Plan  Post traumatic seizure with decreased LOC requiring intubation  Keppra 1500mg IV as of   Seizure precautions, IV ativan 4mg PRN for seizure activity  Topamax increased to 150mg BID by neuro on   Continue home Depakote 500 mg BID  He is followed by Dr. Melissa Bloch, neurology as an outpatient    Swelling of limb, left  Assessment & Plan  Left forearm swelling  DVT us ordered    Hypomagnesemia  Assessment & Plan  Goal Mg level >2   M.8 and given 2g MgSO4    Obesity (BMI 30.0-34.9)- (present on admission)  Assessment & Plan  Body mass index is 28.63 kg/m².    Parkinson disease ([facility])- (present on admission)  Assessment & Plan  History of  He has not tolerated Sinemet in the past    Respiratory failure following trauma ([facility])- (present on admission)  Assessment & Plan  He was intubated prior to arrival due to decreased level of consciousness  Extubated .  Encourage incentive spirometry and deep breathing efforts  Mobilize    Dementia ([facility])- (present on admission)  Assessment & Plan  History of  Followed by Dr. Bloch  He is on Namenda      Hydrocephalus ([facility])-  (present on admission)  Assessment & Plan  History of  shunt 2011  Neurology consulting    Trauma- (present on admission)  Assessment & Plan  Fall from ladder with head trauma  Extensive trauma imaging in the ER was negative  Dr. Danielle, trauma surgery, was consulted and request medical team to take over care  He has been cleared for removal of the hard collar       VTE prophylaxis: enoxaparin ppx    I have performed a physical exam and reviewed and updated ROS and Plan today (7/6/2021).

## 2021-07-06 NOTE — THERAPY
Missed Therapy     Patient Name: Eriberto Young  Age:  66 y.o., Sex:  male  Medical Record #: [id number]  Today's Date: 7/6/2021    Discussed missed therapy with RN       07/06/21 1121   Treatment Variance   Reason For Missed Therapy Medical - Patient not Able To Participate   Total Time Spent   Total Time Spent (Mins) 6   Interdisciplinary Plan of Care Collaboration   IDT Collaboration with  Nursing   Collaboration Comments Attempted to see pt at this time for a clinical swallow evaluation, however, pt unable to maintain appropriate level of wakefulness for session. Will attempt later as pt is appropriate to participate.

## 2021-07-06 NOTE — PROGRESS NOTES
Patient noticed by another nurse to be standing at the edge of bed, and quickly fell to the ground. Patient did hit his head, staff assist initiated. Patient had a lap belt and bed alarms in place which did not sound. No telesitter available upon request this morning. Rapid response initiated after the patient began to have a seizure. 2 doses of ativan given. Patient to CT with RRT. Family and MD aware.    1000: Patient back to the room, restless and continuing to attempt to get out of bed. Lap belt in place, human sitter to come to bedside for safety. Patient denies HA, vitals stable on 2L oxygen.    1015: Attempted to provide water to this patient, immediately coughed/choked after first sip. Made NPO per nursing judgment, ordered SLP. Patient fell asleep and was too lethargic for SLP eval.

## 2021-07-06 NOTE — PROGRESS NOTES
With patient’s permission (if able), completed daily phone call to designated support person, juarez Kallie  Discussed patient condition and plan of care. All questions answered.  Follow up requested: N/A, to come visit today around 1500

## 2021-07-06 NOTE — PROGRESS NOTES
Report called to Jane BANERJEE on neuroscience floor.  Awaiting available transport personal to take Rico to his new room.

## 2021-07-06 NOTE — THERAPY
"Occupational Therapy   Initial Evaluation     Patient Name: Eriberto Young  Age:  66 y.o., Sex:  male  Medical Record #: [id number]  Today's Date: 7/5/2021     Precautions  Precautions: Fall Risk    Assessment  Patient is 66 y.o. male admitted following fall from ladder with positive LOC. Pt with seizure upon arrival, was intubated and now extubated 7/3. Pt with more seizure activity during admission. Pt with hx of TBI in 2006, hydrocephalus s/p  shunt, and dementia. Pt presents to OT eval with impaired arousal, cognitive deficits, L hand pain/swelling/weakness, BUE fine motor deficits, impaired balance, and decreased activity tolerance affecting his independence and safety with ADLs, ADL transfers, and functional mobility. Pt required maxA for LB dressing, modA for seated grooming/hygiene, and modA for chair transfer. Pt with posterior lean in sitting and standing. Pt required verbal cues to initiate, attend, and sequence through ADL tasks, but very pleasant/eager to participate. Pt had eyes mostly closed throughout session with difficulty opening fully despite cues. Pt will benefit from continued acute OT tx to address participation and independence with ADLs, ADL transfers, and mobility.     Plan    Recommend Occupational Therapy 4 times per week until therapy goals are met for the following treatments:  Adaptive Equipment, Manual Therapy Techniques, Neuro Re-Education / Balance, Self Care/Activities of Daily Living, Therapeutic Activities and Therapeutic Exercises.    DC Equipment Recommendations: Unable to determine at this time  Discharge Recommendations: Recommend post-acute placement for additional occupational therapy services prior to discharge home     Subjective    Pt lethargic, but pleasant and eager to participate. Pt with c/o L hand pain and appearance of swelling in dorsum of hand. Pt states, \"Weebles wobble but they don't fall down.\"     Objective       07/05/21 7668   Prior Living Situation   Prior " Services Intermittent Physical Support for ADL Per Family   Housing / Facility 2 Story House   Steps In Home   (FOS to primary bed/bath)   Bathroom Set up Walk In Shower   Equipment Owned Single Point Cane;Front-Wheel Walker   Lives with - Patient's Self Care Capacity Spouse   Comments Pt lives with spouse who works from home and is able to assist if needed. Unclear if pt is reliable historian due to lethargy.   Prior Level of ADL Function   Self Feeding Independent   Grooming / Hygiene Independent   Bathing Independent   Dressing Independent   Toileting Independent   Comments difficulty giving PLOF due to lethargy, it appears pt is mostly able to complete ADLs independently but spouse is able to assist. May have been having increasing difficulty with showering and LB dressing.   Prior Level of IADL Function   Medication Management Requires Assist   Laundry Requires Assist   Kitchen Mobility Requires Assist   Finances Requires Assist   Home Management Requires Assist   Shopping Requires Assist   Prior Level Of Mobility Independent Without Device in Community;Independent Without Device in Home  (uses SPC intermittently)   Driving / Transportation Unable To Determine At This Time   Occupation (Pre-Hospital Vocational) Retired Due To Age   Leisure Interests Unable To Determine At This Time   History of Falls   History of Falls Yes   Date of Last Fall   (reason for admit, fall from ladder)   Cognition    Cognition / Consciousness X   Speech/ Communication Delayed Responses   Level of Consciousness Alert   New Learning Impaired   Attention Impaired   Sequencing Impaired   Initiation Impaired   Comments pleasant, cooperative, eager to participate, impulsive despite being lethargic, difficulty fully opening eyes    Strength Upper Body   Upper Body Strength  X   Comments L  weaker than R, 3+/5 on L hand   Coordination Upper Body   Coordination X   Fine Motor Coordination impaired bilateral fine motor coordination,  however may be attributed more to cognition/lethargy   Balance Assessment   Comments posterior lean in sitting and standing   Bed Mobility    Supine to Sit Moderate Assist   Comments cues for sequencing and initiation   ADL Assessment   Grooming Moderate Assist;Seated  (brushing hair)   Lower Body Dressing Maximal Assist  (socks)   Toileting   (did not attempt BSC transfer)   Modified [place] (mRS)   Modified [place] Score 4   Functional Mobility   Sit to Stand Minimal Assist   Bed, Chair, Wheelchair Transfer Moderate Assist   Transfer Method Stand Step   Mobility EOB, chair transfer   Visual Perception   Visual Perception  X   Comments unable to assess, eyes not fully open througohut session despite being alert and cues to open   Edema / Skin Assessment   Edema / Skin  X   Left Upper Extremity Edema Trace   Comments L hand swelling and edema   Activity Tolerance   Comments limited by balance, lethargy   Patient / Family Goals   Patient / Family Goal #1 To get better   Short Term Goals   Short Term Goal # 1 Pt will complete toilet transfer with Sarah by discharge.   Short Term Goal # 2 Pt will complete seated grooming/hygiene with setup by discharge.   Short Term Goal # 3 Pt will complete LB dressing with Sarah and use of AE as needed by discharge.

## 2021-07-07 LAB
ANION GAP SERPL CALC-SCNC: 10 MMOL/L (ref 7–16)
BASOPHILS # BLD AUTO: 1.1 % (ref 0–1.8)
BASOPHILS # BLD: 0.1 K/UL (ref 0–0.12)
BUN SERPL-MCNC: 8 MG/DL (ref 8–22)
CALCIUM SERPL-MCNC: 9 MG/DL (ref 8.5–10.5)
CHLORIDE SERPL-SCNC: 107 MMOL/L (ref 96–112)
CO2 SERPL-SCNC: 22 MMOL/L (ref 20–33)
CREAT SERPL-MCNC: 0.7 MG/DL (ref 0.5–1.4)
EOSINOPHIL # BLD AUTO: 0.69 K/UL (ref 0–0.51)
EOSINOPHIL NFR BLD: 7.4 % (ref 0–6.9)
ERYTHROCYTE [DISTWIDTH] IN BLOOD BY AUTOMATED COUNT: 40.6 FL (ref 35.9–50)
GLUCOSE BLD-MCNC: 91 MG/DL (ref 65–99)
GLUCOSE SERPL-MCNC: 96 MG/DL (ref 65–99)
HCT VFR BLD AUTO: 40.9 % (ref 42–52)
HGB BLD-MCNC: 14.3 G/DL (ref 14–18)
IMM GRANULOCYTES # BLD AUTO: 0.05 K/UL (ref 0–0.11)
IMM GRANULOCYTES NFR BLD AUTO: 0.5 % (ref 0–0.9)
LYMPHOCYTES # BLD AUTO: 2.9 K/UL (ref 1–4.8)
LYMPHOCYTES NFR BLD: 31.1 % (ref 22–41)
MAGNESIUM SERPL-MCNC: 2 MG/DL (ref 1.5–2.5)
MCH RBC QN AUTO: 32 PG (ref 27–33)
MCHC RBC AUTO-ENTMCNC: 35 G/DL (ref 33.7–35.3)
MCV RBC AUTO: 91.5 FL (ref 81.4–97.8)
MONOCYTES # BLD AUTO: 1.03 K/UL (ref 0–0.85)
MONOCYTES NFR BLD AUTO: 11.1 % (ref 0–13.4)
NEUTROPHILS # BLD AUTO: 4.55 K/UL (ref 1.82–7.42)
NEUTROPHILS NFR BLD: 48.8 % (ref 44–72)
NRBC # BLD AUTO: 0 K/UL
NRBC BLD-RTO: 0 /100 WBC
PHOSPHATE SERPL-MCNC: 3.3 MG/DL (ref 2.5–4.5)
PLATELET # BLD AUTO: 178 K/UL (ref 164–446)
PMV BLD AUTO: 9.8 FL (ref 9–12.9)
POTASSIUM SERPL-SCNC: 4 MMOL/L (ref 3.6–5.5)
RBC # BLD AUTO: 4.47 M/UL (ref 4.7–6.1)
SODIUM SERPL-SCNC: 139 MMOL/L (ref 135–145)
VALPROATE SERPL-MCNC: 55.7 UG/ML (ref 50–100)
WBC # BLD AUTO: 9.3 K/UL (ref 4.8–10.8)

## 2021-07-07 PROCEDURE — 99233 SBSQ HOSP IP/OBS HIGH 50: CPT | Performed by: HOSPITALIST

## 2021-07-07 PROCEDURE — 84100 ASSAY OF PHOSPHORUS: CPT

## 2021-07-07 PROCEDURE — 700111 HCHG RX REV CODE 636 W/ 250 OVERRIDE (IP): Performed by: PSYCHIATRY & NEUROLOGY

## 2021-07-07 PROCEDURE — 83735 ASSAY OF MAGNESIUM: CPT

## 2021-07-07 PROCEDURE — 700111 HCHG RX REV CODE 636 W/ 250 OVERRIDE (IP): Performed by: STUDENT IN AN ORGANIZED HEALTH CARE EDUCATION/TRAINING PROGRAM

## 2021-07-07 PROCEDURE — 700105 HCHG RX REV CODE 258: Performed by: PSYCHIATRY & NEUROLOGY

## 2021-07-07 PROCEDURE — 4A00X4Z MEASUREMENT OF CENTRAL NERVOUS ELECTRICAL ACTIVITY, EXTERNAL APPROACH: ICD-10-PCS | Performed by: STUDENT IN AN ORGANIZED HEALTH CARE EDUCATION/TRAINING PROGRAM

## 2021-07-07 PROCEDURE — C9254 INJECTION, LACOSAMIDE: HCPCS | Performed by: PSYCHIATRY & NEUROLOGY

## 2021-07-07 PROCEDURE — 85025 COMPLETE CBC W/AUTO DIFF WBC: CPT

## 2021-07-07 PROCEDURE — 700111 HCHG RX REV CODE 636 W/ 250 OVERRIDE (IP): Performed by: INTERNAL MEDICINE

## 2021-07-07 PROCEDURE — 82962 GLUCOSE BLOOD TEST: CPT

## 2021-07-07 PROCEDURE — 36415 COLL VENOUS BLD VENIPUNCTURE: CPT

## 2021-07-07 PROCEDURE — 700105 HCHG RX REV CODE 258: Performed by: STUDENT IN AN ORGANIZED HEALTH CARE EDUCATION/TRAINING PROGRAM

## 2021-07-07 PROCEDURE — 95714 VEEG EA 12-26 HR UNMNTR: CPT | Performed by: STUDENT IN AN ORGANIZED HEALTH CARE EDUCATION/TRAINING PROGRAM

## 2021-07-07 PROCEDURE — 95700 EEG CONT REC W/VID EEG TECH: CPT | Performed by: STUDENT IN AN ORGANIZED HEALTH CARE EDUCATION/TRAINING PROGRAM

## 2021-07-07 PROCEDURE — 306565 RIGID MIT RESTRAINT(PAIR): Performed by: HOSPITALIST

## 2021-07-07 PROCEDURE — 99233 SBSQ HOSP IP/OBS HIGH 50: CPT | Mod: 25 | Performed by: PSYCHIATRY & NEUROLOGY

## 2021-07-07 PROCEDURE — 80164 ASSAY DIPROPYLACETIC ACD TOT: CPT

## 2021-07-07 PROCEDURE — 95720 EEG PHY/QHP EA INCR W/VEEG: CPT | Performed by: STUDENT IN AN ORGANIZED HEALTH CARE EDUCATION/TRAINING PROGRAM

## 2021-07-07 PROCEDURE — 770006 HCHG ROOM/CARE - MED/SURG/GYN SEMI*

## 2021-07-07 PROCEDURE — 80048 BASIC METABOLIC PNL TOTAL CA: CPT

## 2021-07-07 RX ADMIN — LORAZEPAM 2 MG: 2 INJECTION INTRAMUSCULAR; INTRAVENOUS at 02:29

## 2021-07-07 RX ADMIN — SODIUM CHLORIDE 200 MG: 9 INJECTION, SOLUTION INTRAVENOUS at 17:54

## 2021-07-07 RX ADMIN — ENOXAPARIN SODIUM 40 MG: 40 INJECTION SUBCUTANEOUS at 04:49

## 2021-07-07 RX ADMIN — LORAZEPAM 2 MG: 2 INJECTION INTRAMUSCULAR; INTRAVENOUS at 02:41

## 2021-07-07 RX ADMIN — DEXTROSE MONOHYDRATE 500 MG: 50 INJECTION, SOLUTION INTRAVENOUS at 04:39

## 2021-07-07 RX ADMIN — LEVETIRACETAM 1500 MG: 15 INJECTION INTRAVENOUS at 06:07

## 2021-07-07 RX ADMIN — SODIUM CHLORIDE 200 MG: 9 INJECTION, SOLUTION INTRAVENOUS at 06:34

## 2021-07-07 RX ADMIN — LEVETIRACETAM 1500 MG: 15 INJECTION INTRAVENOUS at 17:54

## 2021-07-07 RX ADMIN — DEXTROSE MONOHYDRATE 500 MG: 50 INJECTION, SOLUTION INTRAVENOUS at 17:54

## 2021-07-07 ASSESSMENT — PAIN DESCRIPTION - PAIN TYPE: TYPE: ACUTE PAIN

## 2021-07-07 NOTE — THERAPY
Occupational Therapy Contact Note:       07/07/21 1600   Interdisciplinary Plan of Care Collaboration   Collaboration Comments attempted to see pt for OT tx session, Pt on continuous EEG. Will hold and round back as able.     Suzette Phoenix, OTR/L

## 2021-07-07 NOTE — CARE PLAN
Problem: Knowledge Deficit - Standard  Goal: Patient and family/care givers will demonstrate understanding of plan of care, disease process/condition, diagnostic tests and medications  Outcome: Progressing     Problem: Skin Integrity  Goal: Skin integrity is maintained or improved  Outcome: Progressing     Problem: Fall Risk  Goal: Patient will remain free from falls  Outcome: Progressing     Problem: Pain - Standard  Goal: Alleviation of pain or a reduction in pain to the patient’s comfort goal  Outcome: Progressing     Problem: Safety - Medical Restraint  Goal: Remains free of injury from restraints (Restraint for Interference with Medical Device)  Outcome: Met  Goal: Free from restraint(s) (Restraint for Interference with Medical Device)  Outcome: Met   The patient is Watcher - Medium risk of patient condition declining or worsening    Shift Goals  Clinical Goals: seizure control  Patient Goals: foster  Family Goals:  (No family present)    Progress made toward(s) clinical / shift goals:  Pt has a sitter at bedside.

## 2021-07-07 NOTE — CARE PLAN
The patient is Watcher - Medium risk of patient condition declining or worsening    Shift Goals  Clinical Goals: seizure control  Patient Goals: foster  Family Goals:  (No family present)    Progress made toward(s) clinical / shift goals:    Problem: Pain - Standard  Goal: Alleviation of pain or a reduction in pain to the patient’s comfort goal  Outcome: Progressing  Note: Rest, repositioning, and PRN medication provided for pain control.       Patient is not progressing towards the following goals:      Problem: Fall Risk  Goal: Patient will remain free from falls  Outcome: Not Met  Note: Patient had a fall today, 7/6. All precautions were in place, PSA now at the bedside for safety.

## 2021-07-07 NOTE — PROCEDURES
CONTINUOUS VIDEO ELECTROENCEPHALOGRAM REPORT      Referring provider: Dr. Alex    DOS: 7/7/2021 (20 hours and 19 minutes of total recording time).     INDICATION:  Eriberto Young 66 y.o. male presenting with seizure(s)    CURRENT ANTIEPILEPTIC AND/OR SEDATING REGIMEN: keppra, vimpat, depakote    TECHNIQUE: CVEEG was set up by a Neurodiagnostic technologist who performed education to the patient and staff. A minimum of 23 electrodes and 23 channel recording was setup and performed by Neurodiagnostic technologist, in accordance with the international 10-20 system. Impedence, electrode integrity, and technical impressions were documented a minimum of every 2-24 hour period by a Neurodiagnostic Technologist and reviewed by Interpreting Physician. The study was reviewed in bipolar and referential montages. The recording examined the patient in the awake, drowsy, and sleep state(s).     DESCRIPTION OF THE RECORD:  During wakefulness, the background was continuous and showed a 8 Hz posterior dominant rhythm, with a mixture of alpha, theta, delta activity.  There was reactivity to eye closure/opening.  A normal anterior-posterior gradient was noted with faster beta frequencies seen anteriorly.  During drowsiness, theta/delta frequencies were seen.    Sleep was captured and was characterized by diffuse background delta/theta activity with a loss of myogenic artifact.  N2 sleep transients in the form of sleep spindles and vertex waves were seen in the leads over the central regions.     ACTIVATION PROCEDURES:   NA      ICTAL AND INTERICTAL FINDINGS:   Rare left temporal sharp and slow wave discharges    No regional slowing was seen during this routine study.      No clinical events or seizures were reported or recorded during the study.     EKG: sampling of the EKG recording did not demonstrate any abnormalities    EVENTS:  None    INTERPRETATION:  Abnormal video EEG recording in the awake, drowsy, and sleep state(s):  -  Mild to moderate background slowing suggestive of diffuse/multifocal cerebral dysfunction.   - No persistent focal asymmetries seen.  - Rare left temporal sharp and slow wave discharges suggestive of focal cortical irritability  - No definitive seizures. Clinical correlation is recommended.        Bakari Pham MD  Epilepsy and General Neurology  Department of Neurology  Instructor of Clinical Neurology [facility].   Office: [phone]  Fax: [phone]

## 2021-07-07 NOTE — PROGRESS NOTES
Seizure started at 0228. Pt tense with jerking motions Ativan 2mg administered at 0229. Pt kept going in and out of jerking motions. Another dose of Ativan 2mg was administered at 0241. Seizure ended at 0246. Seizure precautions remain in place with sitter at bedside.

## 2021-07-07 NOTE — CARE PLAN
The patient is Watcher - Medium risk of patient condition declining or worsening    Shift Goals  Clinical Goals: Maintain seizure precautions, monitor for seizure activity   Patient Goals: [place]   Family Goals:  (No family present)    Progress made toward(s) clinical / shift goals:  No seizures observed this shift. 24hr EEG remains. Patient subdued, alert to self only. Follows commands after awaken by stimuli. Wife states she thinks he is better than yesterday.     Patient is not progressing towards the following goals: Continues to be confused, not able to participate in care meaningfully.       Problem: Knowledge Deficit - Standard  Goal: Patient and family/care givers will demonstrate understanding of plan of care, disease process/condition, diagnostic tests and medications  Outcome: Not Progressing

## 2021-07-07 NOTE — PROGRESS NOTES
"TRAUMA TERTIARY SURVEY     Mental status adequate for full examination?: No    Spine cleared (radiologically and/or clinically): Yes    PHYSICAL EXAMINATION:  Vitals: /65   Pulse 72   Temp 36.4 °C (97.5 °F) (Temporal)   Resp 16   Ht 1.778 m (5' 10\")   Wt 90.5 kg (199 lb 8.3 oz)   SpO2 94%   BMI 28.63 kg/m²   Constitutional:     General Appearance: appears stated age, lethargic.  HEENT:     No significant external craniofacial trauma. The pupils are equal, round, and reactive to light bilaterally. The extraocular muscles cannot be assessed.. The nares and oropharynx are clear. The midface and jaw are stable. No malocclusion is evident. EEG underway.  Neck:    The cervical spine is supple and non tender. The trachea is midline. No significant abrasions, lacerations, contusions, punctures, or swelling. No crepitance. No jugulovenous distension.  Respiratory:   Inspection: Sonorous respirations   Palpation:  The chest is nontender. The clavicles are non deformed bilaterally..   Auscultation: clear to auscultation.  Cardiovascular:   Auscultation: regular rate and rhythm.   Peripheral Pulses: Normal.   Abdomen:   Abdomen is soft, nontender, without organomegaly or masses.  Genitourinary:   (MALE): normal male external genitalia.  Musculoskeletal:   The pelvis is stable.  No significant angulation, deformity, or soft tissue injury involving the upper and lower extremities. Normal range of motion.   Skin:   The skin is warm and dry.  Neurologic:    [place] Coma Scale (GCS) 10 E3V2M5.  Psychiatric:   The patient is sedated.    IMAGING:  US-EXTREMITY VENOUS UPPER UNILAT LEFT   Final Result      CT-HEAD W/O   Final Result         1. Stable course of the right frontal ventriculostomy catheter, with no significant change in symmetric bilateral ventricular dilation.   2. No calvarial fracture or acute intracranial hemorrhage.   3. Chronic right sphenoid sinus disease.      CT-HEAD W/O   Final Result      1.  " Hydrocephalus with right frontal ventriculostomy tube in place. Ventricular size is similar to the prior exam.      2.  No acute intracranial hemorrhage.         DX-CHEST-PORTABLE (1 VIEW)   Final Result         1. No significant interval change.      CT-TSPINE W/O PLUS RECONS   Final Result      Degenerative changes of the thoracic spine without acute osseous abnormality.      CT-LSPINE W/O PLUS RECONS   Final Result      1.  No lumbar spine fracture.   2.  Mild lower lumbar spine degenerative changes with associated minimal retrolisthesis at L3-4 and L4-5.      CT-HEAD W/O   Final Result      1.  Hydrocephalus with ventriculostomy in place.   2.  No acute intracranial hemorrhage or territorial infarct.   3.  Chronic sphenoid sinus disease.      CT-MAXILLOFACIAL W/O PLUS RECONS   Final Result      1.  No facial fracture.   2.  Chronic sphenoid sinus disease.      CT-CSPINE WITHOUT PLUS RECONS   Final Result      Degenerative postsurgical changes of the cervical spine without acute osseous abnormality.      CT-CHEST,ABDOMEN,PELVIS WITH   Final Result      1.  No evidence for acute intrathoracic injury.   2.  No evidence for acute intra-abdominal trauma.         DX-CHEST-LIMITED (1 VIEW)   Final Result      1.  No acute cardiopulmonary disease.   2.  Endotracheal tube terminates below the level of the thoracic inlet.   3.  Enteric feeding tube terminates in the left upper quadrant.      US-ABORTED US PROCEDURE    (Results Pending)     All current laboratory studies/radiology exams reviewed: Yes    Completed Consultations:  Dr. Orr, critical care  Dr. Blackmon, [facility] medicine  Dr. Alex, neurology     Pending Consultations:  None    Newly Identified Diagnoses and Injuries:  None    TOTAL RAP SCORE:  RAP Score Total: 3      ETOH Screening     Assessment complete date: 7/7/2021 (Admission BA negative, CAGE not completed)      Pt has received ativan for ongoing seizure activity  No further traumatic injuries  identified  Trauma will sign off at this time

## 2021-07-07 NOTE — PROGRESS NOTES
Neurology Progress Note  Neurohospitalist Service, [facility]    Referring Physician: Timothy Fan M.D.    No chief complaint on file.      HPI: Refer to initial documented Neurology H&P, as detailed in the patient's chart.    Interval History July 7, 2021:    Patient and additional 3 seizures since yesterday.  1 after initiation of Vimpat.      Review of systems: In addition to what is detailed in the HPI and/or updated in the interval history, all other systems reviewed and are negative.    Past Medical History:    has no past medical history on file.    FHx:  family history is not on file.    SHx:       Medications:    Current Facility-Administered Medications:   •  [Held by provider] topiramate (TOPAMAX) tablet 150 mg, 150 mg, Oral, BID, Lucio Alex M.D.  •  LORazepam (ATIVAN) injection 2 mg, 2 mg, Intravenous, Once, Timothy Fan M.D.  •  ketorolac (TORADOL) injection 30 mg, 30 mg, Intravenous, Q6HRS PRN, Timothy Fan M.D., 30 mg at 07/06/21 1708  •  lacosamide (VIMPAT) 200 mg in  mL ivpb, 200 mg, Intravenous, Q12HRS, Lucio Alex M.D., Stopped at 07/07/21 0734  •  levETIRAcetam (Keppra) 1500 mg in 100 mL NaCl IV premix, 1,500 mg, Intravenous, Q12HRS, Ray Warner D.O., Stopped at 07/07/21 0622  •  fentaNYL (SUBLIMAZE) injection 12.5-25 mcg, 12.5-25 mcg, Intravenous, Q4HRS PRN, Weston Gross M.D., 12.5 mcg at 07/04/21 2259  •  enoxaparin (LOVENOX) inj 40 mg, 40 mg, Subcutaneous, DAILY, Weston Gross M.D., 40 mg at 07/07/21 8939  •  DULoxetine (CYMBALTA) capsule 30 mg, 30 mg, Oral, Q EVENING, Weston Gross M.D., 30 mg at 07/05/21 1715  •  senna-docusate (PERICOLACE or SENOKOT S) 8.6-50 MG per tablet 2 tablet, 2 tablet, Oral, BID, 2 tablet at 07/05/21 0522 **AND** polyethylene glycol/lytes (MIRALAX) PACKET 1 Packet, 1 Packet, Oral, QDAY PRN, 1 Packet at 07/05/21 0522 **AND** magnesium hydroxide (MILK OF MAGNESIA) suspension 30 mL, 30  mL, Oral, QDAY PRN **AND** bisacodyl (DULCOLAX) suppository 10 mg, 10 mg, Rectal, QDAY PRN, Weston Gross M.D.  •  acetaminophen (Tylenol) tablet 650 mg, 650 mg, Oral, Q6HRS PRN, Weston Gross M.D.  •  ondansetron (ZOFRAN ODT) dispertab 4 mg, 4 mg, Oral, Q4HRS PRN, Weston Gross M.D.  •  valproate (DEPACON) 500 mg in dextrose 5% 50 mL IVPB, 500 mg, Intravenous, Q12HRS, Ray Warner D.O., Stopped at 07/07/21 0539  •  Respiratory Therapy Consult, , Nebulization, Continuous RT, Angelique Orr M.D.  •  LORazepam (ATIVAN) injection 2-4 mg, 2-4 mg, Intravenous, Q5 MIN PRN, Angelique Orr M.D., 2 mg at 07/07/21 0241  •  ondansetron (ZOFRAN) syringe/vial injection 4 mg, 4 mg, Intravenous, Q4HRS PRN, Pal Blackmon M.D., 4 mg at 07/06/21 2236  •  enalaprilat (VASOTEC) injection 1.25 mg, 1.25 mg, Intravenous, Q6HRS PRN, Pal Blackmon M.D.  •  labetalol (NORMODYNE/TRANDATE) injection 10 mg, 10 mg, Intravenous, Q4HRS PRN, Pal Blackmon M.D., 10 mg at 07/04/21 2111    Physical Examination:     Vitals:    07/06/21 1621 07/06/21 2100 07/07/21 0500 07/07/21 0719   BP: 142/83 118/74 118/73 118/65   Pulse: 90 66 70 65   Resp: 16 16 16 16   Temp: 36.8 °C (98.3 °F) 36.2 °C (97.1 °F) 36.6 °C (97.8 °F) 36.4 °C (97.5 °F)   TempSrc: Temporal Temporal Temporal Temporal   SpO2: 98% 95% 96% 100%   Weight:       Height:           Patient is currently lethargic.  Received multiple doses of Ativan overnight.  Arousable awakens to verbal stimuli.  Is able to tell his name.  Tells me has no pain.  Tells me he is tired of all the questions.  Is moving all 4 extremities antigravity at least.  There is no obvious focality.  Pupils are midline equal reactive.  Intact reflexes.  Sensation appears to be intact.    Objective Data:    Labs:  Lab Results   Component Value Date/Time    PROTHROMBTM 12.6 07/02/2021 10:56 AM    INR 0.97 07/02/2021 10:56 AM      Lab Results   Component Value Date/Time    WBC 9.3  07/07/2021 02:55 AM    RBC 4.47 (L) 07/07/2021 02:55 AM    HEMOGLOBIN 14.3 07/07/2021 02:55 AM    HEMATOCRIT 40.9 (L) 07/07/2021 02:55 AM    MCV 91.5 07/07/2021 02:55 AM    MCH 32.0 07/07/2021 02:55 AM    MCHC 35.0 07/07/2021 02:55 AM    MPV 9.8 07/07/2021 02:55 AM    NEUTSPOLYS 48.80 07/07/2021 02:55 AM    LYMPHOCYTES 31.10 07/07/2021 02:55 AM    MONOCYTES 11.10 07/07/2021 02:55 AM    EOSINOPHILS 7.40 (H) 07/07/2021 02:55 AM    BASOPHILS 1.10 07/07/2021 02:55 AM      Lab Results   Component Value Date/Time    SODIUM 139 07/07/2021 02:54 AM    POTASSIUM 4.0 07/07/2021 02:54 AM    CHLORIDE 107 07/07/2021 02:54 AM    CO2 22 07/07/2021 02:54 AM    GLUCOSE 96 07/07/2021 02:54 AM    BUN 8 07/07/2021 02:54 AM    CREATININE 0.70 07/07/2021 02:54 AM      Lab Results   Component Value Date/Time    TRIGLYCERIDE 203 (H) 07/02/2021 01:35 PM       Lab Results   Component Value Date/Time    ALKPHOSPHAT 53 07/02/2021 01:35 PM    ASTSGOT 20 07/02/2021 01:35 PM    ALTSGPT 19 07/02/2021 01:35 PM    TBILIRUBIN 0.4 07/02/2021 01:35 PM        Imaging/Testing:  US-EXTREMITY VENOUS UPPER UNILAT LEFT   Final Result      CT-HEAD W/O   Final Result         1. Stable course of the right frontal ventriculostomy catheter, with no significant change in symmetric bilateral ventricular dilation.   2. No calvarial fracture or acute intracranial hemorrhage.   3. Chronic right sphenoid sinus disease.      CT-HEAD W/O   Final Result      1.  Hydrocephalus with right frontal ventriculostomy tube in place. Ventricular size is similar to the prior exam.      2.  No acute intracranial hemorrhage.         DX-CHEST-PORTABLE (1 VIEW)   Final Result         1. No significant interval change.      CT-TSPINE W/O PLUS RECONS   Final Result      Degenerative changes of the thoracic spine without acute osseous abnormality.      CT-LSPINE W/O PLUS RECONS   Final Result      1.  No lumbar spine fracture.   2.  Mild lower lumbar spine degenerative changes with  associated minimal retrolisthesis at L3-4 and L4-5.      CT-HEAD W/O   Final Result      1.  Hydrocephalus with ventriculostomy in place.   2.  No acute intracranial hemorrhage or territorial infarct.   3.  Chronic sphenoid sinus disease.      CT-MAXILLOFACIAL W/O PLUS RECONS   Final Result      1.  No facial fracture.   2.  Chronic sphenoid sinus disease.      CT-CSPINE WITHOUT PLUS RECONS   Final Result      Degenerative postsurgical changes of the cervical spine without acute osseous abnormality.      CT-CHEST,ABDOMEN,PELVIS WITH   Final Result      1.  No evidence for acute intrathoracic injury.   2.  No evidence for acute intra-abdominal trauma.         DX-CHEST-LIMITED (1 VIEW)   Final Result      1.  No acute cardiopulmonary disease.   2.  Endotracheal tube terminates below the level of the thoracic inlet.   3.  Enteric feeding tube terminates in the left upper quadrant.      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  Was admitted few days ago after the seizure falling from a ladder.  Has been having daily seizures while admitted.  Yesterday we increased the Keppra and the Topamax.  His home medications are Depakote and the low-dose Topamax.  Yesterday after the seizure in the morning he was back to his baseline state.  Therefore I have low concern for an ongoing infection or other secondary causes for the increase seizures.  Not sure was causing the increase now.  Looks like his baseline is 1 seizure a week.    Update 7/7  Patient continues to have multiple seizures despite initiation and escalation of multiple AEDs.  The nurses told me overnight he would do this bilateral arm tremoring.  Sometimes he is responding to them during the events.  He is postictal afterwards but usually gets Ativan for these events also.  Still odd to have this escalation of seizures  with no obvious cause so far and being resistant to multiple medications.  Repeat imaging has been unremarkable.  We may need to consider MRI brain.  And lumbar puncture.  However in the meantime we will get a continuous EEG to confirm seizure and the focus.      Plan:  1.  CT head -stable yesterday  2.  Continue the Depakote 500 mg twice daily  3.  Continue the Keppra 1500 minutes twice daily  4.  Hold Topamax  5.  Seizure precautions  6.  As needed Ativan for seizures lasting more than 5 minutes  7.  Continue Vimpat 200 mg twice daily  8.  Continuous EEG                The evaluation of the patient, and recommended management, was discussed with the resident staff. I have performed a physical exam and reviewed and updated ROS and Plan today (7/7/2021). In review of yesterday's note (7/6/2021), there are no changes except as documented above.    This chart was partially generated using voice recognition technology and sound alike word replacement may be present, best efforts were made to make the chart accurate.    Lucio Alex MD  Board Certified Neurology, ABPN  t) [phone]

## 2021-07-07 NOTE — PROGRESS NOTES
Hospital Medicine Daily Progress Note    Date of Service  7/7/2021    Chief Complaint  Respiratory failure and Seizure after fall from ladder    Hospital Course  Eriberto Young is a 66 y.o. male with a hx of migraine headaches, dementia, Parkinson, traumatic brain injury 2006 with subsequent complex partial seizures, prior  shunt for hydrocephalus admitted 7/2/2021 with trauma from fall of a 10 ft ladder and a witnessed seizure after the fall.  He was intubated for airway protection in the field. He has continued to have recurrent seizures while hospitalized and neurology was consulted. His AED regimen has been adjusted and Keppra  twice daily added to has been titrated up.    Interval Problem Update  2 seizure episodes overnight  Hold topamax per neuro, plan for continuous EEG  Pt lethargic, undergoing EEG    I have personally seen and examined the patient at bedside. I discussed the plan of care with bedside RN,  and neurology.    Consultants/Specialty  critical care, neurology and trauma surgery    Code Status  Full Code    Disposition  Patient is not medically cleared.   Anticipate discharge to to be determined.  I have placed the appropriate orders for post-discharge needs.    Review of Systems  Review of Systems   Unable to perform ROS: Mental status change        Physical Exam  Temp:  [36.2 °C (97.1 °F)-36.8 °C (98.3 °F)] 36.4 °C (97.5 °F)  Pulse:  [65-90] 72  Resp:  [16] 16  BP: (118-142)/(65-83) 118/65  SpO2:  [94 %-100 %] 94 %    Physical Exam  Vitals and nursing note reviewed.   Constitutional:       Appearance: Normal appearance.      Comments: Lethargic   Cardiovascular:      Rate and Rhythm: Normal rate and regular rhythm.      Heart sounds: No murmur heard.     Pulmonary:      Effort: Pulmonary effort is normal. No respiratory distress.      Breath sounds: Normal breath sounds.   Musculoskeletal:      Comments: LUE swelling   Neurological:      Comments: lethargic          Fluids    Intake/Output Summary (Last 24 hours) at 7/7/2021 1322  Last data filed at 7/7/2021 0600  Gross per 24 hour   Intake --   Output 1390 ml   Net -1390 ml       Laboratory  Recent Labs     07/05/21  0405 07/06/21  0348 07/07/21  0255   WBC 10.3 9.5 9.3   RBC 4.58* 4.54* 4.47*   HEMOGLOBIN 14.5 14.6 14.3   HEMATOCRIT 42.4 41.5* 40.9*   MCV 92.6 91.4 91.5   MCH 31.7 32.2 32.0   MCHC 34.2 35.2 35.0   RDW 41.2 40.4 40.6   PLATELETCT 172 162* 178   MPV 10.3 10.3 9.8     Recent Labs     07/05/21  0405 07/06/21  0348 07/07/21  0254   SODIUM 140 136 139   POTASSIUM 4.0 3.9 4.0   CHLORIDE 107 104 107   CO2 21 22 22   GLUCOSE 120* 124* 96   BUN 5* 6* 8   CREATININE 0.63 0.69 0.70   CALCIUM 8.7 9.0 9.0                   Imaging  US-EXTREMITY VENOUS UPPER UNILAT LEFT   Final Result      CT-HEAD W/O   Final Result         1. Stable course of the right frontal ventriculostomy catheter, with no significant change in symmetric bilateral ventricular dilation.   2. No calvarial fracture or acute intracranial hemorrhage.   3. Chronic right sphenoid sinus disease.      CT-HEAD W/O   Final Result      1.  Hydrocephalus with right frontal ventriculostomy tube in place. Ventricular size is similar to the prior exam.      2.  No acute intracranial hemorrhage.         DX-CHEST-PORTABLE (1 VIEW)   Final Result         1. No significant interval change.      CT-TSPINE W/O PLUS RECONS   Final Result      Degenerative changes of the thoracic spine without acute osseous abnormality.      CT-LSPINE W/O PLUS RECONS   Final Result      1.  No lumbar spine fracture.   2.  Mild lower lumbar spine degenerative changes with associated minimal retrolisthesis at L3-4 and L4-5.      CT-HEAD W/O   Final Result      1.  Hydrocephalus with ventriculostomy in place.   2.  No acute intracranial hemorrhage or territorial infarct.   3.  Chronic sphenoid sinus disease.      CT-MAXILLOFACIAL W/O PLUS RECONS   Final Result      1.  No facial  fracture.   2.  Chronic sphenoid sinus disease.      CT-CSPINE WITHOUT PLUS RECONS   Final Result      Degenerative postsurgical changes of the cervical spine without acute osseous abnormality.      CT-CHEST,ABDOMEN,PELVIS WITH   Final Result      1.  No evidence for acute intrathoracic injury.   2.  No evidence for acute intra-abdominal trauma.         DX-CHEST-LIMITED (1 VIEW)   Final Result      1.  No acute cardiopulmonary disease.   2.  Endotracheal tube terminates below the level of the thoracic inlet.   3.  Enteric feeding tube terminates in the left upper quadrant.      US-ABORTED US PROCEDURE    (Results Pending)        Assessment/Plan  * Seizure (H[facility])- (present on admission)  Assessment & Plan  Post traumatic seizure with decreased LOC requiring intubation  Keppra 1500mg IV as of   Seizure precautions, IV ativan 4mg PRN for seizure activity  Topamax increased to 150mg BID by neuro on   Continue home Depakote 500 mg BID  He is followed by Dr. Melissa Bloch, neurology as an outpatient    Swelling of limb, left  Assessment & Plan  Left forearm swelling  DVT us ordered    Hypomagnesemia  Assessment & Plan  Goal Mg level >2   M.8 and given 2g MgSO4    Obesity (BMI 30.0-34.9)- (present on admission)  Assessment & Plan  Body mass index is 28.63 kg/m².    Parkinson disease ([facility])- (present on admission)  Assessment & Plan  History of  He has not tolerated Sinemet in the past    Respiratory failure following trauma ([facility])- (present on admission)  Assessment & Plan  He was intubated prior to arrival due to decreased level of consciousness  Extubated .  Encourage incentive spirometry and deep breathing efforts  Mobilize    Dementia ([facility])- (present on admission)  Assessment & Plan  History of  Followed by Dr. Bloch  He is on Namenda      Hydrocephalus ([facility])- (present on admission)  Assessment & Plan  History of  shunt   Neurology consulting    Trauma- (present on admission)  Assessment & Plan  Fall  from ladder with head trauma  Extensive trauma imaging in the ER was negative  Dr. Danielle, trauma surgery, was consulted and request medical team to take over care  He has been cleared for removal of the hard collar       VTE prophylaxis: enoxaparin ppx    I have performed a physical exam and reviewed and updated ROS and Plan today (7/7/2021).

## 2021-07-07 NOTE — PROGRESS NOTES
Seizure started at 2217. Pt tense with head jerking repeatedly. Ativan 2mg administered. Seizure lasted until 1024. Pupils dilated and fixed at 7mm. Seizure precautions remain in place.

## 2021-07-07 NOTE — PROGRESS NOTES
unable to adequately awaken this AM. Pt on 24 hour seizure monitor. Nurs advised regarding swallow eval on Thurs if pt at the level to participate. Shahab

## 2021-07-08 PROCEDURE — 99232 SBSQ HOSP IP/OBS MODERATE 35: CPT | Performed by: HOSPITALIST

## 2021-07-08 PROCEDURE — C9254 INJECTION, LACOSAMIDE: HCPCS | Performed by: PSYCHIATRY & NEUROLOGY

## 2021-07-08 PROCEDURE — 700105 HCHG RX REV CODE 258: Performed by: STUDENT IN AN ORGANIZED HEALTH CARE EDUCATION/TRAINING PROGRAM

## 2021-07-08 PROCEDURE — 700111 HCHG RX REV CODE 636 W/ 250 OVERRIDE (IP): Performed by: STUDENT IN AN ORGANIZED HEALTH CARE EDUCATION/TRAINING PROGRAM

## 2021-07-08 PROCEDURE — 92610 EVALUATE SWALLOWING FUNCTION: CPT

## 2021-07-08 PROCEDURE — 4A00X4Z MEASUREMENT OF CENTRAL NERVOUS ELECTRICAL ACTIVITY, EXTERNAL APPROACH: ICD-10-PCS | Performed by: STUDENT IN AN ORGANIZED HEALTH CARE EDUCATION/TRAINING PROGRAM

## 2021-07-08 PROCEDURE — 700111 HCHG RX REV CODE 636 W/ 250 OVERRIDE (IP): Performed by: INTERNAL MEDICINE

## 2021-07-08 PROCEDURE — 95720 EEG PHY/QHP EA INCR W/VEEG: CPT | Performed by: STUDENT IN AN ORGANIZED HEALTH CARE EDUCATION/TRAINING PROGRAM

## 2021-07-08 PROCEDURE — 99232 SBSQ HOSP IP/OBS MODERATE 35: CPT | Mod: 25 | Performed by: PSYCHIATRY & NEUROLOGY

## 2021-07-08 PROCEDURE — 700105 HCHG RX REV CODE 258: Performed by: PSYCHIATRY & NEUROLOGY

## 2021-07-08 PROCEDURE — 700111 HCHG RX REV CODE 636 W/ 250 OVERRIDE (IP): Performed by: PSYCHIATRY & NEUROLOGY

## 2021-07-08 PROCEDURE — 700102 HCHG RX REV CODE 250 W/ 637 OVERRIDE(OP): Performed by: INTERNAL MEDICINE

## 2021-07-08 PROCEDURE — 95714 VEEG EA 12-26 HR UNMNTR: CPT | Performed by: STUDENT IN AN ORGANIZED HEALTH CARE EDUCATION/TRAINING PROGRAM

## 2021-07-08 PROCEDURE — 770006 HCHG ROOM/CARE - MED/SURG/GYN SEMI*

## 2021-07-08 PROCEDURE — A9270 NON-COVERED ITEM OR SERVICE: HCPCS | Performed by: INTERNAL MEDICINE

## 2021-07-08 RX ADMIN — DEXTROSE MONOHYDRATE 500 MG: 50 INJECTION, SOLUTION INTRAVENOUS at 19:00

## 2021-07-08 RX ADMIN — SODIUM CHLORIDE 200 MG: 9 INJECTION, SOLUTION INTRAVENOUS at 17:39

## 2021-07-08 RX ADMIN — ACETAMINOPHEN 650 MG: 325 TABLET, FILM COATED ORAL at 14:31

## 2021-07-08 RX ADMIN — ENOXAPARIN SODIUM 40 MG: 40 INJECTION SUBCUTANEOUS at 05:27

## 2021-07-08 RX ADMIN — DEXTROSE MONOHYDRATE 500 MG: 50 INJECTION, SOLUTION INTRAVENOUS at 05:27

## 2021-07-08 RX ADMIN — DULOXETINE HYDROCHLORIDE 30 MG: 30 CAPSULE, DELAYED RELEASE ORAL at 17:39

## 2021-07-08 RX ADMIN — LEVETIRACETAM 1500 MG: 15 INJECTION INTRAVENOUS at 04:54

## 2021-07-08 RX ADMIN — LEVETIRACETAM 1500 MG: 15 INJECTION INTRAVENOUS at 17:39

## 2021-07-08 ASSESSMENT — PAIN DESCRIPTION - PAIN TYPE: TYPE: ACUTE PAIN

## 2021-07-08 NOTE — PROCEDURES
CONTINUOUS VIDEO ELECTROENCEPHALOGRAM REPORT        Referring provider: Dr. Alex     DOS: 7/8/2021 (23 hours and 31 minutes of total recording time).      INDICATION:  Eriberto Young 66 y.o. male presenting with seizure(s)     CURRENT ANTIEPILEPTIC AND/OR SEDATING REGIMEN: keppra, vimpat, depakote     TECHNIQUE: CVEEG was set up by a Neurodiagnostic technologist who performed education to the patient and staff. A minimum of 23 electrodes and 23 channel recording was setup and performed by Neurodiagnostic technologist, in accordance with the international 10-20 system. Impedence, electrode integrity, and technical impressions were documented a minimum of every 2-24 hour period by a Neurodiagnostic Technologist and reviewed by Interpreting Physician. The study was reviewed in bipolar and referential montages. The recording examined the patient in the awake, drowsy, and sleep state(s).      DESCRIPTION OF THE RECORD:  During wakefulness, the background was continuous and showed a 8 Hz posterior dominant rhythm, with a mixture of alpha, theta, delta activity.  There was reactivity to eye closure/opening.  A normal anterior-posterior gradient was noted with faster beta frequencies seen anteriorly.  During drowsiness, theta/delta frequencies were seen.     Sleep was captured and was characterized by diffuse background delta/theta activity with a loss of myogenic artifact.  N2 sleep transients in the form of sleep spindles and vertex waves were seen in the leads over the central regions.      ACTIVATION PROCEDURES:   NA        ICTAL AND INTERICTAL FINDINGS:   1 clinical event captured at 2:42 PM.  Patient is seen having semirhythmic forward and backward head movements that vary and intensity.  He will then goes on to have body shaking movements of varying intensity, stopping and starting again, for approximately 2 to 3 minutes.  The EEG does not show any definitive ictal rhythm.     No regional slowing was seen during  this routine study.       No clinical events or seizures were reported or recorded during the study.      EKG: sampling of the EKG recording did not demonstrate any abnormalities          INTERPRETATION:  Abnormal video EEG recording in the awake, drowsy, and sleep state(s):  - Mild to moderate background slowing suggestive of diffuse/multifocal cerebral dysfunction.   - No persistent focal asymmetries seen.  - 1 non-epileptic clinical event captured at 2:42 PM.  Patient is seen having semirhythmic forward and backward head movements that vary in intensity and frewuency.  He will then goes on to have body shaking movements of varying intensity, stopping and starting again, for approximately 2 to 3 minutes.  The EEG does not show any definitive ictal rhythm. Clinically, the shaking movements captured are not consistent with an epileptic seizure as well.      Findings discussed with Dr. Isai Pham MD  Epilepsy and General Neurology  Department of Neurology  Instructor of Clinical Neurology [facility].   Office: [phone]  Fax: [phone]

## 2021-07-08 NOTE — CARE PLAN
Problem: Skin Integrity  Goal: Skin integrity is maintained or improved  Outcome: Progressing  Note: Pt on q2hr turns, pillows in use for repositioning.      Problem: Safety - Medical Restraint  Goal: Remains free of injury from restraints (Restraint for Interference with Medical Device)  Outcome: Progressing     The patient is Stable - Low risk of patient condition declining or worsening    Shift Goals  Clinical Goals: Monitor for seizure activity  Patient Goals: [place]  Family Goals:  (No family present)    Progress made toward(s) clinical / shift goals: Pt being monitored for seizure activity.

## 2021-07-08 NOTE — PROGRESS NOTES
Hospital Medicine Daily Progress Note    Date of Service  7/8/2021    Chief Complaint  Respiratory failure and Seizure after fall from ladder    Hospital Course  Eriberto Young is a 66 y.o. male with a hx of migraine headaches, dementia, Parkinson, traumatic brain injury 2006 with subsequent complex partial seizures, prior  shunt for hydrocephalus admitted 7/2/2021 with trauma from fall of a 10 ft ladder and a witnessed seizure after the fall.  He was intubated for airway protection in the field. He has continued to have recurrent seizures while hospitalized and neurology was consulted. His AED regimen has been adjusted and Keppra  twice daily added to has been titrated up.    Interval Problem Update  No seizures overnight, and none noted on EEG  Continue EEG for now, no med changes  Pt remains sleepy, opens eyes briefly however not following commands    I have personally seen and examined the patient at bedside. I discussed the plan of care with bedside RN,  and neurology.    Consultants/Specialty  critical care, neurology and trauma surgery    Code Status  Full Code    Disposition  Patient is not medically cleared.   Anticipate discharge to to be determined.  I have placed the appropriate orders for post-discharge needs.    Review of Systems  Review of Systems   Unable to perform ROS: Mental status change        Physical Exam  Temp:  [36.2 °C (97.2 °F)-36.6 °C (97.9 °F)] 36.6 °C (97.9 °F)  Pulse:  [64-85] 75  Resp:  [16-17] 17  BP: (101-134)/(64-86) 134/81  SpO2:  [92 %-99 %] 95 %    Physical Exam  Vitals and nursing note reviewed.   Constitutional:       Appearance: Normal appearance.      Comments: Lethargic   Cardiovascular:      Rate and Rhythm: Normal rate and regular rhythm.      Heart sounds: No murmur heard.     Pulmonary:      Effort: Pulmonary effort is normal. No respiratory distress.      Breath sounds: Normal breath sounds.   Musculoskeletal:      Comments: LUE swelling   Neurological:       Comments: lethargic         Fluids    Intake/Output Summary (Last 24 hours) at 7/8/2021 1300  Last data filed at 7/8/2021 0550  Gross per 24 hour   Intake --   Output 500 ml   Net -500 ml       Laboratory  Recent Labs     07/06/21  0348 07/07/21  0255   WBC 9.5 9.3   RBC 4.54* 4.47*   HEMOGLOBIN 14.6 14.3   HEMATOCRIT 41.5* 40.9*   MCV 91.4 91.5   MCH 32.2 32.0   MCHC 35.2 35.0   RDW 40.4 40.6   PLATELETCT 162* 178   MPV 10.3 9.8     Recent Labs     07/06/21  0348 07/07/21  0254   SODIUM 136 139   POTASSIUM 3.9 4.0   CHLORIDE 104 107   CO2 22 22   GLUCOSE 124* 96   BUN 6* 8   CREATININE 0.69 0.70   CALCIUM 9.0 9.0                   Imaging  US-EXTREMITY VENOUS UPPER UNILAT LEFT   Final Result      CT-HEAD W/O   Final Result         1. Stable course of the right frontal ventriculostomy catheter, with no significant change in symmetric bilateral ventricular dilation.   2. No calvarial fracture or acute intracranial hemorrhage.   3. Chronic right sphenoid sinus disease.      CT-HEAD W/O   Final Result      1.  Hydrocephalus with right frontal ventriculostomy tube in place. Ventricular size is similar to the prior exam.      2.  No acute intracranial hemorrhage.         DX-CHEST-PORTABLE (1 VIEW)   Final Result         1. No significant interval change.      CT-TSPINE W/O PLUS RECONS   Final Result      Degenerative changes of the thoracic spine without acute osseous abnormality.      CT-LSPINE W/O PLUS RECONS   Final Result      1.  No lumbar spine fracture.   2.  Mild lower lumbar spine degenerative changes with associated minimal retrolisthesis at L3-4 and L4-5.      CT-HEAD W/O   Final Result      1.  Hydrocephalus with ventriculostomy in place.   2.  No acute intracranial hemorrhage or territorial infarct.   3.  Chronic sphenoid sinus disease.      CT-MAXILLOFACIAL W/O PLUS RECONS   Final Result      1.  No facial fracture.   2.  Chronic sphenoid sinus disease.      CT-CSPINE WITHOUT PLUS RECONS   Final Result       Degenerative postsurgical changes of the cervical spine without acute osseous abnormality.      CT-CHEST,ABDOMEN,PELVIS WITH   Final Result      1.  No evidence for acute intrathoracic injury.   2.  No evidence for acute intra-abdominal trauma.         DX-CHEST-LIMITED (1 VIEW)   Final Result      1.  No acute cardiopulmonary disease.   2.  Endotracheal tube terminates below the level of the thoracic inlet.   3.  Enteric feeding tube terminates in the left upper quadrant.      US-ABORTED US PROCEDURE    (Results Pending)        Assessment/Plan  * Seizure (HCC)- (present on admission)  Assessment & Plan  Post traumatic seizure with decreased LOC requiring intubation  Keppra 1500mg IV as of , cont home depakote 500mg BID, home topamax held on   Seizure precautions, IV ativan 4mg PRN for seizure activity  Continuous EEG per neuro  He is followed by Dr. Melissa Bloch, neurology as an outpatient    Swelling of limb, left  Assessment & Plan  Left forearm swelling  DVT us negative    Hypomagnesemia  Assessment & Plan  Goal Mg level >2   M.8 and given 2g MgSO4    Obesity (BMI 30.0-34.9)- (present on admission)  Assessment & Plan  Body mass index is 28.63 kg/m².    Parkinson disease (HCC)- (present on admission)  Assessment & Plan  History of  He has not tolerated Sinemet in the past    Respiratory failure following trauma ([facility])- (present on admission)  Assessment & Plan  He was intubated prior to arrival due to decreased level of consciousness  Extubated .  Encourage incentive spirometry and deep breathing efforts  Mobilize    Dementia ([facility])- (present on admission)  Assessment & Plan  History of  Followed by Dr. Bloch  He is on Namenda      Hydrocephalus ([facility])- (present on admission)  Assessment & Plan  History of  shunt   Neurology consulting    Trauma- (present on admission)  Assessment & Plan  Fall from ladder with head trauma  Extensive trauma imaging in the ER was negative  Dr. Danielle, trauma  surgery, was consulted and request medical team to take over care  He has been cleared for removal of the hard collar       VTE prophylaxis: enoxaparin ppx    I have performed a physical exam and reviewed and updated ROS and Plan today (7/8/2021).

## 2021-07-08 NOTE — THERAPY
Missed Therapy     Patient Name: Eriberto Young  Age:  66 y.o., Sex:  male  Medical Record #: [id number]  Today's Date: 7/8/2021 07/08/21 1042   Interdisciplinary Plan of Care Collaboration   IDT Collaboration with  Nursing   Collaboration Comments Attempted OT tx, RN reported pt lethargic and still on continous EEG at this time. Will continue to hold and attempt as appropriate

## 2021-07-08 NOTE — THERAPY
Physical Therapy Contact Note      Attempted to see patient for PT session, patient on continuous EEG and not able to participate in OOB activity due to recording.  Will hold and follow up as able/appropriate.    Erum Landa PT, DPT

## 2021-07-08 NOTE — CARE PLAN
The patient is Watcher - Medium risk of patient condition declining or worsening    Shift Goals  Clinical Goals: Monitor for seizure   Patient Goals: [place]  Family Goals:  (No family present)    Progress made toward(s) clinical / shift goals:  Patient had one episode of sporadic jerking ~5min. SLP able to start diet for patient. Alert to self, remains in restraints for safety.     Patient is not progressing towards the following goals:

## 2021-07-09 ENCOUNTER — TELEPHONE (OUTPATIENT)
Dept: NEUROLOGY | Facility: MEDICAL CENTER | Age: 66
End: 2021-07-09

## 2021-07-09 LAB
ANION GAP SERPL CALC-SCNC: 12 MMOL/L (ref 7–16)
BUN SERPL-MCNC: 5 MG/DL (ref 8–22)
CALCIUM SERPL-MCNC: 9.2 MG/DL (ref 8.5–10.5)
CHLORIDE SERPL-SCNC: 106 MMOL/L (ref 96–112)
CO2 SERPL-SCNC: 23 MMOL/L (ref 20–33)
CREAT SERPL-MCNC: 0.67 MG/DL (ref 0.5–1.4)
GLUCOSE SERPL-MCNC: 103 MG/DL (ref 65–99)
MAGNESIUM SERPL-MCNC: 1.8 MG/DL (ref 1.5–2.5)
PHOSPHATE SERPL-MCNC: 2.8 MG/DL (ref 2.5–4.5)
POTASSIUM SERPL-SCNC: 3.6 MMOL/L (ref 3.6–5.5)
SODIUM SERPL-SCNC: 141 MMOL/L (ref 135–145)

## 2021-07-09 PROCEDURE — 700111 HCHG RX REV CODE 636 W/ 250 OVERRIDE (IP): Performed by: INTERNAL MEDICINE

## 2021-07-09 PROCEDURE — 92526 ORAL FUNCTION THERAPY: CPT

## 2021-07-09 PROCEDURE — 700111 HCHG RX REV CODE 636 W/ 250 OVERRIDE (IP): Performed by: PSYCHIATRY & NEUROLOGY

## 2021-07-09 PROCEDURE — 99233 SBSQ HOSP IP/OBS HIGH 50: CPT | Performed by: HOSPITALIST

## 2021-07-09 PROCEDURE — 700102 HCHG RX REV CODE 250 W/ 637 OVERRIDE(OP): Performed by: INTERNAL MEDICINE

## 2021-07-09 PROCEDURE — 95714 VEEG EA 12-26 HR UNMNTR: CPT | Performed by: STUDENT IN AN ORGANIZED HEALTH CARE EDUCATION/TRAINING PROGRAM

## 2021-07-09 PROCEDURE — 80048 BASIC METABOLIC PNL TOTAL CA: CPT

## 2021-07-09 PROCEDURE — 700111 HCHG RX REV CODE 636 W/ 250 OVERRIDE (IP): Performed by: HOSPITALIST

## 2021-07-09 PROCEDURE — A9270 NON-COVERED ITEM OR SERVICE: HCPCS | Performed by: INTERNAL MEDICINE

## 2021-07-09 PROCEDURE — 700101 HCHG RX REV CODE 250: Performed by: PSYCHIATRY & NEUROLOGY

## 2021-07-09 PROCEDURE — 36415 COLL VENOUS BLD VENIPUNCTURE: CPT

## 2021-07-09 PROCEDURE — 99232 SBSQ HOSP IP/OBS MODERATE 35: CPT | Mod: 25 | Performed by: PSYCHIATRY & NEUROLOGY

## 2021-07-09 PROCEDURE — 4A00X4Z MEASUREMENT OF CENTRAL NERVOUS ELECTRICAL ACTIVITY, EXTERNAL APPROACH: ICD-10-PCS | Performed by: STUDENT IN AN ORGANIZED HEALTH CARE EDUCATION/TRAINING PROGRAM

## 2021-07-09 PROCEDURE — 700105 HCHG RX REV CODE 258: Performed by: STUDENT IN AN ORGANIZED HEALTH CARE EDUCATION/TRAINING PROGRAM

## 2021-07-09 PROCEDURE — 700111 HCHG RX REV CODE 636 W/ 250 OVERRIDE (IP): Performed by: STUDENT IN AN ORGANIZED HEALTH CARE EDUCATION/TRAINING PROGRAM

## 2021-07-09 PROCEDURE — 83735 ASSAY OF MAGNESIUM: CPT

## 2021-07-09 PROCEDURE — 770006 HCHG ROOM/CARE - MED/SURG/GYN SEMI*

## 2021-07-09 PROCEDURE — 95718 EEG PHYS/QHP 2-12 HR W/VEEG: CPT | Performed by: STUDENT IN AN ORGANIZED HEALTH CARE EDUCATION/TRAINING PROGRAM

## 2021-07-09 PROCEDURE — 84100 ASSAY OF PHOSPHORUS: CPT

## 2021-07-09 RX ORDER — LEVETIRACETAM 5 MG/ML
500 INJECTION INTRAVASCULAR EVERY 12 HOURS
Status: DISCONTINUED | OUTPATIENT
Start: 2021-07-09 | End: 2021-07-12

## 2021-07-09 RX ORDER — TOPIRAMATE 100 MG/1
100 TABLET, FILM COATED ORAL 2 TIMES DAILY
Status: DISCONTINUED | OUTPATIENT
Start: 2021-07-09 | End: 2021-07-12 | Stop reason: HOSPADM

## 2021-07-09 RX ADMIN — ENOXAPARIN SODIUM 40 MG: 40 INJECTION SUBCUTANEOUS at 05:31

## 2021-07-09 RX ADMIN — LEVETIRACETAM 1500 MG: 15 INJECTION INTRAVENOUS at 05:19

## 2021-07-09 RX ADMIN — DULOXETINE HYDROCHLORIDE 30 MG: 30 CAPSULE, DELAYED RELEASE ORAL at 18:10

## 2021-07-09 RX ADMIN — DEXTROSE MONOHYDRATE 500 MG: 50 INJECTION, SOLUTION INTRAVENOUS at 06:13

## 2021-07-09 RX ADMIN — LEVETIRACETAM INJECTION 500 MG: 5 INJECTION INTRAVENOUS at 18:04

## 2021-07-09 RX ADMIN — TOPIRAMATE 100 MG: 100 TABLET, FILM COATED ORAL at 18:10

## 2021-07-09 RX ADMIN — DEXTROSE MONOHYDRATE 500 MG: 50 INJECTION, SOLUTION INTRAVENOUS at 20:21

## 2021-07-09 RX ADMIN — KETOROLAC TROMETHAMINE 30 MG: 30 INJECTION, SOLUTION INTRAMUSCULAR; INTRAVENOUS at 20:21

## 2021-07-09 ASSESSMENT — PAIN DESCRIPTION - PAIN TYPE
TYPE: ACUTE PAIN

## 2021-07-09 NOTE — PROGRESS NOTES
Hospital Medicine Daily Progress Note    Date of Service  7/9/2021    Chief Complaint  Respiratory failure and Seizure after fall from ladder    Hospital Course  Eriberto Young is a 66 y.o. male with a hx of migraine headaches, dementia, Parkinson, traumatic brain injury 2006 with subsequent complex partial seizures, prior  shunt for hydrocephalus admitted 7/2/2021 with trauma from fall of a 10 ft ladder and a witnessed seizure after the fall.  He was intubated for airway protection in the field. He has continued to have recurrent seizures while hospitalized and neurology was consulted. His AED regimen has been adjusted and patient was placed on continuous EEG on 7/7.  He had 1 nonepileptic event on 7/8 after which neurology has been making adjustments to his AED and reducing doses.    Interval Problem Update  Patient had a nonepileptic clinical event yesterday afternoon which was not noted with any seizure activity while on EEG  Medication adjustment per neurology  Patient's mental status this morning is fairly unchanged  Having head and leg jerking which does not seem like a seizure. Mumbling. Not following commands    I have personally seen and examined the patient at bedside. I discussed the plan of care with bedside RN,  and neurology.    Consultants/Specialty  critical care, neurology and trauma surgery    Code Status  Full Code    Disposition  Patient is not medically cleared.   Anticipate discharge to to be determined.  I have placed the appropriate orders for post-discharge needs.    Review of Systems  Review of Systems   Unable to perform ROS: Mental status change        Physical Exam  Temp:  [35.8 °C (96.5 °F)-36.4 °C (97.5 °F)] 36.3 °C (97.3 °F)  Pulse:  [57-85] 68  Resp:  [12-18] 18  BP: (128-141)/() 129/81  SpO2:  [93 %-98 %] 97 %    Physical Exam  Vitals and nursing note reviewed.   Constitutional:       Appearance: Normal appearance.      Comments: Lethargic   Cardiovascular:       Rate and Rhythm: Normal rate and regular rhythm.      Heart sounds: No murmur heard.     Pulmonary:      Effort: Pulmonary effort is normal. No respiratory distress.      Breath sounds: Normal breath sounds.   Neurological:      Comments: lethargic         Fluids    Intake/Output Summary (Last 24 hours) at 7/9/2021 1314  Last data filed at 7/8/2021 2000  Gross per 24 hour   Intake 480 ml   Output --   Net 480 ml       Laboratory  Recent Labs     07/07/21  0255   WBC 9.3   RBC 4.47*   HEMOGLOBIN 14.3   HEMATOCRIT 40.9*   MCV 91.5   MCH 32.0   MCHC 35.0   RDW 40.6   PLATELETCT 178   MPV 9.8     Recent Labs     07/07/21  0254   SODIUM 139   POTASSIUM 4.0   CHLORIDE 107   CO2 22   GLUCOSE 96   BUN 8   CREATININE 0.70   CALCIUM 9.0                   Imaging  US-EXTREMITY VENOUS UPPER UNILAT LEFT   Final Result      CT-HEAD W/O   Final Result         1. Stable course of the right frontal ventriculostomy catheter, with no significant change in symmetric bilateral ventricular dilation.   2. No calvarial fracture or acute intracranial hemorrhage.   3. Chronic right sphenoid sinus disease.      CT-HEAD W/O   Final Result      1.  Hydrocephalus with right frontal ventriculostomy tube in place. Ventricular size is similar to the prior exam.      2.  No acute intracranial hemorrhage.         DX-CHEST-PORTABLE (1 VIEW)   Final Result         1. No significant interval change.      CT-TSPINE W/O PLUS RECONS   Final Result      Degenerative changes of the thoracic spine without acute osseous abnormality.      CT-LSPINE W/O PLUS RECONS   Final Result      1.  No lumbar spine fracture.   2.  Mild lower lumbar spine degenerative changes with associated minimal retrolisthesis at L3-4 and L4-5.      CT-HEAD W/O   Final Result      1.  Hydrocephalus with ventriculostomy in place.   2.  No acute intracranial hemorrhage or territorial infarct.   3.  Chronic sphenoid sinus disease.      CT-MAXILLOFACIAL W/O PLUS RECONS   Final Result       1.  No facial fracture.   2.  Chronic sphenoid sinus disease.      CT-CSPINE WITHOUT PLUS RECONS   Final Result      Degenerative postsurgical changes of the cervical spine without acute osseous abnormality.      CT-CHEST,ABDOMEN,PELVIS WITH   Final Result      1.  No evidence for acute intrathoracic injury.   2.  No evidence for acute intra-abdominal trauma.         DX-CHEST-LIMITED (1 VIEW)   Final Result      1.  No acute cardiopulmonary disease.   2.  Endotracheal tube terminates below the level of the thoracic inlet.   3.  Enteric feeding tube terminates in the left upper quadrant.      US-ABORTED US PROCEDURE    (Results Pending)        Assessment/Plan  * Seizure (HCC)- (present on admission)  Assessment & Plan  Post traumatic seizure with decreased LOC requiring intubation  Keppra 1500mg IV as of , cont home depakote 500mg BID, home topamax held on   Seizure precautions, IV ativan 4mg PRN for seizure activity  Continuous EEG per neuro- Given event  appears to be nonepileptic.  Will de-escalate medications today.  Stop the Vimpat.  Decrease the Keppra.  Topamax restarted by neuro for mood stabilizer, NOT seizure ppx     Plan:  1.  Continuous EEG  2.  Continue the Depakote 500 mg twice daily  3.  Decrease the Keppra to 500 mg twice daily  4.  Restart Topamax 100 mg twice daily  5.  Seizure precautions  6.  Avoid the as needed Ativan for now.  Call if seizure activity persists longer than 5 to 10 minutes.  7.  Discontinue Vimpat 200 mg twice daily      Swelling of limb, left  Assessment & Plan  Left forearm swelling  DVT us negative    Hypomagnesemia  Assessment & Plan  Goal Mg level >2   M.8 and given 2g MgSO4    Obesity (BMI 30.0-34.9)- (present on admission)  Assessment & Plan  Body mass index is 28.63 kg/m².    Parkinson disease (HCC)- (present on admission)  Assessment & Plan  History of  He has not tolerated Sinemet in the past    Respiratory failure following trauma (HCC)- (present on  admission)  Assessment & Plan  He was intubated prior to arrival due to decreased level of consciousness  Extubated 7/4.  Encourage incentive spirometry and deep breathing efforts  Mobilize    Dementia (HCC)- (present on admission)  Assessment & Plan  History of  Followed by Dr. Bloch  He is on Namenda      Hydrocephalus (HCC)- (present on admission)  Assessment & Plan  History of  shunt 2011  Neurology consulting    Trauma- (present on admission)  Assessment & Plan  Fall from ladder with head trauma  Extensive trauma imaging in the ER was negative  Dr. Danielle, trauma surgery, was consulted and request medical team to take over care  He has been cleared for removal of the hard collar       VTE prophylaxis: enoxaparin ppx    I have performed a physical exam and reviewed and updated ROS and Plan today (7/9/2021).

## 2021-07-09 NOTE — THERAPY
Speech Language Pathology   Clinical Swallow Evaluation     Patient Name: Eriberto Young  AGE:  66 y.o., SEX:  male  Medical Record #: [id number]  Today's Date: 7/8/2021     Assessment    Patient is 66 y.o. male with a hx of migraine headaches, dementia, Parkinson, traumatic brain injury 2006 with subsequent complex partial seizures, prior  shunt for hydrocephalus admitted 7/2/2021 with trauma from fall of a 10 ft ladder and a witnessed seizure after the fall.  He was intubated for airway protection in the field. He has continued to have recurrent seizures while hospitalized.  The patient had improved alertness this session and was seen for clinical swallow evaluation. The patient was unable to follow oral motor directives however, did attempt to follow directives and was noted to have delay of >15 seconds during directives. The patient was given PO trials of ice chips, thins, mildly thick liquids, purees and soft solids. The patient presented with overt s/s concerning for aspiration on thin liquid and mixed consistency trials. The patient was provided education regarding swallow strategies and SLP recommendations. Patient unable to verbalize understanding but patient's SO was present and verbalized clear understanding.     Plan    Recommendations: 1) Initiate Soft and bite size meal items with Mildly thick liquids with medications crushed in purees.  2) Request orders for cognitive-linguistic evaluation.      Recommend Speech Therapy 5 times per week until therapy goals are met for the following treatments:  Dysphagia Training.    Discharge Recommendations: Recommend post-acute placement for additional speech therapy services prior to discharge home    Objective       07/08/21 1335   Oral Motor Eval    Is Patient Able to Complete Oral Motor Eval No   Barriers To Oral Feeding   Barriers To Oral Feeding Impaired Cognition / Attention   Pre-Feeding Oral Stimulation Trial Intact   Laryngeal Function   Voice Quality  "Within Functional Limits   Volutional Cough Within Functional Limits   Excursion Upon Swallow Complete   Oral Food Presentation   Ice Chips Within Functional Limits   Single Swallow Mildly Thick (2) - (Nectar Thick)  Within Functional Limits   Serial Swallow Mildly Thick (2) - (Nectar Thick) Minimal   Single Swallow Thin (0) Moderate   Serial Swallow Thin (0) Moderate   Liquidised (3) Within Functional Limits   Pureed (4) Within Functional Limits   Minced & Moist (5) - (Dysphagia II) Within Functional Limits   Soft & Bite-Sized (6) - (Dysphagia III) Within Functional Limits   Regular (7) Minimal   Regular-Easy to Chew (7) Minimal   Tracheostomy   Tracheostomy  No   Dysphagia Strategies / Recommendations   Strategies / Interventions Recommended (Yes / No) Yes   Compensatory Strategies Head of Bed 90 Degrees During Eating / Drinking;Multiple Swallows;Single Sips / Bites   Diet / Liquid Recommendation Soft & Bite-Sized (6) - (Dysphagia III);Mildly Thick (2) - (Nectar Thick)   Medication Administration  Crush all Medications in Puree   Therapy Interventions Dysphagia Therapy By Speech Language Pathologist;Pharyngeal / Laryngeal Exercises;Oral Motor Exercises   Dysphagia Rating   Nutritional Liquid Intake Rating Scale Thickened beverages (mildly thick unless otherwise specified)   Nutritional Food Intake Rating Scale Total oral diet with multiple consistencies without special preparation but with specific food limitations   Patient / Family Goals   Patient / Family Goal #1 \" Can I have water?\"   Short Term Goals   Short Term Goal # 1 The patient will utilize swallow strategies during SB6/MT2 meal items with no overt s/s of aspiration given min A         "

## 2021-07-09 NOTE — CARE PLAN
Problem: Knowledge Deficit - Standard  Goal: Patient and family/care givers will demonstrate understanding of plan of care, disease process/condition, diagnostic tests and medications  Outcome: Progressing  Note: Pt unable to verbalize any understanding of his plan of care.      Problem: Pain - Standard  Goal: Alleviation of pain or a reduction in pain to the patient’s comfort goal  Outcome: Progressing  Note: Pt educated on 0-10 pain scale, educated on both pharmacological and non-pharmacological methods of pain control.      The patient is Stable - Low risk of patient condition declining or worsening    Shift Goals  Clinical Goals: Monitor for seizures  Patient Goals: Sleep  Family Goals:  (No family present)    Progress made toward(s) clinical / shift goals: Pt had no seizure activity

## 2021-07-09 NOTE — DISCHARGE PLANNING
Anticipated Discharge Disposition: TBD    Action: Chart review and assessment complete. Patient was admitted after falling off a ladder. Currently receiving another 24 hour EEG. Therapy evals have been placed on hold due to this. Documentation shows that therapies will evaluate on Monday 07/12.     Patient's dc support is spouse.   PCP: Bakari Salgado   Pay source: [employer]     No post acute referrals/orders at this time as dc needs are unknown     Barriers to Discharge: On going medical work up and unknown dc needs     Plan: Assist with dc planning once known     Care Transition Team Assessment    Information Source  Orientation Level: Oriented to person, Oriented to time  Informant's Name: Chart Review  Who is responsible for making decisions for patient? : Legal next of kin  Name(s) of Primary Decision Maker: spouse     Readmission Evaluation  Is this a readmission?: No    Elopement Risk  Legal Hold: No  Ambulatory or Self Mobile in Wheelchair: Yes  Disoriented: Place-At Risk for Elopement, Time-At Risk for Elopement, Situation-At Risk for Elopement  Elopement Risk: At Risk for Elopement    Interdisciplinary Discharge Planning  Primary Care Physician: Bakari Salgado  Lives with - Patient's Self Care Capacity: Spouse  Housing / Facility: [address] [facility]  Prior Services: Intermittent Physical Support for ADL Per Family    Discharge Preparedness  What is your plan after discharge?: Uncertain - pending medical team collaboration  What are your discharge supports?: Spouse    Finances  Financial Barriers to Discharge: No  Prescription Coverage: Yes    Advance Directive  Advance Directive?: Clinically incapacitated / Inappropriate    Psychological Assessment  History of Substance Abuse: None  History of Psychiatric Problems: No  Non-compliant with Treatment: No  Newly Diagnosed Illness: Yes    Discharge Risks or Barriers  Discharge risks or barriers?: Complex medical needs    Anticipated Discharge Information  Discharge  Disposition: Still a Patient

## 2021-07-09 NOTE — TELEPHONE ENCOUNTER
I    [phone]  Pt wife Kallie called to let dr Bloch know pt is in hospital since last Friday, in neuro dept.

## 2021-07-09 NOTE — PROCEDURES
CONTINUOUS VIDEO ELECTROENCEPHALOGRAM REPORT        Referring provider: Dr. Alex     DOS: 7/9/2021 (6 hours and 13 minutes of total recording time).      INDICATION:  Eriberto Young 66 y.o. male presenting with seizure(s)     CURRENT ANTIEPILEPTIC AND/OR SEDATING REGIMEN: keppra, vimpat, depakote     TECHNIQUE: CVEEG was set up by a Neurodiagnostic technologist who performed education to the patient and staff. A minimum of 23 electrodes and 23 channel recording was setup and performed by Neurodiagnostic technologist, in accordance with the international 10-20 system. Impedence, electrode integrity, and technical impressions were documented a minimum of every 2-24 hour period by a Neurodiagnostic Technologist and reviewed by Interpreting Physician. The study was reviewed in bipolar and referential montages. The recording examined the patient in the awake, drowsy, and sleep state(s).      DESCRIPTION OF THE RECORD:  During wakefulness, the background was continuous and showed a 8 Hz posterior dominant rhythm, with a mixture of alpha, theta, delta activity.  There was reactivity to eye closure/opening.  A normal anterior-posterior gradient was noted with faster beta frequencies seen anteriorly.  During drowsiness, theta/delta frequencies were seen.     Sleep was captured and was characterized by diffuse background delta/theta activity with a loss of myogenic artifact.  N2 sleep transients in the form of sleep spindles and vertex waves were seen in the leads over the central regions.      ACTIVATION PROCEDURES:   NA        ICTAL AND INTERICTAL FINDINGS:   1 clinical event captured at 11:04 AM.  Patient is seen having right arm shaking which progresses to semirhythmic forward and backward head and body movements that vary in intensity and frequency, stopping and starting again. Event lasted approximately 2 to 3 minutes.  The EEG does not show any definitive ictal rhythm.     No regional slowing was seen during this  routine study.       No clinical events or seizures were reported or recorded during the study.      EKG: sampling of the EKG recording did not demonstrate any abnormalities           INTERPRETATION:  Abnormal video EEG recording in the awake, drowsy, and sleep state(s):  - Mild to moderate background slowing suggestive of diffuse/multifocal cerebral dysfunction.   - No persistent focal asymmetries seen.  - 1 clinical event captured at 11:04 AM.  Patient is seen having right arm shaking which progresses to semirhythmic forward and backward head and body movements that vary in intensity and frequency, stopping and starting again. Event lasted approximately 2 to 3 minutes.  The EEG does not show any definitive ictal rhythm. Clinically, the shaking movements captured are not consistent with an epileptic seizure as well.      Findings discussed with Dr. Isai Pham MD  Epilepsy and General Neurology  Department of Neurology  Instructor of Clinical Neurology [facility] [employer] [facility].   Office: [phone]  Fax: [phone]

## 2021-07-09 NOTE — PROGRESS NOTES
Neurology Progress Note  Neurohospitalist Service, [facility]    Referring Physician: Timothy Fan M.D.    No chief complaint on file.      HPI: Refer to initial documented Neurology H&P, as detailed in the patient's chart.    Interval History July 9, 2021:    Patient had event on EEG generalized shaking around 2 PM yesterday.  It was nonepileptic.    Review of systems: In addition to what is detailed in the HPI and/or updated in the interval history, all other systems reviewed and are negative.    Past Medical History:    has no past medical history on file.    FHx:  family history is not on file.    SHx:       Medications:    Current Facility-Administered Medications:   •  levETIRAcetam (Keppra) 500 mg in 100 mL NaCl IV premix, 500 mg, Intravenous, Q12HRS, Lucio Alex M.D.  •  topiramate (TOPAMAX) tablet 100 mg, 100 mg, Oral, BID, Lucio Alex M.D.  •  ketorolac (TORADOL) injection 30 mg, 30 mg, Intravenous, Q6HRS PRN, Timothy Fan M.D., 30 mg at 07/06/21 1708  •  [Held by provider] lacosamide (VIMPAT) 200 mg in  mL ivpb, 200 mg, Intravenous, Q12HRS, Lucio Alex M.D., Stopped at 07/08/21 1839  •  fentaNYL (SUBLIMAZE) injection 12.5-25 mcg, 12.5-25 mcg, Intravenous, Q4HRS PRN, Weston Gross M.D., 12.5 mcg at 07/04/21 2259  •  enoxaparin (LOVENOX) inj 40 mg, 40 mg, Subcutaneous, DAILY, Weston Gross M.D., 40 mg at 07/09/21 0531  •  DULoxetine (CYMBALTA) capsule 30 mg, 30 mg, Oral, Q EVENING, Weston Gross M.D., 30 mg at 07/08/21 1739  •  senna-docusate (PERICOLACE or SENOKOT S) 8.6-50 MG per tablet 2 tablet, 2 tablet, Oral, BID, 2 tablet at 07/05/21 0522 **AND** polyethylene glycol/lytes (MIRALAX) PACKET 1 Packet, 1 Packet, Oral, QDAY PRN, 1 Packet at 07/05/21 0522 **AND** magnesium hydroxide (MILK OF MAGNESIA) suspension 30 mL, 30 mL, Oral, QDAY PRN **AND** bisacodyl (DULCOLAX) suppository 10 mg, 10 mg, Rectal, QDAY PRN, Weston Christianson  CHRIS Cuello  •  acetaminophen (Tylenol) tablet 650 mg, 650 mg, Oral, Q6HRS PRN, Weston Gross M.D., 650 mg at 07/08/21 1431  •  ondansetron (ZOFRAN ODT) dispertab 4 mg, 4 mg, Oral, Q4HRS PRN, Weston Gross M.D.  •  valproate (DEPACON) 500 mg in dextrose 5% 50 mL IVPB, 500 mg, Intravenous, Q12HRS, Ray Warner D.O., Last Rate: 50 mL/hr at 07/09/21 0613, 500 mg at 07/09/21 0613  •  Respiratory Therapy Consult, , Nebulization, Continuous RT, Angelique Orr M.D.  •  LORazepam (ATIVAN) injection 2-4 mg, 2-4 mg, Intravenous, Q5 MIN PRN, Angelique Orr M.D., 2 mg at 07/07/21 0241  •  ondansetron (ZOFRAN) syringe/vial injection 4 mg, 4 mg, Intravenous, Q4HRS PRN, Pal Blackmon M.D., 4 mg at 07/06/21 2236  •  enalaprilat (VASOTEC) injection 1.25 mg, 1.25 mg, Intravenous, Q6HRS PRN, Pal Blackmon M.D.  •  labetalol (NORMODYNE/TRANDATE) injection 10 mg, 10 mg, Intravenous, Q4HRS PRN, Pal Blackmon M.D., 10 mg at 07/04/21 2111    Physical Examination:     Vitals:    07/08/21 1650 07/08/21 2000 07/09/21 0000 07/09/21 0400   BP: 128/101 141/77 138/82 128/78   Pulse: (!) 57 68 85 78   Resp: 17 12 16 14   Temp: 36.1 °C (97 °F) 36.4 °C (97.5 °F) 35.8 °C (96.5 °F) 36.1 °C (96.9 °F)   TempSrc: Temporal Temporal Temporal Temporal   SpO2: 95% 93% 97% 98%   Weight:       Height:           This morning patient is more awake and alert.  Oriented x3.  Speech is intact.  Moving all 4 to antigravity.  Sensation extremities are intact.  Cranial 2-12 appear grossly unremarkable.  Pupils are equal reactive.  No gaze preference.  Face is symmetrical.  Sensation is intact.  Hearing is intact.     Objective Data:    Labs:  Lab Results   Component Value Date/Time    PROTHROMBTM 12.6 07/02/2021 10:56 AM    INR 0.97 07/02/2021 10:56 AM      Lab Results   Component Value Date/Time    WBC 9.3 07/07/2021 02:55 AM    RBC 4.47 (L) 07/07/2021 02:55 AM    HEMOGLOBIN 14.3 07/07/2021 02:55 AM    HEMATOCRIT 40.9 (L) 07/07/2021  02:55 AM    MCV 91.5 07/07/2021 02:55 AM    MCH 32.0 07/07/2021 02:55 AM    MCHC 35.0 07/07/2021 02:55 AM    MPV 9.8 07/07/2021 02:55 AM    NEUTSPOLYS 48.80 07/07/2021 02:55 AM    LYMPHOCYTES 31.10 07/07/2021 02:55 AM    MONOCYTES 11.10 07/07/2021 02:55 AM    EOSINOPHILS 7.40 (H) 07/07/2021 02:55 AM    BASOPHILS 1.10 07/07/2021 02:55 AM      Lab Results   Component Value Date/Time    SODIUM 139 07/07/2021 02:54 AM    POTASSIUM 4.0 07/07/2021 02:54 AM    CHLORIDE 107 07/07/2021 02:54 AM    CO2 22 07/07/2021 02:54 AM    GLUCOSE 96 07/07/2021 02:54 AM    BUN 8 07/07/2021 02:54 AM    CREATININE 0.70 07/07/2021 02:54 AM      Lab Results   Component Value Date/Time    TRIGLYCERIDE 203 (H) 07/02/2021 01:35 PM       Lab Results   Component Value Date/Time    ALKPHOSPHAT 53 07/02/2021 01:35 PM    ASTSGOT 20 07/02/2021 01:35 PM    ALTSGPT 19 07/02/2021 01:35 PM    TBILIRUBIN 0.4 07/02/2021 01:35 PM        Imaging/Testing:  US-EXTREMITY VENOUS UPPER UNILAT LEFT   Final Result      CT-HEAD W/O   Final Result         1. Stable course of the right frontal ventriculostomy catheter, with no significant change in symmetric bilateral ventricular dilation.   2. No calvarial fracture or acute intracranial hemorrhage.   3. Chronic right sphenoid sinus disease.      CT-HEAD W/O   Final Result      1.  Hydrocephalus with right frontal ventriculostomy tube in place. Ventricular size is similar to the prior exam.      2.  No acute intracranial hemorrhage.         DX-CHEST-PORTABLE (1 VIEW)   Final Result         1. No significant interval change.      CT-TSPINE W/O PLUS RECONS   Final Result      Degenerative changes of the thoracic spine without acute osseous abnormality.      CT-LSPINE W/O PLUS RECONS   Final Result      1.  No lumbar spine fracture.   2.  Mild lower lumbar spine degenerative changes with associated minimal retrolisthesis at L3-4 and L4-5.      CT-HEAD W/O   Final Result      1.  Hydrocephalus with ventriculostomy in  place.   2.  No acute intracranial hemorrhage or territorial infarct.   3.  Chronic sphenoid sinus disease.      CT-MAXILLOFACIAL W/O PLUS RECONS   Final Result      1.  No facial fracture.   2.  Chronic sphenoid sinus disease.      CT-CSPINE WITHOUT PLUS RECONS   Final Result      Degenerative postsurgical changes of the cervical spine without acute osseous abnormality.      CT-CHEST,ABDOMEN,PELVIS WITH   Final Result      1.  No evidence for acute intrathoracic injury.   2.  No evidence for acute intra-abdominal trauma.         DX-CHEST-LIMITED (1 VIEW)   Final Result      1.  No acute cardiopulmonary disease.   2.  Endotracheal tube terminates below the level of the thoracic inlet.   3.  Enteric feeding tube terminates in the left upper quadrant.      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  Was admitted few days ago after the seizure falling from a ladder.  Has been having daily seizures while admitted.  Yesterday we increased the Keppra and the Topamax.  His home medications are Depakote and the low-dose Topamax.  Yesterday after the seizure in the morning he was back to his baseline state.  Therefore I have low concern for an ongoing infection or other secondary causes for the increase seizures.  Not sure was causing the increase now.  Looks like his baseline is 1 seizure a week.    Update 7/7  Patient continues to have multiple seizures despite initiation and escalation of multiple AEDs.  The nurses told me overnight he would do this bilateral arm tremoring.  Sometimes he is responding to them during the events.  He is postictal afterwards but usually gets Ativan for these events also.  Still odd to have this escalation of seizures with no obvious cause so far and being resistant to multiple medications.  Repeat imaging has been unremarkable.  We may need to  consider MRI brain.  And lumbar puncture.  However in the meantime we will get a continuous EEG to confirm seizure and the focus.      Update 7/8  We will continue EEG for now.  Make sure he truly is stable.  Continue the AEDs unchanged for now.    Update 7/9  Given yesterday event appears to be nonepileptic.  Will de-escalate medications today.  Stop the Vimpat.  Decrease the Keppra.  I restarted the Topamax.  Not because of seizure prophylaxis but because Topamax is known to be a behavior mood stabilizer.  Along with the Depakote.      Plan:  1.  Continuous EEG  2.  Continue the Depakote 500 mg twice daily  3.  Decrease the Keppra to 500 mg twice daily  4.  Restart Topamax 100 mg twice daily  5.  Seizure precautions  6.  Avoid the as needed Ativan for now.  Call if seizure activity persists longer than 5 to 10 minutes.  7.  Discontinue Vimpat 200 mg twice daily                The evaluation of the patient, and recommended management, was discussed with the resident staff. I have performed a physical exam and reviewed and updated ROS and Plan today (7/9/2021). In review of yesterday's note (7/8/2021), there are no changes except as documented above.    This chart was partially generated using voice recognition technology and sound alike word replacement may be present, best efforts were made to make the chart accurate.    Lucio Alex MD  Board Certified Neurology, ABPN  t) [phone]

## 2021-07-09 NOTE — THERAPY
Missed Therapy     Patient Name: Eriberto Young  Age:  66 y.o., Sex:  male  Medical Record #: [id number]  Today's Date: 7/9/2021 07/09/21 0841   Interdisciplinary Plan of Care Collaboration   IDT Collaboration with  Nursing   Collaboration Comments continue to hold, pt to be on on continuous EEG another 24hrs per nursing following medication adjustments. Will f/u on Monday 7/12 for appropriateness, may need re-evaluation.

## 2021-07-10 PROBLEM — F44.5 PSYCHOGENIC NONEPILEPTIC SEIZURE: Status: ACTIVE | Noted: 2021-07-02

## 2021-07-10 PROCEDURE — 92526 ORAL FUNCTION THERAPY: CPT

## 2021-07-10 PROCEDURE — 700102 HCHG RX REV CODE 250 W/ 637 OVERRIDE(OP): Performed by: STUDENT IN AN ORGANIZED HEALTH CARE EDUCATION/TRAINING PROGRAM

## 2021-07-10 PROCEDURE — 700111 HCHG RX REV CODE 636 W/ 250 OVERRIDE (IP): Performed by: INTERNAL MEDICINE

## 2021-07-10 PROCEDURE — 99232 SBSQ HOSP IP/OBS MODERATE 35: CPT | Performed by: HOSPITALIST

## 2021-07-10 PROCEDURE — A9270 NON-COVERED ITEM OR SERVICE: HCPCS | Performed by: STUDENT IN AN ORGANIZED HEALTH CARE EDUCATION/TRAINING PROGRAM

## 2021-07-10 PROCEDURE — 99231 SBSQ HOSP IP/OBS SF/LOW 25: CPT | Performed by: PSYCHIATRY & NEUROLOGY

## 2021-07-10 PROCEDURE — 700111 HCHG RX REV CODE 636 W/ 250 OVERRIDE (IP): Performed by: HOSPITALIST

## 2021-07-10 PROCEDURE — 700102 HCHG RX REV CODE 250 W/ 637 OVERRIDE(OP): Performed by: INTERNAL MEDICINE

## 2021-07-10 PROCEDURE — A9270 NON-COVERED ITEM OR SERVICE: HCPCS | Performed by: INTERNAL MEDICINE

## 2021-07-10 PROCEDURE — 700111 HCHG RX REV CODE 636 W/ 250 OVERRIDE (IP): Performed by: STUDENT IN AN ORGANIZED HEALTH CARE EDUCATION/TRAINING PROGRAM

## 2021-07-10 PROCEDURE — 700111 HCHG RX REV CODE 636 W/ 250 OVERRIDE (IP): Performed by: PSYCHIATRY & NEUROLOGY

## 2021-07-10 PROCEDURE — 700105 HCHG RX REV CODE 258: Performed by: STUDENT IN AN ORGANIZED HEALTH CARE EDUCATION/TRAINING PROGRAM

## 2021-07-10 PROCEDURE — 770006 HCHG ROOM/CARE - MED/SURG/GYN SEMI*

## 2021-07-10 PROCEDURE — 700101 HCHG RX REV CODE 250: Performed by: PSYCHIATRY & NEUROLOGY

## 2021-07-10 RX ORDER — TRAZODONE HYDROCHLORIDE 100 MG/1
50 TABLET ORAL ONCE
Status: COMPLETED | OUTPATIENT
Start: 2021-07-10 | End: 2021-07-10

## 2021-07-10 RX ADMIN — DULOXETINE HYDROCHLORIDE 30 MG: 30 CAPSULE, DELAYED RELEASE ORAL at 16:52

## 2021-07-10 RX ADMIN — DEXTROSE MONOHYDRATE 500 MG: 50 INJECTION, SOLUTION INTRAVENOUS at 17:20

## 2021-07-10 RX ADMIN — TOPIRAMATE 100 MG: 100 TABLET, FILM COATED ORAL at 05:32

## 2021-07-10 RX ADMIN — ENOXAPARIN SODIUM 40 MG: 40 INJECTION SUBCUTANEOUS at 05:32

## 2021-07-10 RX ADMIN — LEVETIRACETAM INJECTION 500 MG: 5 INJECTION INTRAVENOUS at 16:47

## 2021-07-10 RX ADMIN — TRAZODONE HYDROCHLORIDE 50 MG: 100 TABLET ORAL at 01:17

## 2021-07-10 RX ADMIN — TOPIRAMATE 100 MG: 100 TABLET, FILM COATED ORAL at 16:53

## 2021-07-10 RX ADMIN — KETOROLAC TROMETHAMINE 30 MG: 30 INJECTION, SOLUTION INTRAMUSCULAR; INTRAVENOUS at 21:02

## 2021-07-10 RX ADMIN — DEXTROSE MONOHYDRATE 500 MG: 50 INJECTION, SOLUTION INTRAVENOUS at 05:31

## 2021-07-10 RX ADMIN — LEVETIRACETAM INJECTION 500 MG: 5 INJECTION INTRAVENOUS at 07:10

## 2021-07-10 RX ADMIN — KETOROLAC TROMETHAMINE 30 MG: 30 INJECTION, SOLUTION INTRAMUSCULAR; INTRAVENOUS at 12:06

## 2021-07-10 ASSESSMENT — PAIN DESCRIPTION - PAIN TYPE
TYPE: ACUTE PAIN

## 2021-07-10 ASSESSMENT — ENCOUNTER SYMPTOMS
DIZZINESS: 0
NAUSEA: 0
PALPITATIONS: 0
CHILLS: 0
VOMITING: 0
SHORTNESS OF BREATH: 0
HEADACHES: 0
ABDOMINAL PAIN: 0
FEVER: 0
COUGH: 0

## 2021-07-10 NOTE — THERAPY
Physical Therapy Contact Note    Checked in on patient this am, still on EEG.  Spoke with RN in afternoon, patient no longer on continuous EEG.  Patient may need re-evaluation given recent seizures and status change.  Will follow up as appropriate/able.    Erum Landa PT, DPT

## 2021-07-10 NOTE — CARE PLAN
The patient is Stable - Low risk of patient condition declining or worsening    Shift Goals  Clinical Goals: safety  Patient Goals: slee  Family Goals:  (No family present)    Progress made toward(s) clinical / shift goals:  Patient has all seizure, fall and aspiration precautions in place. Pain is controlled with medication and education was provided on restraint use, no sign of injury.      Problem: Fall Risk  Goal: Patient will remain free from falls  Outcome: Progressing     Problem: Pain - Standard  Goal: Alleviation of pain or a reduction in pain to the patient’s comfort goal  Outcome: Progressing     Problem: Safety - Medical Restraint  Goal: Remains free of injury from restraints (Restraint for Interference with Medical Device)  Outcome: Progressing  Flowsheets (Taken 7/9/2021 6161)  Addressed this shift: Remains free of injury from restraints (restraint for interference with medical device):   Determine that other, less restrictive measures have been tried or would not be effective before applying the restraint   Evaluate the patient's condition at the time of restraint application   Inform patient/family regarding the reason for restraint   Every 2 hours: Monitor safety, psychosocial status, comfort, nutrition and hydration     Patient is not progressing towards the following goals:      Problem: Safety - Medical Restraint  Goal: Free from restraint(s) (Restraint for Interference with Medical Device)  Outcome: Not Progressing  Flowsheets (Taken 7/9/2021 4622)  Addressed this shift: Free from restraint(s) (restraint for interference with medical device):   ONCE/SHIFT or MINIMUM Every 12 hours: Assess and document the continuing need for restraints   Every 24 hours: Continued use of restraint requires Licensed Independent Practitioner to perform face to face examination and written order   Identify and implement measures to help patient regain control  Note: Explained need for restraints, patient verbalized  understanding but needs reinforcement, emperatriz is still pulling at his IV and condom catheter.

## 2021-07-10 NOTE — PROGRESS NOTES
Neurology Progress Note  Neurohospitalist Service, [facility]    Referring Physician: Timothy Fan M.D.    No chief complaint on file.      HPI: Refer to initial documented Neurology H&P, as detailed in the patient's chart.    Interval History July 9, 2021:  Per the nurses note patient had 2 events overnight.    Patient had another event yesterday afternoon. Leeann EEG which is nonepileptic.    Review of systems: In addition to what is detailed in the HPI and/or updated in the interval history, all other systems reviewed and are negative.    Past Medical History:    has no past medical history on file.    FHx:  family history is not on file.    SHx:       Medications:    Current Facility-Administered Medications:   •  levETIRAcetam (Keppra) 500 mg in 100 mL NaCl IV premix, 500 mg, Intravenous, Q12HRS, Lucio Alex M.D., Last Rate: 400 mL/hr at 07/10/21 0710, 500 mg at 07/10/21 0710  •  topiramate (TOPAMAX) tablet 100 mg, 100 mg, Oral, BID, Lucio Alex M.D., 100 mg at 07/10/21 0532  •  ketorolac (TORADOL) injection 30 mg, 30 mg, Intravenous, Q6HRS PRN, Timothy Fan M.D., 30 mg at 07/09/21 2021  •  [Held by provider] lacosamide (VIMPAT) 200 mg in  mL ivpb, 200 mg, Intravenous, Q12HRS, Lucio Alex M.D., Stopped at 07/08/21 1839  •  fentaNYL (SUBLIMAZE) injection 12.5-25 mcg, 12.5-25 mcg, Intravenous, Q4HRS PRN, Weston Gross M.D., 12.5 mcg at 07/04/21 2259  •  enoxaparin (LOVENOX) inj 40 mg, 40 mg, Subcutaneous, DAILY, Weston Gross M.D., 40 mg at 07/10/21 0532  •  DULoxetine (CYMBALTA) capsule 30 mg, 30 mg, Oral, Q EVENING, Weston Gross M.D., 30 mg at 07/09/21 1810  •  senna-docusate (PERICOLACE or SENOKOT S) 8.6-50 MG per tablet 2 tablet, 2 tablet, Oral, BID, 2 tablet at 07/05/21 0522 **AND** polyethylene glycol/lytes (MIRALAX) PACKET 1 Packet, 1 Packet, Oral, QDAY PRN, 1 Packet at 07/05/21 0522 **AND** magnesium hydroxide (MILK OF  MAGNESIA) suspension 30 mL, 30 mL, Oral, QDAY PRN **AND** bisacodyl (DULCOLAX) suppository 10 mg, 10 mg, Rectal, QDAY PRN, Weston Gross M.D.  •  acetaminophen (Tylenol) tablet 650 mg, 650 mg, Oral, Q6HRS PRN, Weston Gross M.D., 650 mg at 07/08/21 1431  •  ondansetron (ZOFRAN ODT) dispertab 4 mg, 4 mg, Oral, Q4HRS PRN, Weston Gross M.D.  •  valproate (DEPACON) 500 mg in dextrose 5% 50 mL IVPB, 500 mg, Intravenous, Q12HRS, Ray Warner D.O., Stopped at 07/10/21 0631  •  Respiratory Therapy Consult, , Nebulization, Continuous RT, Angelique Orr M.D.  •  LORazepam (ATIVAN) injection 2-4 mg, 2-4 mg, Intravenous, Q5 MIN PRN, Timothy Fan M.D., 2 mg at 07/07/21 0241  •  ondansetron (ZOFRAN) syringe/vial injection 4 mg, 4 mg, Intravenous, Q4HRS PRN, Pal Blackmon M.D., 4 mg at 07/06/21 2236  •  enalaprilat (VASOTEC) injection 1.25 mg, 1.25 mg, Intravenous, Q6HRS PRN, Pal Blackmon M.D.  •  labetalol (NORMODYNE/TRANDATE) injection 10 mg, 10 mg, Intravenous, Q4HRS PRN, Pal Blackmon M.D., 10 mg at 07/04/21 2111    Physical Examination:     Vitals:    07/09/21 1600 07/09/21 2000 07/09/21 2342 07/10/21 0324   BP: 135/82 137/98 133/73 143/79   Pulse: 60 81 77 62   Resp: 20 (!) 21 17 17   Temp: 36.3 °C (97.4 °F) 36.4 °C (97.5 °F) 36.3 °C (97.3 °F) 36.3 °C (97.3 °F)   TempSrc: Temporal Temporal Temporal Temporal   SpO2: 95% 94% 94% 97%   Weight:       Height:           This morning patient is more awake and alert.  Oriented x3.  Speech is intact.  Moving all 4 to antigravity.  Sensation extremities are intact.  Cranial 2-12 appear grossly unremarkable.  Pupils are equal reactive.  No gaze preference.  Face is symmetrical.  Sensation is intact.  Hearing is intact.     Objective Data:    Labs:  Lab Results   Component Value Date/Time    PROTHROMBTM 12.6 07/02/2021 10:56 AM    INR 0.97 07/02/2021 10:56 AM      Lab Results   Component Value Date/Time    WBC 9.3 07/07/2021 02:55 AM    RBC  4.47 (L) 07/07/2021 02:55 AM    HEMOGLOBIN 14.3 07/07/2021 02:55 AM    HEMATOCRIT 40.9 (L) 07/07/2021 02:55 AM    MCV 91.5 07/07/2021 02:55 AM    MCH 32.0 07/07/2021 02:55 AM    MCHC 35.0 07/07/2021 02:55 AM    MPV 9.8 07/07/2021 02:55 AM    NEUTSPOLYS 48.80 07/07/2021 02:55 AM    LYMPHOCYTES 31.10 07/07/2021 02:55 AM    MONOCYTES 11.10 07/07/2021 02:55 AM    EOSINOPHILS 7.40 (H) 07/07/2021 02:55 AM    BASOPHILS 1.10 07/07/2021 02:55 AM      Lab Results   Component Value Date/Time    SODIUM 141 07/09/2021 05:56 PM    POTASSIUM 3.6 07/09/2021 05:56 PM    CHLORIDE 106 07/09/2021 05:56 PM    CO2 23 07/09/2021 05:56 PM    GLUCOSE 103 (H) 07/09/2021 05:56 PM    BUN 5 (L) 07/09/2021 05:56 PM    CREATININE 0.67 07/09/2021 05:56 PM      Lab Results   Component Value Date/Time    TRIGLYCERIDE 203 (H) 07/02/2021 01:35 PM       Lab Results   Component Value Date/Time    ALKPHOSPHAT 53 07/02/2021 01:35 PM    ASTSGOT 20 07/02/2021 01:35 PM    ALTSGPT 19 07/02/2021 01:35 PM    TBILIRUBIN 0.4 07/02/2021 01:35 PM        Imaging/Testing:  US-EXTREMITY VENOUS UPPER UNILAT LEFT   Final Result      CT-HEAD W/O   Final Result         1. Stable course of the right frontal ventriculostomy catheter, with no significant change in symmetric bilateral ventricular dilation.   2. No calvarial fracture or acute intracranial hemorrhage.   3. Chronic right sphenoid sinus disease.      CT-HEAD W/O   Final Result      1.  Hydrocephalus with right frontal ventriculostomy tube in place. Ventricular size is similar to the prior exam.      2.  No acute intracranial hemorrhage.         DX-CHEST-PORTABLE (1 VIEW)   Final Result         1. No significant interval change.      CT-TSPINE W/O PLUS RECONS   Final Result      Degenerative changes of the thoracic spine without acute osseous abnormality.      CT-LSPINE W/O PLUS RECONS   Final Result      1.  No lumbar spine fracture.   2.  Mild lower lumbar spine degenerative changes with associated minimal  retrolisthesis at L3-4 and L4-5.      CT-HEAD W/O   Final Result      1.  Hydrocephalus with ventriculostomy in place.   2.  No acute intracranial hemorrhage or territorial infarct.   3.  Chronic sphenoid sinus disease.      CT-MAXILLOFACIAL W/O PLUS RECONS   Final Result      1.  No facial fracture.   2.  Chronic sphenoid sinus disease.      CT-CSPINE WITHOUT PLUS RECONS   Final Result      Degenerative postsurgical changes of the cervical spine without acute osseous abnormality.      CT-CHEST,ABDOMEN,PELVIS WITH   Final Result      1.  No evidence for acute intrathoracic injury.   2.  No evidence for acute intra-abdominal trauma.         DX-CHEST-LIMITED (1 VIEW)   Final Result      1.  No acute cardiopulmonary disease.   2.  Endotracheal tube terminates below the level of the thoracic inlet.   3.  Enteric feeding tube terminates in the left upper quadrant.      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  Was admitted few days ago after the seizure falling from a ladder.  Has been having daily seizures while admitted.  Yesterday we increased the Keppra and the Topamax.  His home medications are Depakote and the low-dose Topamax.  Yesterday after the seizure in the morning he was back to his baseline state.  Therefore I have low concern for an ongoing infection or other secondary causes for the increase seizures.  Not sure was causing the increase now.  Looks like his baseline is 1 seizure a week.    Update 7/7  Patient continues to have multiple seizures despite initiation and escalation of multiple AEDs.  The nurses told me overnight he would do this bilateral arm tremoring.  Sometimes he is responding to them during the events.  He is postictal afterwards but usually gets Ativan for these events also.  Still odd to have this escalation of seizures with no obvious  cause so far and being resistant to multiple medications.  Repeat imaging has been unremarkable.  We may need to consider MRI brain.  And lumbar puncture.  However in the meantime we will get a continuous EEG to confirm seizure and the focus.      Update 7/8  We will continue EEG for now.  Make sure he truly is stable.  Continue the AEDs unchanged for now.    Update 7/9  Given yesterday event appears to be nonepileptic.  Will de-escalate medications today.  Stop the Vimpat.  Decrease the Keppra.  I restarted the Topamax.  Not because of seizure prophylaxis but because Topamax is known to be a behavior mood stabilizer.  Along with the Depakote.    Update 7/10  Patient had another nonepileptic event yesterday afternoon. After which EEG was discontinued due to patient continually pulling off the leads and having skin breakdown of the scalp. 2 more events overnight. I found out this morning patient has another chart. Appears he was diagnosed with nonepileptic events with epileptic events years ago. Follows Dr. Bloch.      Plan:  1. DC EEG  2.  Continue the Depakote 500 mg twice daily  3. DC the Keppra   4. Continue Topamax 100 mg twice daily  5.  Seizure precautions  6.  Avoid the as needed Ativan for now.  Call if seizure activity persists longer than 5 to 10 minutes.  7.  Discontinue Vimpat 200 mg twice daily    Neurology will sign off. Patient can follow with Dr. Bloch outpatient.                The evaluation of the patient, and recommended management, was discussed with the resident staff. I have performed a physical exam and reviewed and updated ROS and Plan today (7/10/2021). In review of yesterday's note (7/9/2021), there are no changes except as documented above.    This chart was partially generated using voice recognition technology and sound alike word replacement may be present, best efforts were made to make the chart accurate.    Lucio Alex MD  Board Certified Neurology, ABPN  (t) [phone]

## 2021-07-10 NOTE — CARE PLAN
The patient is Stable - Low risk of patient condition declining or worsening    Shift Goals  Clinical Goals: Safety, improve mentation   Patient Goals:   Family Goals:      Progress made toward(s) clinical / shift goals: No witnessed seizure like activity today. Patient alert and involved in conversation and assessments today.     Patient is not progressing towards the following goals: N/A

## 2021-07-10 NOTE — PROGRESS NOTES
Patient had 2 instances of seizure like activity with arm, leg and head jerky movements. Second instance he had one emesis. Post-ictal on first was a head ache and on the second he was minimally responsive with stable vital signs. About 15 minutes after he was responsive and then fell asleep.

## 2021-07-10 NOTE — PROGRESS NOTES
Hospital Medicine Daily Progress Note    Date of Service  7/10/2021    Chief Complaint  Respiratory failure and Seizure after fall from ladder    Hospital Course  Eriberto Young is a 66 y.o. male with a hx of migraine headaches, dementia, Parkinson, traumatic brain injury 2006 with subsequent complex partial seizures, prior  shunt for hydrocephalus admitted 7/2/2021 with trauma from fall of a 10 ft ladder and a witnessed seizure after the fall.  He was intubated for airway protection in the field. He has continued to have recurrent seizures while hospitalized and neurology was consulted. His AED regimen has been adjusted and patient was placed on continuous EEG on 7/7.  He had 1 nonepileptic event on 7/8 after which neurology has been making adjustments to his AED and reducing doses. He had an additional JEFFERSON on 7/9. Appears pt was diagnosed with non epileptic seizures by his primary neurologist, Dr. Bloch, years ago.     Interval Problem Update  Seizure like activity x2 over night  Mental status is markedly improved today.  Patient answering questions appropriately.  We will continue with diet    I have personally seen and examined the patient at bedside. I discussed the plan of care with patient, bedside RN and .    Consultants/Specialty  critical care, neurology and trauma surgery    Code Status  Full Code    Disposition  Patient is medically cleared pending PT, OT, speech.   Anticipate discharge to to be determined.  I have placed the appropriate orders for post-discharge needs.    Review of Systems  Review of Systems   Constitutional: Negative for chills and fever.   Respiratory: Negative for cough and shortness of breath.    Cardiovascular: Negative for chest pain and palpitations.   Gastrointestinal: Negative for abdominal pain, nausea and vomiting.   Genitourinary: Negative for dysuria and urgency.   Neurological: Negative for dizziness and headaches.   All other systems reviewed and are  negative.       Physical Exam  Temp:  [36.3 °C (97.3 °F)-36.4 °C (97.5 °F)] 36.3 °C (97.4 °F)  Pulse:  [60-81] 60  Resp:  [17-21] 17  BP: (125-143)/(73-98) 125/75  SpO2:  [94 %-97 %] 96 %    Physical Exam  Vitals and nursing note reviewed.   Constitutional:       Appearance: Normal appearance.   Cardiovascular:      Rate and Rhythm: Normal rate and regular rhythm.      Heart sounds: No murmur heard.     Pulmonary:      Effort: Pulmonary effort is normal. No respiratory distress.      Breath sounds: Normal breath sounds.   Neurological:      Mental Status: He is alert.      Comments: More alert and able to follow commands today         Fluids    Intake/Output Summary (Last 24 hours) at 7/10/2021 1029  Last data filed at 7/10/2021 0900  Gross per 24 hour   Intake 830 ml   Output 900 ml   Net -70 ml       Laboratory      Recent Labs     07/09/21  1756   SODIUM 141   POTASSIUM 3.6   CHLORIDE 106   CO2 23   GLUCOSE 103*   BUN 5*   CREATININE 0.67   CALCIUM 9.2                   Imaging  US-EXTREMITY VENOUS UPPER UNILAT LEFT   Final Result      CT-HEAD W/O   Final Result         1. Stable course of the right frontal ventriculostomy catheter, with no significant change in symmetric bilateral ventricular dilation.   2. No calvarial fracture or acute intracranial hemorrhage.   3. Chronic right sphenoid sinus disease.      CT-HEAD W/O   Final Result      1.  Hydrocephalus with right frontal ventriculostomy tube in place. Ventricular size is similar to the prior exam.      2.  No acute intracranial hemorrhage.         DX-CHEST-PORTABLE (1 VIEW)   Final Result         1. No significant interval change.      CT-TSPINE W/O PLUS RECONS   Final Result      Degenerative changes of the thoracic spine without acute osseous abnormality.      CT-LSPINE W/O PLUS RECONS   Final Result      1.  No lumbar spine fracture.   2.  Mild lower lumbar spine degenerative changes with associated minimal retrolisthesis at L3-4 and L4-5.      CT-HEAD  W/O   Final Result      1.  Hydrocephalus with ventriculostomy in place.   2.  No acute intracranial hemorrhage or territorial infarct.   3.  Chronic sphenoid sinus disease.      CT-MAXILLOFACIAL W/O PLUS RECONS   Final Result      1.  No facial fracture.   2.  Chronic sphenoid sinus disease.      CT-CSPINE WITHOUT PLUS RECONS   Final Result      Degenerative postsurgical changes of the cervical spine without acute osseous abnormality.      CT-CHEST,ABDOMEN,PELVIS WITH   Final Result      1.  No evidence for acute intrathoracic injury.   2.  No evidence for acute intra-abdominal trauma.         DX-CHEST-LIMITED (1 VIEW)   Final Result      1.  No acute cardiopulmonary disease.   2.  Endotracheal tube terminates below the level of the thoracic inlet.   3.  Enteric feeding tube terminates in the left upper quadrant.      US-ABORTED US PROCEDURE    (Results Pending)        Assessment/Plan  * Psychogenic nonepileptic seizure- (present on admission)  Assessment & Plan  Post traumatic seizure with decreased LOC requiring intubation  Keppra 1500mg IV as of , cont home depakote 500mg BID, home topamax held on   Seizure precautions, IV ativan 4mg PRN for seizure activity  Continuous EEG per neuro-  appears to be nonepileptic.  Per chart review with patient's primary neurologist Dr. Borrero patient has history of nonepileptic seizures  1. DC EEG  2.  Continue the Depakote 500 mg twice daily  3. DC the Keppra   4. Continue Topamax 100 mg twice daily  5.  Seizure precautions  6.  Avoid the as needed Ativan for now.  Call if seizure activity persists longer than 5 to 10 minutes.  7.  Discontinue Vimpat 200 mg twice daily       Swelling of limb, left  Assessment & Plan  Left forearm swelling  DVT us negative    Hypomagnesemia  Assessment & Plan  Goal Mg level >2   M.8 and given 2g MgSO4    Obesity (BMI 30.0-34.9)- (present on admission)  Assessment & Plan  Body mass index is 28.63 kg/m².    Parkinson disease (HCC)-  (present on admission)  Assessment & Plan  History of  He has not tolerated Sinemet in the past    Respiratory failure following trauma ([facility])- (present on admission)  Assessment & Plan  He was intubated prior to arrival due to decreased level of consciousness  Extubated 7/4.  Encourage incentive spirometry and deep breathing efforts  Mobilize    Dementia ([facility])- (present on admission)  Assessment & Plan  History of  Followed by Dr. Bloch  He is on Namenda      Hydrocephalus ([facility])- (present on admission)  Assessment & Plan  History of  shunt 2011  Neurology consulting    Trauma- (present on admission)  Assessment & Plan  Fall from ladder with head trauma  Extensive trauma imaging in the ER was negative  Dr. Danielle, trauma surgery, was consulted and request medical team to take over care  He has been cleared for removal of the hard collar       VTE prophylaxis: enoxaparin ppx    I have performed a physical exam and reviewed and updated ROS and Plan today (7/10/2021).

## 2021-07-10 NOTE — THERAPY
"Speech Language Pathology  Daily Treatment     Patient Name: Eriberto Young  Age:  66 y.o., Sex:  male  Medical Record #: [id number]  Today's Date: 7/10/2021     Precautions  Precautions: (P) Fall Risk, Swallow Precautions ( See Comments)  Comments: SEIZURES    Assessment    Pt seen this date as high follow-up given seizure activity during previous SLP session 7/9. RN reporting no difficulty with PO this date, but limited PO intake. Pt resting upon entrance to room but agreeable to therapy and with mod cues was able to reposition himself. PO trials of MTL, puree and soft and bite size assessed. Hyolaryngeal elevation palpated as complete, timely initiation of swallow appreciated and vocal quality remained clear. Pt demonstrated ability to self-feed all trials. Pt demonstrated functional mastication with no oral residue appreciated with trials of soft and bite sized. No s/sx of aspiration appreciated with any consistencies consumed. RUE tremors increased with progression of session, RN aware.       SLP following.     Plan  1. Recommend continuation of soft and bite sized/mildly thick liquids with adherence to the following: upright at 90* for PO, direct supervision with PO, feeding assistance as necessary, assistance with mealtray setup, slow rate, small bites/sips, no straws.   2. Meds crushed in puree.  3. Please place orders for cognitive-linguistic evaluation.     Continue current treatment plan.    Discharge Recommendations: (P) Recommend post-acute placement for additional speech therapy services prior to discharge home    Subjective    Pt awake, oriented to self and city. Pt followed directions and participated fully. Pt able to determine month, day and year with logical cues. After 3 minute delay pt able to recall month and day with continued confusion to year. Of note, pt demonstrated strong self-advocacy skills stating \"You'll have to bear with me, I have had two traumatic brain injuries, memory is not my " "strength\".     Objective       07/10/21 1505   Dysphagia    Dysphagia X   Positioning / Behavior Modification Self Monitoring;Modulate Rate or Bite Size;Multiple Swallows  (direct sup, feed A as needed)   Other Treatments PO trials MTL, puree, SB6   Diet / Liquid Recommendation Soft & Bite-Sized (6) - (Dysphagia III);Mildly Thick (2) - (Nectar Thick)   Nutritional Liquid Intake Rating Scale Thickened beverages (mildly thick unless otherwise specified)   Nutritional Food Intake Rating Scale Total oral diet with multiple consistencies but requiring special preparation or compensations   Nursing Communication Swallow Precaution Sign Posted at Head of Bed   Skilled Intervention Compensatory Strategies;Verbal Cueing   Recommended Route of Medication Administration   Medication Administration  Crush all Medications in Puree   Patient / Family Goals   Patient / Family Goal #1 \" Can I have water?\"   Goal #1 Outcome Progressing slower than expected   Short Term Goals   Short Term Goal # 1 The patient will utilize swallow strategies during SB6/MT2 meal items with no overt s/s of aspiration given min A   Goal Outcome # 1 Progressing as expected         "

## 2021-07-11 PROCEDURE — 700111 HCHG RX REV CODE 636 W/ 250 OVERRIDE (IP): Performed by: PSYCHIATRY & NEUROLOGY

## 2021-07-11 PROCEDURE — A9270 NON-COVERED ITEM OR SERVICE: HCPCS | Performed by: INTERNAL MEDICINE

## 2021-07-11 PROCEDURE — 700111 HCHG RX REV CODE 636 W/ 250 OVERRIDE (IP): Performed by: INTERNAL MEDICINE

## 2021-07-11 PROCEDURE — 770006 HCHG ROOM/CARE - MED/SURG/GYN SEMI*

## 2021-07-11 PROCEDURE — 700102 HCHG RX REV CODE 250 W/ 637 OVERRIDE(OP): Performed by: INTERNAL MEDICINE

## 2021-07-11 PROCEDURE — 99232 SBSQ HOSP IP/OBS MODERATE 35: CPT | Performed by: HOSPITALIST

## 2021-07-11 PROCEDURE — 700101 HCHG RX REV CODE 250: Performed by: PSYCHIATRY & NEUROLOGY

## 2021-07-11 PROCEDURE — 700105 HCHG RX REV CODE 258: Performed by: STUDENT IN AN ORGANIZED HEALTH CARE EDUCATION/TRAINING PROGRAM

## 2021-07-11 PROCEDURE — 700111 HCHG RX REV CODE 636 W/ 250 OVERRIDE (IP): Performed by: HOSPITALIST

## 2021-07-11 PROCEDURE — 700111 HCHG RX REV CODE 636 W/ 250 OVERRIDE (IP): Performed by: STUDENT IN AN ORGANIZED HEALTH CARE EDUCATION/TRAINING PROGRAM

## 2021-07-11 RX ADMIN — ACETAMINOPHEN 650 MG: 325 TABLET, FILM COATED ORAL at 21:32

## 2021-07-11 RX ADMIN — ONDANSETRON 4 MG: 4 TABLET, ORALLY DISINTEGRATING ORAL at 16:31

## 2021-07-11 RX ADMIN — TOPIRAMATE 100 MG: 100 TABLET, FILM COATED ORAL at 05:39

## 2021-07-11 RX ADMIN — DEXTROSE MONOHYDRATE 500 MG: 50 INJECTION, SOLUTION INTRAVENOUS at 18:48

## 2021-07-11 RX ADMIN — DOCUSATE SODIUM 50 MG AND SENNOSIDES 8.6 MG 2 TABLET: 8.6; 5 TABLET, FILM COATED ORAL at 05:39

## 2021-07-11 RX ADMIN — DULOXETINE HYDROCHLORIDE 30 MG: 30 CAPSULE, DELAYED RELEASE ORAL at 18:31

## 2021-07-11 RX ADMIN — LEVETIRACETAM INJECTION 500 MG: 5 INJECTION INTRAVENOUS at 18:31

## 2021-07-11 RX ADMIN — KETOROLAC TROMETHAMINE 30 MG: 30 INJECTION, SOLUTION INTRAMUSCULAR; INTRAVENOUS at 13:25

## 2021-07-11 RX ADMIN — ENOXAPARIN SODIUM 40 MG: 40 INJECTION SUBCUTANEOUS at 05:40

## 2021-07-11 RX ADMIN — TOPIRAMATE 100 MG: 100 TABLET, FILM COATED ORAL at 18:31

## 2021-07-11 RX ADMIN — LEVETIRACETAM INJECTION 500 MG: 5 INJECTION INTRAVENOUS at 05:39

## 2021-07-11 RX ADMIN — DEXTROSE MONOHYDRATE 500 MG: 50 INJECTION, SOLUTION INTRAVENOUS at 06:45

## 2021-07-11 ASSESSMENT — FIBROSIS 4 INDEX: FIB4 SCORE: 1.65

## 2021-07-11 ASSESSMENT — PAIN DESCRIPTION - PAIN TYPE
TYPE: ACUTE PAIN

## 2021-07-11 ASSESSMENT — ENCOUNTER SYMPTOMS
CHILLS: 0
FEVER: 0
DIZZINESS: 0
COUGH: 0
PALPITATIONS: 0
SHORTNESS OF BREATH: 0
HEADACHES: 0
ABDOMINAL PAIN: 0
NAUSEA: 0
VOMITING: 0

## 2021-07-11 NOTE — CARE PLAN
The patient is Stable - Low risk of patient condition declining or worsening    Shift Goals  Clinical Goals: safety/imporve mentation  Patient Goals: sleep  Family Goals:  (No family present)    Progress made toward(s) clinical / shift goals: Patient was educated on plan of care, medications, pain and fall risk. Patient has all fall precautions in place and has remained free from falls. Patient has remained free from restraints and was educated on how to continue to remain restraint free.    Problem: Knowledge Deficit - Standard  Goal: Patient and family/care givers will demonstrate understanding of plan of care, disease process/condition, diagnostic tests and medications  Outcome: Progressing     Problem: Fall Risk  Goal: Patient will remain free from falls  Outcome: Progressing     Problem: Safety - Medical Restraint  Goal: Free from restraint(s) (Restraint for Interference with Medical Device)  Outcome: Progressing       Patient is not progressing towards the following goals:

## 2021-07-11 NOTE — CARE PLAN
Problem: Pain - Standard  Goal: Alleviation of pain or a reduction in pain to the patient’s comfort goal  Outcome: Progressing     Problem: Fall Risk  Goal: Patient will remain free from falls  Outcome: Progressing     Problem: Skin Integrity  Goal: Skin integrity is maintained or improved  Outcome: Progressing     Problem: Knowledge Deficit - Standard  Goal: Patient and family/care givers will demonstrate understanding of plan of care, disease process/condition, diagnostic tests and medications  Outcome: Progressing   The patient is Stable - Low risk of patient condition declining or worsening    Shift Goals  Clinical Goals: Maintain safety, stable neuro status, mobilize patient  Patient Goals: Rest  Family Goals:  (No family present)    Progress made toward(s) clinical / shift goals: Safety maintained, pt remained A/0 x 4 today. Patient walked with spouse and RN around the unit with standby assist and FWW. Tolerated well.     Spouse voices they do have stairs at home, but they have a bedroom and bathroom on the first floor and that she works from home. Per MD, possible discharge tomorrow after clearance from therapy.     Patient is not progressing towards the following goals:

## 2021-07-11 NOTE — PROGRESS NOTES
Hospital Medicine Daily Progress Note    Date of Service  7/11/2021    Chief Complaint  Respiratory failure and Seizure after fall from ladder    Hospital Course  Eriberto Young is a 66 y.o. male with a hx of migraine headaches, dementia, Parkinson, traumatic brain injury 2006 with subsequent complex partial seizures, prior  shunt for hydrocephalus admitted 7/2/2021 with trauma from fall of a 10 ft ladder and a witnessed seizure after the fall.  He was intubated for airway protection in the field. He has continued to have recurrent seizures while hospitalized and neurology was consulted. His AED regimen has been adjusted and patient was placed on continuous EEG on 7/7.  He had 1 nonepileptic event on 7/8 after which neurology has been making adjustments to his AED and reducing doses. He had an additional JEFFERSON on 7/9. Appears pt was diagnosed with non epileptic seizures by his primary neurologist, Dr. Bloch, years ago.     Interval Problem Update  Started on diet, cog eval pending  His mentation today remains improved  Poss dc home tomorrow    I have personally seen and examined the patient at bedside. I discussed the plan of care with patient, bedside RN and .    Consultants/Specialty  critical care, neurology and trauma surgery    Code Status  Full Code    Disposition  Patient is medically cleared pending PT, OT, speech.   Anticipate discharge to to be determined.  I have placed the appropriate orders for post-discharge needs.    Review of Systems  Review of Systems   Constitutional: Negative for chills and fever.   Respiratory: Negative for cough and shortness of breath.    Cardiovascular: Negative for chest pain and palpitations.   Gastrointestinal: Negative for abdominal pain, nausea and vomiting.   Genitourinary: Negative for dysuria and urgency.   Neurological: Negative for dizziness and headaches.   All other systems reviewed and are negative.       Physical Exam  Temp:  [36.4 °C (97.6 °F)-36.7  °C (98 °F)] 36.4 °C (97.6 °F)  Pulse:  [60-75] 60  Resp:  [16] 16  BP: (123-138)/(88-93) 123/93  SpO2:  [93 %-95 %] 93 %    Physical Exam  Vitals and nursing note reviewed.   Constitutional:       Appearance: Normal appearance.   Cardiovascular:      Rate and Rhythm: Normal rate and regular rhythm.      Heart sounds: No murmur heard.     Pulmonary:      Effort: Pulmonary effort is normal. No respiratory distress.      Breath sounds: Normal breath sounds.   Neurological:      Mental Status: He is alert.      Comments: More alert and able to follow commands today         Fluids    Intake/Output Summary (Last 24 hours) at 7/11/2021 0900  Last data filed at 7/11/2021 0400  Gross per 24 hour   Intake 880 ml   Output 1150 ml   Net -270 ml       Laboratory      Recent Labs     07/09/21  1756   SODIUM 141   POTASSIUM 3.6   CHLORIDE 106   CO2 23   GLUCOSE 103*   BUN 5*   CREATININE 0.67   CALCIUM 9.2                   Imaging  US-EXTREMITY VENOUS UPPER UNILAT LEFT   Final Result      CT-HEAD W/O   Final Result         1. Stable course of the right frontal ventriculostomy catheter, with no significant change in symmetric bilateral ventricular dilation.   2. No calvarial fracture or acute intracranial hemorrhage.   3. Chronic right sphenoid sinus disease.      CT-HEAD W/O   Final Result      1.  Hydrocephalus with right frontal ventriculostomy tube in place. Ventricular size is similar to the prior exam.      2.  No acute intracranial hemorrhage.         DX-CHEST-PORTABLE (1 VIEW)   Final Result         1. No significant interval change.      CT-TSPINE W/O PLUS RECONS   Final Result      Degenerative changes of the thoracic spine without acute osseous abnormality.      CT-LSPINE W/O PLUS RECONS   Final Result      1.  No lumbar spine fracture.   2.  Mild lower lumbar spine degenerative changes with associated minimal retrolisthesis at L3-4 and L4-5.      CT-HEAD W/O   Final Result      1.  Hydrocephalus with ventriculostomy in  place.   2.  No acute intracranial hemorrhage or territorial infarct.   3.  Chronic sphenoid sinus disease.      CT-MAXILLOFACIAL W/O PLUS RECONS   Final Result      1.  No facial fracture.   2.  Chronic sphenoid sinus disease.      CT-CSPINE WITHOUT PLUS RECONS   Final Result      Degenerative postsurgical changes of the cervical spine without acute osseous abnormality.      CT-CHEST,ABDOMEN,PELVIS WITH   Final Result      1.  No evidence for acute intrathoracic injury.   2.  No evidence for acute intra-abdominal trauma.         DX-CHEST-LIMITED (1 VIEW)   Final Result      1.  No acute cardiopulmonary disease.   2.  Endotracheal tube terminates below the level of the thoracic inlet.   3.  Enteric feeding tube terminates in the left upper quadrant.      US-ABORTED US PROCEDURE    (Results Pending)        Assessment/Plan  * Psychogenic nonepileptic seizure- (present on admission)  Assessment & Plan  Post traumatic seizure with decreased LOC requiring intubation  Keppra 1500mg IV as of , cont home depakote 500mg BID, home topamax held on   Seizure precautions, IV ativan 4mg PRN for seizure activity  Continuous EEG per neuro-  appears to be nonepileptic.  Per chart review with patient's primary neurologist Dr. Borrero patient has history of nonepileptic seizures  1. DC EEG  2.  Continue the Depakote 500 mg twice daily  3. DC the Keppra   4. Continue Topamax 100 mg twice daily  5.  Seizure precautions  6.  Avoid the as needed Ativan for now.  Call if seizure activity persists longer than 5 to 10 minutes.  7.  Discontinue Vimpat 200 mg twice daily       Swelling of limb, left  Assessment & Plan  Left forearm swelling  DVT us negative    Hypomagnesemia  Assessment & Plan  Goal Mg level >2  /5 M.8 and given 2g MgSO4    Obesity (BMI 30.0-34.9)- (present on admission)  Assessment & Plan  Body mass index is 28.63 kg/m².    Parkinson disease (HCC)- (present on admission)  Assessment & Plan  History of  He has not  tolerated Sinemet in the past    Respiratory failure following trauma (HCC)- (present on admission)  Assessment & Plan  He was intubated prior to arrival due to decreased level of consciousness  Extubated 7/4.  Encourage incentive spirometry and deep breathing efforts  Mobilize    Dementia (HCC)- (present on admission)  Assessment & Plan  History of  Followed by Dr. Bloch  He is on Namenda      Hydrocephalus (HCC)- (present on admission)  Assessment & Plan  History of  shunt 2011  Neurology consulting    Trauma- (present on admission)  Assessment & Plan  Fall from ladder with head trauma  Extensive trauma imaging in the ER was negative  Dr. Danielle, trauma surgery, was consulted and request medical team to take over care  He has been cleared for removal of the hard collar       VTE prophylaxis: enoxaparin ppx    I have performed a physical exam and reviewed and updated ROS and Plan today (7/11/2021).

## 2021-07-12 VITALS
DIASTOLIC BLOOD PRESSURE: 79 MMHG | OXYGEN SATURATION: 93 % | WEIGHT: 194.89 LBS | TEMPERATURE: 97.2 F | BODY MASS INDEX: 27.9 KG/M2 | HEART RATE: 64 BPM | RESPIRATION RATE: 16 BRPM | SYSTOLIC BLOOD PRESSURE: 133 MMHG | HEIGHT: 70 IN

## 2021-07-12 LAB
ALBUMIN SERPL BCP-MCNC: 4.1 G/DL (ref 3.2–4.9)
ALBUMIN/GLOB SERPL: 1.6 G/DL
ALP SERPL-CCNC: 52 U/L (ref 30–99)
ALT SERPL-CCNC: 43 U/L (ref 2–50)
ANION GAP SERPL CALC-SCNC: 11 MMOL/L (ref 7–16)
AST SERPL-CCNC: 26 U/L (ref 12–45)
BILIRUB SERPL-MCNC: 0.4 MG/DL (ref 0.1–1.5)
BUN SERPL-MCNC: 11 MG/DL (ref 8–22)
CALCIUM SERPL-MCNC: 9.4 MG/DL (ref 8.5–10.5)
CHLORIDE SERPL-SCNC: 107 MMOL/L (ref 96–112)
CO2 SERPL-SCNC: 20 MMOL/L (ref 20–33)
CREAT SERPL-MCNC: 0.8 MG/DL (ref 0.5–1.4)
GLOBULIN SER CALC-MCNC: 2.6 G/DL (ref 1.9–3.5)
GLUCOSE SERPL-MCNC: 112 MG/DL (ref 65–99)
POTASSIUM SERPL-SCNC: 3.9 MMOL/L (ref 3.6–5.5)
PROT SERPL-MCNC: 6.7 G/DL (ref 6–8.2)
SODIUM SERPL-SCNC: 138 MMOL/L (ref 135–145)

## 2021-07-12 PROCEDURE — 700102 HCHG RX REV CODE 250 W/ 637 OVERRIDE(OP): Performed by: INTERNAL MEDICINE

## 2021-07-12 PROCEDURE — 99239 HOSP IP/OBS DSCHRG MGMT >30: CPT | Performed by: HOSPITALIST

## 2021-07-12 PROCEDURE — 700105 HCHG RX REV CODE 258: Performed by: STUDENT IN AN ORGANIZED HEALTH CARE EDUCATION/TRAINING PROGRAM

## 2021-07-12 PROCEDURE — A9270 NON-COVERED ITEM OR SERVICE: HCPCS | Performed by: INTERNAL MEDICINE

## 2021-07-12 PROCEDURE — A9270 NON-COVERED ITEM OR SERVICE: HCPCS | Performed by: HOSPITALIST

## 2021-07-12 PROCEDURE — 97535 SELF CARE MNGMENT TRAINING: CPT

## 2021-07-12 PROCEDURE — 92526 ORAL FUNCTION THERAPY: CPT

## 2021-07-12 PROCEDURE — 700101 HCHG RX REV CODE 250: Performed by: PSYCHIATRY & NEUROLOGY

## 2021-07-12 PROCEDURE — 700111 HCHG RX REV CODE 636 W/ 250 OVERRIDE (IP): Performed by: STUDENT IN AN ORGANIZED HEALTH CARE EDUCATION/TRAINING PROGRAM

## 2021-07-12 PROCEDURE — 700102 HCHG RX REV CODE 250 W/ 637 OVERRIDE(OP): Performed by: HOSPITALIST

## 2021-07-12 PROCEDURE — 92523 SPEECH SOUND LANG COMPREHEN: CPT

## 2021-07-12 PROCEDURE — 700111 HCHG RX REV CODE 636 W/ 250 OVERRIDE (IP): Performed by: PSYCHIATRY & NEUROLOGY

## 2021-07-12 PROCEDURE — 700111 HCHG RX REV CODE 636 W/ 250 OVERRIDE (IP): Performed by: INTERNAL MEDICINE

## 2021-07-12 PROCEDURE — 36415 COLL VENOUS BLD VENIPUNCTURE: CPT

## 2021-07-12 PROCEDURE — 80053 COMPREHEN METABOLIC PANEL: CPT

## 2021-07-12 PROCEDURE — 97116 GAIT TRAINING THERAPY: CPT

## 2021-07-12 RX ORDER — TOPIRAMATE 100 MG/1
100 TABLET, FILM COATED ORAL 2 TIMES DAILY
Qty: 60 TABLET | Refills: 0 | Status: SHIPPED | OUTPATIENT
Start: 2021-07-12 | End: 2021-08-10

## 2021-07-12 RX ORDER — LOPERAMIDE HYDROCHLORIDE 2 MG/1
2 CAPSULE ORAL 4 TIMES DAILY PRN
Status: DISCONTINUED | OUTPATIENT
Start: 2021-07-12 | End: 2021-07-12 | Stop reason: HOSPADM

## 2021-07-12 RX ORDER — LOPERAMIDE HYDROCHLORIDE 2 MG/1
2 CAPSULE ORAL 4 TIMES DAILY PRN
Qty: 10 CAPSULE | Refills: 0 | Status: SHIPPED | OUTPATIENT
Start: 2021-07-12

## 2021-07-12 RX ADMIN — TOPIRAMATE 100 MG: 100 TABLET, FILM COATED ORAL at 05:27

## 2021-07-12 RX ADMIN — DEXTROSE MONOHYDRATE 500 MG: 50 INJECTION, SOLUTION INTRAVENOUS at 06:20

## 2021-07-12 RX ADMIN — LEVETIRACETAM INJECTION 500 MG: 5 INJECTION INTRAVENOUS at 05:24

## 2021-07-12 RX ADMIN — DOCUSATE SODIUM 50 MG AND SENNOSIDES 8.6 MG 2 TABLET: 8.6; 5 TABLET, FILM COATED ORAL at 05:24

## 2021-07-12 RX ADMIN — LOPERAMIDE HYDROCHLORIDE 2 MG: 2 CAPSULE ORAL at 12:07

## 2021-07-12 RX ADMIN — ENOXAPARIN SODIUM 40 MG: 40 INJECTION SUBCUTANEOUS at 05:24

## 2021-07-12 RX ADMIN — ACETAMINOPHEN 650 MG: 325 TABLET, FILM COATED ORAL at 05:24

## 2021-07-12 ASSESSMENT — COGNITIVE AND FUNCTIONAL STATUS - GENERAL
DAILY ACTIVITIY SCORE: 21
SUGGESTED CMS G CODE MODIFIER DAILY ACTIVITY: CJ
MOBILITY SCORE: 18
TOILETING: A LITTLE
SUGGESTED CMS G CODE MODIFIER MOBILITY: CK
HELP NEEDED FOR BATHING: A LITTLE
WALKING IN HOSPITAL ROOM: A LITTLE
STANDING UP FROM CHAIR USING ARMS: A LITTLE
CLIMB 3 TO 5 STEPS WITH RAILING: A LITTLE
TURNING FROM BACK TO SIDE WHILE IN FLAT BAD: A LITTLE
MOVING TO AND FROM BED TO CHAIR: A LITTLE
DRESSING REGULAR LOWER BODY CLOTHING: A LITTLE
MOVING FROM LYING ON BACK TO SITTING ON SIDE OF FLAT BED: A LITTLE

## 2021-07-12 ASSESSMENT — PAIN DESCRIPTION - PAIN TYPE
TYPE: ACUTE PAIN

## 2021-07-12 ASSESSMENT — GAIT ASSESSMENTS
DEVIATION: NO DEVIATION
DISTANCE (FEET): 400
DISTANCE (FEET): 150
GAIT LEVEL OF ASSIST: SUPERVISED
ASSISTIVE DEVICE: FRONT WHEEL WALKER

## 2021-07-12 NOTE — DISCHARGE PLANNING
Anticipated Discharge Disposition:   Home  Outpatient PT, OT, SLP    Action:    RN MICHELLE spoke with patient and his spouse today.  They live in a 1 1/2 story home.  Pt uses a cane, walker when needed.  He is able to stay on the first level in a guest bedroom.  Pt is able to ambulate 400 ft with FWW and supervision and able to clim 6 steps per PT.  Patient's spouse, Kallie works from home and is able to assist patient and drive him to appointments.  Informed them that is unlikely that home health will accept their insurance.  They were agreeable to outpatient therapies.  Orders obtained from Dr. Fan for outpatient PT, OT and SLP.    Orders faxed to [facility].    Barriers to Discharge:    None    Plan:    F/U with [facility].

## 2021-07-12 NOTE — DISCHARGE INSTRUCTIONS
Discharge Instructions    Discharged to home by car with relative. Discharged via wheelchair, hospital escort: Yes.  Special equipment needed: Not Applicable    Be sure to schedule a follow-up appointment with your primary care doctor or any specialists as instructed.     Discharge Plan:   Diet Plan: Discussed  Activity Level: Discussed  Confirmed Follow up Appointment: Patient to Call and Schedule Appointment  Confirmed Symptoms Management: Discussed  Medication Reconciliation Updated: Yes    I understand that a diet low in cholesterol, fat, and sodium is recommended for good health. Unless I have been given specific instructions below for another diet, I accept this instruction as my diet prescription.   Regular diet, thin liquids are ok.     Special Instructions: None    · Is patient discharged on Warfarin / Coumadin?   No     Depression / Suicide Risk    As you are discharged from this [facility] facility, it is important to learn how to keep safe from harming yourself.    Recognize the warning signs:  · Abrupt changes in personality, positive or negative- including increase in energy   · Giving away possessions  · Change in eating patterns- significant weight changes-  positive or negative  · Change in sleeping patterns- unable to sleep or sleeping all the time   · Unwillingness or inability to communicate  · Depression  · Unusual sadness, discouragement and loneliness  · Talk of wanting to die  · Neglect of personal appearance   · Rebelliousness- reckless behavior  · Withdrawal from people/activities they love  · Confusion- inability to concentrate     If you or a loved one observes any of these behaviors or has concerns about self-harm, here's what you can do:  · Talk about it- your feelings and reasons for harming yourself  · Remove any means that you might use to hurt yourself (examples: pills, rope, extension cords, firearm)  · Get professional help from the community (Mental Health, Substance Abuse,  psychological counseling)  · Do not be alone:Call your Safe Contact- someone whom you trust who will be there for you.  · Call your local CRISIS HOTLINE 544-6873 or 100-313-0656  · Call your local Children's Mobile Crisis Response Team [place] (958) 319-6336 or www.Stockezy  · Call the toll free National Suicide Prevention Hotlines   · National Suicide Prevention Lifeline 783-843-KOAW (3594)  · [place] [facility] Hope Line Network 800-SUICIDE (515-3137)      Fall Prevention in the Home, Adult  Falls can cause injuries. They can happen to people of all ages. There are many things you can do to make your home safe and to help prevent falls. Ask for help when making these changes, if needed.  What actions can I take to prevent falls?  General Instructions  · Use good lighting in all rooms. Replace any light bulbs that burn out.  · Turn on the lights when you go into a dark area. Use night-lights.  · Keep items that you use often in easy-to-reach places. Lower the shelves around your home if necessary.  · Set up your furniture so you have a clear path. Avoid moving your furniture around.  · Do not have throw rugs and other things on the floor that can make you trip.  · Avoid walking on wet floors.  · If any of your floors are uneven, fix them.  · Add color or contrast paint or tape to clearly adrien and help you see:  ? Any grab bars or handrails.  ? First and last steps of stairways.  ? Where the edge of each step is.  · If you use a stepladder:  ? Make sure that it is fully opened. Do not climb a closed stepladder.  ? Make sure that both sides of the stepladder are locked into place.  ? Ask someone to hold the stepladder for you while you use it.  · If there are any pets around you, be aware of where they are.  What can I do in the bathroom?         · Keep the floor dry. Clean up any water that spills onto the floor as soon as it happens.  · Remove soap buildup in the tub or shower regularly.  · Use non-skid mats or  decals on the floor of the tub or shower.  · Attach bath mats securely with double-sided, non-slip rug tape.  · If you need to sit down in the shower, use a plastic, non-slip stool.  · Install grab bars by the toilet and in the tub and shower. Do not use towel bars as grab bars.  What can I do in the bedroom?  · Make sure that you have a light by your bed that is easy to reach.  · Do not use any sheets or blankets that are too big for your bed. They should not hang down onto the floor.  · Have a firm chair that has side arms. You can use this for support while you get dressed.  What can I do in the kitchen?  · Clean up any spills right away.  · If you need to reach something above you, use a strong step stool that has a grab bar.  · Keep electrical cords out of the way.  · Do not use floor polish or wax that makes floors slippery. If you must use wax, use non-skid floor wax.  What can I do with my stairs?  · Do not leave any items on the stairs.  · Make sure that you have a light switch at the top of the stairs and the bottom of the stairs. If you do not have them, ask someone to add them for you.  · Make sure that there are handrails on both sides of the stairs, and use them. Fix handrails that are broken or loose. Make sure that handrails are as long as the stairways.  · Install non-slip stair treads on all stairs in your home.  · Avoid having throw rugs at the top or bottom of the stairs. If you do have throw rugs, attach them to the floor with carpet tape.  · Choose a carpet that does not hide the edge of the steps on the stairway.  · Check any carpeting to make sure that it is firmly attached to the stairs. Fix any carpet that is loose or worn.  What can I do on the outside of my home?  · Use bright outdoor lighting.  · Regularly fix the edges of walkways and driveways and fix any cracks.  · Remove anything that might make you trip as you walk through a door, such as a raised step or threshold.  · Trim any  bushes or trees on the path to your home.  · Regularly check to see if handrails are loose or broken. Make sure that both sides of any steps have handrails.  · Install guardrails along the edges of any raised decks and porches.  · Clear walking paths of anything that might make someone trip, such as tools or rocks.  · Have any leaves, snow, or ice cleared regularly.  · Use sand or salt on walking paths during winter.  · Clean up any spills in your garage right away. This includes grease or oil spills.  What other actions can I take?  · Wear shoes that:  ? Have a low heel. Do not wear high heels.  ? Have rubber bottoms.  ? Are comfortable and fit you well.  ? Are closed at the toe. Do not wear open-toe sandals.  · Use tools that help you move around (mobility aids) if they are needed. These include:  ? Canes.  ? Walkers.  ? Scooters.  ? Crutches.  · Review your medicines with your doctor. Some medicines can make you feel dizzy. This can increase your chance of falling.  Ask your doctor what other things you can do to help prevent falls.  Where to find more information  · Centers for Disease Control and Prevention, STEAD[place]: https://cdc.gov  · National [place] on Aging: https://ox4sqfd.lalo.nih.gov  Contact a doctor if:  · You are afraid of falling at home.  · You feel weak, drowsy, or dizzy at home.  · You fall at home.  Summary  · There are many simple things that you can do to make your home safe and to help prevent falls.  · Ways to make your home safe include removing tripping hazards and installing grab bars in the bathroom.  · Ask for help when making these changes in your home.  This information is not intended to replace advice given to you by your health care provider. Make sure you discuss any questions you have with your health care provider.  Document Released: 10/14/2010 Document Revised: 04/09/2020 Document Reviewed: 08/02/2018  Elsevier Patient Education © 2020 [employer]vier Inc[employer]

## 2021-07-12 NOTE — THERAPY
Speech Language Pathology   Initial Assessment     Patient Name: Eriberto Young  AGE:  66 y.o., SEX:  male  Medical Record #: [id number]  Today's Date: 7/12/2021     Assessment   The patient was seen for cognitive-linguistic evaluation this date following dysphagia therapy. The patient was awake, alert and oriented to self, location, date and reason with independent use of external aids (white board in room for date). The patient stated that he feels cognitively like he has declined in function since admit however, also stated that he feels it is improving. The patient reported a PMHx of TBI with subsequent cognitive deficits however, no longer receives cognitive-therapy as he had progressed to the point it was not longer needed. The patient was given the COGNISTAT with other non-standardized assessments. Testing revealed mild deficits in simple reasoning, thought organization and medication management. Moderate deficits noted in auditory calculations, and complex auditory comprehension tasks. The patient verbalized awareness of these deficits and the impact they could have once medically discharged from this facility. The patient reported his wife is home and able to assist. Patient reported his wife already manages his medications, the finances and does all the driving.     Plan    Recommendations: 1) Recommend intermittent supervision during the day and direct supervision with IADLs (e.g., medication management, financial management, cooking, scheduling, etc.).     Recommend Speech Therapy 3 times per week until therapy goals are met for the following treatments:  Cognitive-Linguistic Training.    Discharge Recommendations: Recommend outpatient speech therapy services    Objective       07/12/21 0910   Verbal Expression   Verbal Expression / Aphasia Eval (WDL) WDL   Verbal Output Automatic Within Functional Limits (6-7)   Verbal Output: Phrases Within Functional Limits (6-7)   Verbal Output Conversation Within  "Functional Limits (6-7)   Verbal Output Functional Within Functional Limits (6-7)   Repetition: Phrases Within Functional Limits (6-7)   Repetition: Sentences Within Functional Limits (6-7)   Naming Within Functional Limits (6-7)   Comments Pt with slow rate of speech but otherwise verbal expression is WFL   Auditory Comprehension   Yes / No Questions: Personal Information Within Functional Limits (6-7)   Yes / No Questions: General Information Within Functional Limits (6-7)   Yes / No Questions: Abstract Within Functional Limits (6-7)   Follows One Unit Commands Within Functional Limits (6-7)   Follows Two Unit Commands Within Functional Limits (6-7)   Follows Three Unit Commands Minimal (4)   Understands Paragraph Within Functional Limits (6-7)   Understands Simple, Structured Conversation  Within Functional Limits (6-7)   Understands Complex Conversation Moderate (3)   Reading Comprehension   Reading Phrases Within Functional Limits (6-7)   Reading Sentences Within Functional Limits (6-7)   Following Written Direction Minimal (4)   Written Expression   Formulates: Sentence Within Functional Limits (6-7)   Dominant Hand Right   Cognitive-Linguistic   Level of Consciousness Alert   Orientation Level Oriented x 4   Sustained Attention Within Functional Limits (6-7)   Short Term Memory Supervision (5)   Simple Reasoning / Problem Solving Minimal (4)   Complex Reasoning  / Problem Solving Minimal (4)   Safety Awareness Supervision (5)   Insight into Deficits Within Functional Limits (6-7)   Executive Functioning / Organization Within Functional Limits (6-7)   Auditory Math Moderate (3)   Medication Management  Minimal (4)   Patient / Family Goals   Patient / Family Goal #1 \" Can I have water?\"   Short Term Goals   Short Term Goal # 2 The patient will complete functional medication management tasks with >90% accuracy given min A.   Short Term Goal # 3 The patient will complete 3-step auditory comprehension tasks with " >80% accuracy given min A

## 2021-07-12 NOTE — FACE TO FACE
Face to Face Supporting Documentation - Home Health    The encounter with this patient was in whole or in part the primary reason for home health admission.    Date of encounter:   Patient:                    MRN:                       YOB: 2019  Eriberto Young  [id number]  1955     Home health to see patient for:  Physical Therapy evaluation and treatment, Occupational therapy evaluation and treatment and Speech Language Pathology evaluation and treatment    Skilled need for:  Exacerbation of Chronic Disease State Nonepileptic seizures    Skilled nursing interventions to include:  Comment: As above    Homebound status evidenced by:  Needs the assistance of another person in order to leave the home. Leaving home requires a considerable and taxing effort. There is a normal inability to leave the home.    Community Physician to provide follow up care: No primary care provider on file.     Optional Interventions? No      I certify the face to face encounter for this home health care referral meets the CMS requirements and the encounter/clinical assessment with the patient was, in whole, or in part, for the medical condition(s) listed above, which is the primary reason for home health care. Based on my clinical findings: the service(s) are medically necessary, support the need for home health care, and the homebound criteria are met.  I certify that this patient has had a face to face encounter by myself.  Timothy Fan M.D. - NPI: [id number]

## 2021-07-12 NOTE — DISCHARGE SUMMARY
Discharge Summary    CHIEF COMPLAINT ON ADMISSION  No chief complaint on file.      Reason for Admission  Trauma Red, fall from ladder with *     Admission Date  7/2/2021    CODE STATUS  Full Code    HPI & HOSPITAL COURSE  Eriberto Young is a 66 y.o. male with a hx of migraine headaches, dementia, Parkinson, traumatic brain injury 2006 with subsequent complex partial seizures, prior  shunt for hydrocephalus admitted 7/2/2021 with trauma from fall of a 10 ft ladder and a witnessed seizure after the fall.  He was intubated for airway protection in the field. He has continued to have recurrent seizures while hospitalized and neurology was consulted. His AED regimen has been adjusted and patient was placed on continuous EEG on 7/7.  He had 1 nonepileptic event on 7/8 after which neurology has been making adjustments to his AED and reducing doses. He had an additional JEFFERSON on 7/9. Appears pt was diagnosed with non epileptic seizures by his primary neurologist, Dr. Bloch, years ago. He has been evaluated by SLP, PT, OT and home health will be arranged.    Therefore, he is discharged in good and stable condition to home with organized home healthcare and close outpatient follow-up.    The patient met 2-midnight criteria for an inpatient stay at the time of discharge.    Discharge Date  7/12/21    FOLLOW UP ITEMS POST DISCHARGE  F/u neuro    DISCHARGE DIAGNOSES  Principal Problem:    Psychogenic nonepileptic seizure POA: Yes  Active Problems:    Trauma POA: Yes    Hydrocephalus (HCC) POA: Yes    Dementia (HCC) POA: Yes    Respiratory failure following trauma (HCC) POA: Yes    Parkinson disease (HCC) POA: Yes    Obesity (BMI 30.0-34.9) POA: Yes    Acute encephalopathy POA: Yes    Hypomagnesemia POA: Unknown    Swelling of limb, left POA: Unknown  Resolved Problems:    * No resolved hospital problems. *      FOLLOW UP  No future appointments.  No follow-up provider specified.    MEDICATIONS ON DISCHARGE     Medication List       START taking these medications      Instructions   loperamide 2 MG Caps  Commonly known as: IMODIUM   Take 1 capsule by mouth 4 times a day as needed for Diarrhea.  Dose: 2 mg        CHANGE how you take these medications      Instructions   topiramate 100 MG Tabs  What changed:   · medication strength  · how much to take  Commonly known as: TOPAMAX   Take 1 tablet by mouth 2 times a day.  Dose: 100 mg        CONTINUE taking these medications      Instructions   BOTOX INJ   Inject  as directed see administration instructions. Every 12 weeks  Next due 7/16/21     butalbital/apap/caffeine -40 mg -40 MG Tabs  Commonly known as: Fioricet   Take 1 tablet by mouth every four hours as needed for Headache.  Dose: 1 tablet     divalproex  MG Tb24  Commonly known as: DEPAKOTE ER   Take 500 mg by mouth 2 times a day.  Dose: 500 mg     DULoxetine 60 MG Cpep delayed-release capsule  Commonly known as: CYMBALTA   Take 60 mg by mouth every evening.  Dose: 60 mg     Emgality 120 MG/ML Soaj  Generic drug: Galcanezumab-gnlm   Inject 1 Each under the skin Q30 DAYS.  Dose: 1 Each     Lovaza 1 GM capsule  Generic drug: omega-3 acid ethyl esters   Take 1 g by mouth 2 times a day.  Dose: 1 g     Memantine HCl ER 28 MG Cp24  Commonly known as: NAMENDA   Take 28 mg by mouth every day.  Dose: 28 mg     Mirabegron ER 50 MG Tb24   Take 1 tablet by mouth every day.  Dose: 1 tablet     pantoprazole 40 MG Tbec  Commonly known as: PROTONIX   Take 40 mg by mouth every day.  Dose: 40 mg     propranolol 20 MG Tabs  Commonly known as: INDERAL   Take 20 mg by mouth every evening.  Dose: 20 mg     therapeutic multivitamin-minerals Tabs   Take 1 tablet by mouth every day.  Dose: 1 tablet     traZODone 50 MG Tabs  Commonly known as: DESYREL   Take 50 mg by mouth at bedtime as needed for Sleep.  Dose: 50 mg            Allergies  Allergies   Allergen Reactions   • Ativan Unspecified     Okay with small amount   • Hydromorphone  Unspecified     Okay with small amount       DIET  Orders Placed This Encounter   Procedures   • Diet Order Diet: Regular     Standing Status:   Standing     Number of Occurrences:   1     Order Specific Question:   Diet:     Answer:   Regular [1]       ACTIVITY  As tolerated.  Weight bearing as tolerated    CONSULTATIONS  neurology    PROCEDURES  EEG    LABORATORY  Lab Results   Component Value Date    SODIUM 138 07/12/2021    POTASSIUM 3.9 07/12/2021    CHLORIDE 107 07/12/2021    CO2 20 07/12/2021    GLUCOSE 112 (H) 07/12/2021    BUN 11 07/12/2021    CREATININE 0.80 07/12/2021        Lab Results   Component Value Date    WBC 9.3 07/07/2021    HEMOGLOBIN 14.3 07/07/2021    HEMATOCRIT 40.9 (L) 07/07/2021    PLATELETCT 178 07/07/2021        Total time of the discharge process exceeds 41 minutes.

## 2021-07-12 NOTE — PROGRESS NOTES
"Patient had episode of jerking movements for approximately 2 minutes. Stating \"I am fine, leave me alone\" during. Once patient became aware Orientation questions were asked patient stated he was \"Umair Foss, in an RV\" he also stated \"grace was hurt I am umair\" when asked about his name. This RN tried reorientation. Patient began putting home clothing on, and stated he wanted to leave. This RN asked him to stay and sleep and he took his home clothing off and got back in bed.  "

## 2021-07-12 NOTE — CARE PLAN
The patient is Stable - Low risk of patient condition declining or worsening    Shift Goals  Clinical Goals: maintain mentation and safety  Patient Goals: rest, walk  Family Goals:  (No family present)    Progress made toward(s) clinical / shift goals:  Patient has maintained mentation and is alert and oriented x4. He was educated on plan of care, call light use, bed alarm and fall risk. Patient was able to fall asleep after tylenol administration.    Problem: Knowledge Deficit - Standard  Goal: Patient and family/care givers will demonstrate understanding of plan of care, disease process/condition, diagnostic tests and medications  Outcome: Progressing     Problem: Fall Risk  Goal: Patient will remain free from falls  Outcome: Progressing     Problem: Pain - Standard  Goal: Alleviation of pain or a reduction in pain to the patient’s comfort goal  Outcome: Progressing       Patient is not progressing towards the following goals:

## 2021-07-12 NOTE — THERAPY
"Occupational Therapy  Daily Treatment     Patient Name: Eriberto Young  Age:  66 y.o., Sex:  male  Medical Record #: [id number]  Today's Date: 7/12/2021     Precautions  Precautions: Fall Risk, Swallow Precautions ( See Comments)  Comments: Seizures    Assessment    Pt seen for OT treatment today and is making good progress towards OT goals. Pt performed functional mobility and ADLs w/ SPV. Assessed pt w/ ROM and MMT and pt appears to be bilaterally equal and back to his PLOF. Pt reports that he is feeling better and states that his wife will provide assistance as needed since she works from home. Educated pt on the benefits of HH OT vs. Outpatient OT; appears to internalize ed. Recommend DC w/ HH OT once medically cleared.    Plan    Continue current treatment plan.    DC Equipment Recommendations: Unable to determine at this time  Discharge Recommendations: (P) Recommend home health for continued occupational therapy services    Subjective    \"I am from [place]\"     Objective       07/12/21 1004   Total Time Spent   Total Time Spent (Mins) 23   Precautions   Precautions Fall Risk;Swallow Precautions ( See Comments)   Comments Seizures   Pain 0 - 10 Group   Therapist Pain Assessment Post Activity Pain Same as Prior to Activity;Nurse Notified;0  (no c/o pain)   Cognition    Cognition / Consciousness X   Speech/ Communication Delayed Responses   Level of Consciousness Alert   Comments pleasant, cooperative, appears to internalize ed towards med management and home environment   Passive ROM Upper Body   Passive ROM Upper Body WDL   Active ROM Upper Body   Active ROM Upper Body  WDL   Strength Upper Body   Upper Body Strength  WDL   Sensation Upper Body   Upper Extremity Sensation  WDL   Upper Body Muscle Tone   Upper Body Muscle Tone  WDL   Other Treatments   Other Treatments Provided educated on HH OT versus outpatient OT   Balance   Sitting Balance (Static) Good   Sitting Balance (Dynamic) Fair +   Standing " Balance (Static) Good   Standing Balance (Dynamic) Fair +   Weight Shift Sitting Good   Weight Shift Standing Fair   Skilled Intervention Compensatory Strategies;Facilitation;Postural Facilitation;Sequencing;Tactile Cuing;Verbal Cuing   Comments w/ FWW; no rachael lob noted   Bed Mobility    Supine to Sit   (up w/ nursing pre)   Sit to Supine   (up in chair post)   Scooting Supervised   Rolling   (NT up in chair post)   Skilled Intervention Compensatory Strategies;Facilitation;Postural Facilitation;Sequencing;Tactile Cuing;Verbal Cuing   Comments up w/ nursing pre and up in chair post   Activities of Daily Living   Eating Supervision   Grooming Supervision;Standing   Bathing   (discussed home s/u; AE)   Upper Body Dressing Supervision   Lower Body Dressing Supervision   Toileting Supervision   Skilled Intervention Compensatory Strategies;Facilitation;Postural Facilitation;Sequencing;Tactile Cuing;Verbal Cuing   Comments w/ FWW   How much help from another person does the patient currently need...   Putting on and taking off regular lower body clothing? 3   Bathing (including washing, rinsing, and drying)? 3   Toileting, which includes using a toilet, bedpan, or urinal? 3   Putting on and taking off regular upper body clothing? 4   Taking care of personal grooming such as brushing teeth? 4   Eating meals? 4   6 Clicks Daily Activity Score 21   Modified Genet (mRS)   Modified Genet Score 1   Functional Mobility   Sit to Stand Supervised   Bed, Chair, Wheelchair Transfer Supervised   Transfer Method Stand Step   Mobility w/in bathroom > hallway > room   Distance (Feet) 150   # of Times Distance was Traveled 1   Skilled Intervention Compensatory Strategies;Facilitation;Postural Facilitation;Sequencing;Tactile Cuing;Verbal Cuing   Comments w/ FWW   Activity Tolerance   Sitting in Chair 8 min   Sitting Edge of Bed NT   Standing 10 min   Comments no s/s physical extertion noted   Patient / Family Goals   Patient / Family  Goal #1 To get better   Goal #1 Outcome Progressing as expected   Short Term Goals   Short Term Goal # 1 Pt will complete toilet transfer with Sarah by discharge.   Goal Outcome # 1 Progressing as expected   Short Term Goal # 2 Pt will complete seated grooming/hygiene with setup by discharge.   Goal Outcome # 2 Progressing as expected   Short Term Goal # 3 Pt will complete LB dressing with Sarah and use of AE as needed by discharge.   Goal Outcome # 3 Progressing as expected   Education Group   Education Provided Role of Occupational Therapist;Activities of Daily Living   Role of Occupational Therapist Patient Response Patient;Eager;Acceptance;Explanation;Demonstration;Verbal Demonstration;Action Demonstration   ADL Patient Response Patient;Eager;Acceptance;Explanation;Demonstration;Verbal Demonstration;Action Demonstration   Anticipated Discharge Equipment and Recommendations   DC Equipment Recommendations Unable to determine at this time   Discharge Recommendations Recommend home health for continued occupational therapy services   Interdisciplinary Plan of Care Collaboration   IDT Collaboration with  Nursing;Physical Therapist   Patient Position at End of Therapy Seated;Chair Alarm On;Call Light within Reach;Tray Table within Reach;Phone within Reach   Collaboration Comments report given   Session Information   Date / Session Number  7/12 #2 (1/4, 7/18)   Priority 1

## 2021-07-12 NOTE — THERAPY
Physical Therapy   Daily Treatment     Patient Name: Eriberto Young  Age:  66 y.o., Sex:  male  Medical Record #: [id number]  Today's Date: 7/12/2021     Precautions: Fall Risk, Swallow Precautions ( See Comments)    Assessment    Patient agreeable to physical therapy treatment session.  He was able to ambulate approx. 400 ft with FWW and SPV, no deviations.  He was able to ascend/descend 6 steps with single UE support with SPV.  Patient states he will be able to have all needs met on 1st floor of his home, however anticipate he would be able to do FOS with SPV if necessary.  Educated patient about slowly increasing activity levels once he returns home, patient with good understanding.  Patient has no further acute PT needs.    Plan    Discharge secondary to goals met.    DC Equipment Recommendations: None  Discharge Recommendations: Recommend home health for continued physical therapy services       Objective     07/12/21 1003   Precautions   Precautions Fall Risk   Comments Seizures   Pain   Pain Scales 0 to 10 Scale    Intervention Declines   Pain 0 - 10 Group   Pain Rating Scale (NPRS) 0   Comfort Goal Comfort with Movement;Perform Activity   Therapist Pain Assessment Post Activity Pain Same as Prior to Activity   Cognition    Speech/ Communication Delayed Responses   Level of Consciousness Alert   Comments Pleasant & cooperative   Other Treatments   Other Treatments Provided Gait & stair training   Balance   Sitting Balance (Static) Good   Sitting Balance (Dynamic) Fair +   Standing Balance (Static) Fair +   Standing Balance (Dynamic) Fair +   Weight Shift Sitting Good   Weight Shift Standing Fair   Skilled Intervention Verbal Cuing   Comments w/ FWW   Gait Analysis   Gait Level Of Assist Supervised   Assistive Device Front Wheel Walker   Distance (Feet) 400   # of Times Distance was Traveled 1   Deviation No deviation   # of Stairs Climbed 6   Level of Assist with Stairs Supervised   Weight Bearing Status No  restrictions   Skilled Intervention Verbal Cuing   Comments UE tremors throughout session   Bed Mobility    Comments in bathroom pre session, in chair post session   Functional Mobility   Sit to Stand Supervised   Bed, Chair, Wheelchair Transfer Supervised   Transfer Method Stand Step   Mobility Ambulation, stairs   Skilled Intervention Verbal Cuing   Activity Tolerance   Sitting in Chair Post session   Standing 10 min   Short Term Goals    Short Term Goal # 1 pt will perform supine <> sit without bed features with SPV in 6 visits to get in/out of bed at home   Goal Outcome # 1 (Not addressed today, anticipate patient can perform)   Short Term Goal # 2 pt will perform all functional xfrs with SPV in 6 visits for improved independence   Goal Outcome # 2 Goal met   Short Term Goal # 3 pt will ambulate 150ft with FWW and SPV in 6 visits to access home   Goal Outcome # 3 Goal met   Short Term Goal # 4 pt will negotiate FOS with SPV in 6 visits to access home   Goal Outcome # 4 Goal met (Patient able to do 6 steps, anticipate could do FOS with SPV)   Session Information   Date / Session Number  7/12 - DC

## 2021-07-12 NOTE — THERAPY
"Speech Language Pathology  Daily Treatment     Patient Name: Eriberto Young  Age:  66 y.o., Sex:  male  Medical Record #: [id number]  Today's Date: 7/12/2021       Assessment    The patient was seen for dysphagia therapy this date. The patient was awake, alert and eager for therapy session as he is requesting thin liquids. The patient was sitting up in bedside chair for session. The patient was seen during his soft and bite size meal tray with PO trials of regular solids and thin liquids. The patient consumed PO items with no overt s/s of aspiration on PO trials. Patient independently followed swallow strategies following initial prompting. The patient verbalized clear understanding of swallow precautions, aspiration risk and SLP recommendations.     Plan    Recommendations: 1) Upgrade to regular/ thin liquid meal items with standard swallow strategies. 2) Hold PO with any difficulty or change in status.     Continue current treatment plan.    Discharge Recommendations: Anticipate that the patient will have no further speech therapy needs after discharge from the hospital     Objective       07/12/21 0835   Dysphagia    Positioning / Behavior Modification Self Monitoring;Modulate Rate or Bite Size;Multiple Swallows   Other Treatments SB6/MT2 meal tray with PO trials of thins and regular solids.    Diet / Liquid Recommendation Regular (7);Thin (0)   Nutritional Liquid Intake Rating Scale Non thickened beverages   Nutritional Food Intake Rating Scale Total oral diet with no restrictions   Skilled Intervention Compensatory Strategies;Verbal Cueing   Recommended Route of Medication Administration   Medication Administration  Whole with Liquid Wash   Patient / Family Goals   Patient / Family Goal #1 \" Can I have water?\"   Goal #1 Outcome Goal met   Short Term Goals   Short Term Goal # 1 The patient will utilize swallow strategies during SB6/MT2 meal items with no overt s/s of aspiration given min A   Goal Outcome # 1 Goal " met, new goal added   Short Term Goal # 1 B  The patient will consume regular/thin liquids meal items with no overt s/s of aspiration or difficulty.

## 2021-07-12 NOTE — PROGRESS NOTES
Discharge instructions reviewed with patient at bedside. Allowed for and answered questions. Patient verbalizes understanding. Accounts for all belongings at discharge.     Will be going home with wife after Home Health arrangements with case management.

## 2021-07-13 NOTE — DISCHARGE PLANNING
Action:  RN CM spoke with representative with [facility] today.  Pt accepted and he has been scheduled to start therapy.

## 2021-08-10 ENCOUNTER — OFFICE VISIT (OUTPATIENT)
Dept: NEUROLOGY | Facility: MEDICAL CENTER | Age: 66
End: 2021-08-10
Attending: PSYCHIATRY & NEUROLOGY
Payer: COMMERCIAL

## 2021-08-10 VITALS
HEART RATE: 62 BPM | SYSTOLIC BLOOD PRESSURE: 110 MMHG | DIASTOLIC BLOOD PRESSURE: 68 MMHG | WEIGHT: 191.8 LBS | OXYGEN SATURATION: 96 % | TEMPERATURE: 98 F | HEIGHT: 66 IN | BODY MASS INDEX: 30.82 KG/M2

## 2021-08-10 DIAGNOSIS — F44.5 PSYCHOGENIC NONEPILEPTIC SEIZURE: ICD-10-CM

## 2021-08-10 DIAGNOSIS — F02.80 DEMENTIA ASSOCIATED WITH OTHER UNDERLYING DISEASE WITHOUT BEHAVIORAL DISTURBANCE (HCC): ICD-10-CM

## 2021-08-10 DIAGNOSIS — F43.23 ADJUSTMENT DISORDER WITH MIXED ANXIETY AND DEPRESSED MOOD: ICD-10-CM

## 2021-08-10 DIAGNOSIS — G40.219 COMPLEX PARTIAL EPILEPSY, INTRACTABLE (HCC): ICD-10-CM

## 2021-08-10 DIAGNOSIS — G43.719 INTRACTABLE CHRONIC MIGRAINE WITHOUT AURA AND WITHOUT STATUS MIGRAINOSUS: ICD-10-CM

## 2021-08-10 DIAGNOSIS — G43.019 INTRACTABLE MIGRAINE WITHOUT AURA AND WITHOUT STATUS MIGRAINOSUS: ICD-10-CM

## 2021-08-10 PROBLEM — G20.A1 PARKINSON'S DISEASE (HCC): Status: RESOLVED | Noted: 2021-05-03 | Resolved: 2021-08-10

## 2021-08-10 PROCEDURE — 99214 OFFICE O/P EST MOD 30 MIN: CPT | Performed by: PSYCHIATRY & NEUROLOGY

## 2021-08-10 PROCEDURE — 99211 OFF/OP EST MAY X REQ PHY/QHP: CPT | Performed by: PSYCHIATRY & NEUROLOGY

## 2021-08-10 RX ORDER — TOPIRAMATE SPINKLE 25 MG/1
25 CAPSULE ORAL 2 TIMES DAILY
Qty: 60 CAPSULE | Refills: 3 | Status: SHIPPED | OUTPATIENT
Start: 2021-08-10 | End: 2021-09-02

## 2021-08-10 ASSESSMENT — FIBROSIS 4 INDEX: FIB4 SCORE: 1.43

## 2021-08-10 NOTE — ASSESSMENT & PLAN NOTE
Pt with recent hospitalization for PNES after a fall of a ladder and had a witnessed seizure and was careflighted from [facility] to [facility]. Pt had continued seizures after transfer to the neurology floor. A 72 hour EEG showed PNES with the event of the clinical shaking activity. Pt has had problems with PNES in the past and he tends to have issues with breakthrough PNES when he is stressed. Pt also has some underlying cognitive issues from the traumatic brain injury.

## 2021-08-11 PROBLEM — G20.A1 PARKINSON DISEASE (HCC): Status: RESOLVED | Noted: 2021-07-02 | Resolved: 2021-08-11

## 2021-08-11 PROBLEM — J96.90 RESPIRATORY FAILURE FOLLOWING TRAUMA (HCC): Status: RESOLVED | Noted: 2021-07-02 | Resolved: 2021-08-11

## 2021-08-11 PROBLEM — G40.909 EPILEPTIC SEIZURE (HCC): Status: ACTIVE | Noted: 2021-07-02

## 2021-08-11 RX ORDER — SUMATRIPTAN 50 MG/1
TABLET, FILM COATED ORAL
COMMUNITY
End: 2021-11-01

## 2021-08-11 RX ORDER — TOPIRAMATE 50 MG/1
TABLET, FILM COATED ORAL
COMMUNITY
End: 2021-11-01

## 2021-08-11 RX ORDER — TOPIRAMATE 25 MG/1
TABLET ORAL
COMMUNITY
End: 2021-08-11

## 2021-08-11 RX ORDER — INFLUENZA A VIRUS A/IDAHO/07/2018 (H1N1) ANTIGEN (MDCK CELL DERIVED, PROPIOLACTONE INACTIVATED, INFLUENZA A VIRUS A/INDIANA/08/2018 (H3N2) ANTIGEN (MDCK CELL DERIVED, PROPIOLACTONE INACTIVATED), INFLUENZA B VIRUS B/SINGAPORE/INFTT-16-0610/2016 ANTIGEN (MDCK CELL DERIVED, PROPIOLACTONE INACTIVATED), INFLUENZA B VIRUS B/IOWA/06/2017 ANTIGEN (MDCK CELL DERIVED, PROPIOLACTONE INACTIVATED) 15; 15; 15; 15 UG/.5ML; UG/.5ML; UG/.5ML; UG/.5ML
INJECTION, SUSPENSION INTRAMUSCULAR
COMMUNITY

## 2021-08-11 RX ORDER — ZOSTER VACCINE RECOMBINANT, ADJUVANTED 50 MCG/0.5
KIT INTRAMUSCULAR
COMMUNITY

## 2021-08-11 NOTE — PROGRESS NOTES
Chief Complaint   Patient presents with   • Follow-Up     migraines       Problem List Items Addressed This Visit     Complex partial epilepsy, intractable (HCC)     Pt has not had any other recent seizures. Pt takes topamax and he also takes depakote for the history of seizure and also for migraine headaches. Pt has been feeling more dizzy and has had more cognitive slowing since the increase in the topamax.         Relevant Medications    topiramate (TOPAMAX) 25 MG capsule    Headache, chronic migraine without aura, intractable     Overall migraines have been more under control since he was on the emgality.         Relevant Medications    topiramate (TOPAMAX) 25 MG capsule    Dementia associated with other underlying disease without behavioral disturbance (HCC)     Pt has had problems with increasing problems since he had the sedating medications.         Relevant Medications    topiramate (TOPAMAX) 25 MG capsule    Adjustment reaction with anxiety and depression     Pt has had a hard time coping with his change in his capabilities since he had the head trauma. He has a psychiatrist and a psychologist that he would like to potentially change to a new provider.         Psychogenic nonepileptic seizure     Pt with recent hospitalization for PNES after a fall of a ladder and had a witnessed seizure and was careflighted from [facility] to [facility]. Pt had continued seizures after transfer to the neurology floor. A 72 hour EEG showed PNES with the event of the clinical shaking activity. Pt has had problems with PNES in the past and he tends to have issues with breakthrough PNES when he is stressed. Pt also has some underlying cognitive issues from the traumatic brain injury.         Relevant Medications    topiramate (TOPAMAX) 25 MG capsule      Other Visit Diagnoses     Intractable migraine without aura and without status migrainosus        Relevant Medications    topiramate (TOPAMAX) 25 MG capsule          History of  present illness:  Eriberto Young 65 y.o. male presents today for Hologic treatment of multiple medical and neurologic issues related to traumatic brain injury in 2006.  Patient's tremor and gait have gotten slightly worse since I saw him last.    Past medical history:   Past Medical History:   Diagnosis Date   • Anxiety    • Chronic pain    • Complex partial epilepsy, intractable (HCC)    • Depression    • Head injuries    • Hydrocephalus (HCC)     w/ shunt   • Post concussion syndrome 11/10/2014   • Self-injurious behavior    • Suicide attempt (HCC)        Past surgical history:   Past Surgical History:   Procedure Laterality Date   • ABDOMINAL EXPLORATION     • ACHILLES TENDON REPAIR     • CRANIOTOMY     • HERNIA REP HIATAL      times 2   • HERNIA REPAIR     • LAMINOTOMY         Family history:   Family History   Problem Relation Age of Onset   • Alcohol abuse Father    • ADD / ADHD Maternal Grandfather    • ADD / ADHD Maternal Grandmother    • Dementia Maternal Grandmother        Social history:   Social History     Socioeconomic History   • Marital status:      Spouse name: Not on file   • Number of children: Not on file   • Years of education: Not on file   • Highest education level: Not on file   Occupational History   • Not on file   Tobacco Use   • Smoking status: Never Smoker   • Smokeless tobacco: Never Used   Substance and Sexual Activity   • Alcohol use: Yes     Alcohol/week: 1.2 oz     Types: 2 Glasses of wine per week   • Drug use: No   • Sexual activity: Not on file   Other Topics Concern   • Not on file   Social History Narrative   • Not on file     Social Determinants of Health     Financial Resource Strain:    • Difficulty of Paying Living Expenses:    Food Insecurity:    • Worried About Running Out of Food in the Last Year:    • Ran Out of Food in the Last Year:    Transportation Needs:    • Lack of Transportation (Medical):    • Lack of Transportation (Non-Medical):    Physical Activity:     • Days of Exercise per Week:    • Minutes of Exercise per Session:    Stress:    • Feeling of Stress :    Social Connections:    • Frequency of Communication with Friends and Family:    • Frequency of Social Gatherings with Friends and Family:    • Attends Orthodox Services:    • Active Member of Clubs or Organizations:    • Attends Club or Organization Meetings:    • Marital Status:    Intimate Partner Violence:    • Fear of Current or Ex-Partner:    • Emotionally Abused:    • Physically Abused:    • Sexually Abused:        Current medications:   Current Outpatient Medications   Medication   • topiramate (TOPAMAX) 25 MG capsule   • loperamide (IMODIUM) 2 MG Cap   • OnabotulinumtoxinA (BOTOX INJ)   • divalproex ER (DEPAKOTE ER) 500 MG TABLET SR 24 HR   • DULoxetine (CYMBALTA) 60 MG Cap DR Particles delayed-release capsule   • therapeutic multivitamin-minerals (THERAGRAN-M) Tab   • omega-3 acid ethyl esters (LOVAZA) 1 GM capsule   • propranolol (INDERAL) 20 MG Tab   • traZODone (DESYREL) 50 MG Tab   • Memantine HCl ER (NAMENDA) 28 MG CAPSULE SR 24 HR   • ketorolac (TORADOL) 10 MG Tab   • divalproex ER (DEPAKOTE ER) 500 MG TABLET SR 24 HR   • EMGALITY 120 MG/ML Solution Auto-injector   • DULoxetine (CYMBALTA) 30 MG Cap DR Particles   • Mirabegron ER 50 MG TABLET SR 24 HR   • oxyCODONE-acetaminophen (PERCOCET) 5-325 MG Tab   • Ibuprofen 200 MG Cap   • asa/apap/caffeine (EXCEDRIN) 250-250-65 MG Tab   • BOTOX 200 UNITS Recon Soln   • pantoprazole (PROTONIX) 40 MG Tablet Delayed Response   • acetaminophen/caffeine/butalbital 325-40-50 mg (FIORICET) -40 MG Tab   • hydrocodone-acetaminophen (NORCO) 5-325 MG TABS per tablet   • omega-3 acid ethyl esters (LOVAZA) 1 GM capsule   • M-VIT PO     No current facility-administered medications for this visit.       Medication Allergy:  Allergies   Allergen Reactions   • Ativan Unspecified     Okay with small amount   • Hydromorphone Unspecified     Okay with small amount  "  • Ativan      High doses   • Hydromorphone Hcl    • Nkda [No Known Drug Allergy]        Review of systems:   Constitutional: denies fever, night sweats, weight loss.   Eyes: denies acute vision change, eye pain or secretion.   Ears, Nose, Mouth, Throat: denies nasal secretion, nasal bleeding, difficulty swallowing, hearing loss, tinnitus, vertigo, ear pain, acute dental problems, oral ulcers or lesions.   Endocrine: denies recent weight changes, heat or cold intolerance, polyuria, polydypsia, polyphagia,abnormal hair growth.  Cardiovascular: denies new onset of chest pain, palpitations, syncope, or dyspnea of exertion.  Pulmonary: denies shortness of breath, new onset of cough, hemoptysis, wheezing, chest pain or flu-like symptoms.   GI: denies nausea, vomiting, diarrhea, GI bleeding, change in appetite, abdominal pain, and change in bowel habits.  : denies dysuria, urinary incontinence, hematuria.  Heme/oncology: denies history of easy bruising or bleeding. No history of cancer, DVTor PE.  Allergy/immunology: denies hives/urticaria, or itching.   Dermatologic: denies new rash, or new skin lesions.  Musculoskeletal:denies joint swelling or pain, muscle pain, neck and back pain. Neurologic: denies headaches, acute visual changes, facial droopiness, muscle weakness (focal or generalized), paresthesias, anesthesia, ataxia, change in speech or language, memory loss, abnormal movements, seizures, loss of consciousness, or episodes of confusion.   Psychiatric: denies symptoms of depression, anxiety, hallucinations, mood swings or changes, suicidal or homicidal thoughts.     Physical examination:   Vitals:    08/10/21 0807   BP: 110/68   BP Location: Right arm   Patient Position: Sitting   BP Cuff Size: Adult   Pulse: 62   Temp: 36.7 °C (98 °F)   TempSrc: Temporal   SpO2: 96%   Weight: 87 kg (191 lb 12.8 oz)   Height: 1.676 m (5' 6\")     General: Patient in no acute distress, pleasant and cooperative.  HEENT: " Normocephalic, no signs of acute trauma.   Neck: supple, no meningeal signs or carotid bruits. There is normal range of motion. No tenderness on exam.   Chest: clear to auscultation. No cough.   CV: RRR, no murmurs.   Skin: no signs of acute rashes or trauma.   Musculoskeletal: joints exhibit full range of motion, without any pain to palpation. There are no signs of joint or muscle swelling. There is no tenderness to deep palpation of muscles.   Psychiatric: No hallucinatory behavior. Denies symptoms of depression or suicidal ideation. Mood and affect appear normal on exam.     NEUROLOGICAL EXAM:   Mental status, orientation: Awake, alert and fully oriented.   Speech and language: speech slow and stuttering. The patient is able to name, repeat and comprehend.   Memory: There is intact recollection of recent and remote events.   Cranial nerve exam: Pupils are 3-4 mm bilaterally and equally reactive to light and accommodation. Visual fields are intact by confrontation. Fundoscopic exam was unremarkable. There is no nystagmus on primary or secondary gaze. Intact full EOM in all directions of gaze. Face appears symmetric. Sensation in the face is intact to light touch. Uvula is midline. Palate elevates symmetrically. Tongue is midline and without any signs of tongue biting or fasciculations. Sternocleidomastoid muscles exhibit is normal strength bilaterally. Shoulder shrug is intact bilaterally.   Motor exam: Strength is 4+/5 in all extremities. Tone is abnormal.  Resting in and action tremor are present and slightly worse in his right over his left arm.  Sensory exam reveals normal sense of light touch, proprioception, vibration and pinprick in all extremities.   Deep tendon reflexes:  2+ throughout. Plantar responses are flexor. There is no clonus.   Coordination: shows a normal finger-nose-finger. Normal rapidly alternating movements.   Gait: The patient was able to get up from seated position on first attempt  without requiring assistance and walks with a broad base. Movements accompanied by tremor. The patient was able to turn slowly. Romberg examination positive      ANCILLARY DATA REVIEWED:     Lab Data Review:  No results found for this or any previous visit (from the past 24 hour(s)).    Records reviewed: Reviewed records from [place] and Dr. Henriquez      Imaging: Viewed imaging of the brain from [place].          ASSESSMENT AND PLAN:    1. Intractable chronic migraine without aura and without status migrainosus  Plan to refill topiramate and to continue with pain management for Botox and Emgality. Plan to taper off of the Topamax slowly over the next few months to see if he has any improvement in his cognition and tremor.    - topiramate (TOPAMAX) 50 MG tablet; TAKE 1 TABLET BY MOUTH TWICE A DAY  Dispense: 180 tablet; Refill: 3    2. Complex partial epilepsy, intractable (HCC)  He will also continue with Depakote 500 mg BID  - topiramate (TOPAMAX) 25 MG tablet; TAKE 1 TABLET BY MOUTH TWICE A DAY  Dispense: 180 tablet; Refill: 3    3. Dementia associated with other underlying disease without behavioral disturbance (HCC)  I refilled patient's memantine and he has benefited from it previously  - Memantine HCl ER (NAMENDA) 28 MG CAPSULE SR 24 HR; Take 1 capsule by mouth every day.  Dispense: 90 capsule; Refill: 3    4. Psychogenic Non-epileptic seizures  Plan to discuss getting a medical ID bracelet for preventing benzodiazepines in the case of further PNES. Pt is going to discuss with his psychiatrist. Pt is also experiencing problems with functional tremor when he is more nervous that abates when he is relaxed per my observation and his wife Kallie's evaluation.      My total time spent caring for the patient on the day of the encounter was 45 minutes.   This does not include time spent on separately billable procedures/tests.    FOLLOW-UP:   3 months           EDUCATION AND COUNSELING:  -Discussed regular  exercise program and prevention of cardiovascular disease, including stroke.   -Discussed healthy lifestyle, including: healthy diet (rich in fruits, vegetables, nuts and healthy oils); proper hydration, and adequate sleep hygiene (allowing 7-8 hrs of overnight sleep).        Melissa Bloch, MD  Clinical  of Neurology [facility].   Diplomate in Neurology.   Office: [phone]  Fax: [phone]

## 2021-08-11 NOTE — ASSESSMENT & PLAN NOTE
Pt has had a hard time coping with his change in his capabilities since he had the head trauma. He has a psychiatrist and a psychologist that he would like to potentially change to a new provider.

## 2021-08-11 NOTE — ASSESSMENT & PLAN NOTE
Pt has not had any other recent seizures. Pt takes topamax and he also takes depakote for the history of seizure and also for migraine headaches. Pt has been feeling more dizzy and has had more cognitive slowing since the increase in the topamax.

## 2021-09-02 DIAGNOSIS — G43.019 INTRACTABLE MIGRAINE WITHOUT AURA AND WITHOUT STATUS MIGRAINOSUS: ICD-10-CM

## 2021-09-02 RX ORDER — TOPIRAMATE SPINKLE 25 MG/1
CAPSULE ORAL
Qty: 60 CAPSULE | Refills: 3 | Status: SHIPPED | OUTPATIENT
Start: 2021-09-02 | End: 2022-03-07

## 2021-10-01 DIAGNOSIS — G43.019 INTRACTABLE MIGRAINE WITHOUT AURA AND WITHOUT STATUS MIGRAINOSUS: ICD-10-CM

## 2021-10-01 RX ORDER — PROPRANOLOL HYDROCHLORIDE 20 MG/1
20 TABLET ORAL EVERY EVENING
Qty: 90 TABLET | Refills: 2 | Status: SHIPPED | OUTPATIENT
Start: 2021-10-01 | End: 2021-10-11

## 2021-10-11 DIAGNOSIS — G25.1 TREMOR DUE TO MULTIPLE DRUGS: ICD-10-CM

## 2021-10-11 DIAGNOSIS — G43.019 INTRACTABLE MIGRAINE WITHOUT AURA AND WITHOUT STATUS MIGRAINOSUS: ICD-10-CM

## 2021-10-11 RX ORDER — PROPRANOLOL HCL 60 MG
60 CAPSULE, EXTENDED RELEASE 24HR ORAL DAILY
Qty: 90 CAPSULE | Refills: 3 | Status: SHIPPED | OUTPATIENT
Start: 2021-10-11 | End: 2022-09-07

## 2021-11-01 ENCOUNTER — OFFICE VISIT (OUTPATIENT)
Dept: NEUROLOGY | Facility: MEDICAL CENTER | Age: 66
End: 2021-11-01
Attending: PSYCHIATRY & NEUROLOGY
Payer: COMMERCIAL

## 2021-11-01 VITALS
BODY MASS INDEX: 31.89 KG/M2 | HEIGHT: 66 IN | DIASTOLIC BLOOD PRESSURE: 78 MMHG | OXYGEN SATURATION: 95 % | HEART RATE: 67 BPM | WEIGHT: 198.41 LBS | TEMPERATURE: 98.2 F | SYSTOLIC BLOOD PRESSURE: 116 MMHG

## 2021-11-01 DIAGNOSIS — G25.1 TREMOR DUE TO MULTIPLE DRUGS: ICD-10-CM

## 2021-11-01 DIAGNOSIS — G43.719 INTRACTABLE CHRONIC MIGRAINE WITHOUT AURA AND WITHOUT STATUS MIGRAINOSUS: ICD-10-CM

## 2021-11-01 DIAGNOSIS — G40.219 COMPLEX PARTIAL EPILEPSY, INTRACTABLE (HCC): ICD-10-CM

## 2021-11-01 PROBLEM — G93.40 ACUTE ENCEPHALOPATHY: Status: RESOLVED | Noted: 2021-07-02 | Resolved: 2021-11-01

## 2021-11-01 PROCEDURE — 99212 OFFICE O/P EST SF 10 MIN: CPT | Performed by: PSYCHIATRY & NEUROLOGY

## 2021-11-01 PROCEDURE — 99215 OFFICE O/P EST HI 40 MIN: CPT | Performed by: PSYCHIATRY & NEUROLOGY

## 2021-11-01 ASSESSMENT — FIBROSIS 4 INDEX: FIB4 SCORE: 1.43

## 2021-11-02 NOTE — PROGRESS NOTES
Chief Complaint   Patient presents with   • Follow-Up     migraines        Problem List Items Addressed This Visit     Complex partial epilepsy, intractable (HCC)     Pt states the depakote helps with the CPS. He is seeing Pamela sagastume to help with stressors.         Tremor due to multiple drugs     Pt feels better on his regular propranolol dose with tremor.         Headache, chronic migraine without aura, intractable     Pt sees pain [place] for Botox and Emgality. He has occasional issues with                History of present illness:  Eriberto Young 66 y.o. male presents today for to determine how he is doing on a decrease in his topiramate.    Past medical history:   Past Medical History:   Diagnosis Date   • Anxiety    • Chronic pain    • Complex partial epilepsy, intractable (HCC)    • Depression    • Head injuries    • Hydrocephalus (HCC)     w/ shunt   • Post concussion syndrome 11/10/2014   • Self-injurious behavior    • Suicide attempt (HCC)        Past surgical history:   Past Surgical History:   Procedure Laterality Date   • ABDOMINAL EXPLORATION     • ACHILLES TENDON REPAIR     • CRANIOTOMY     • HERNIA REP HIATAL      times 2   • HERNIA REPAIR     • LAMINOTOMY         Family history:   Family History   Problem Relation Age of Onset   • Alcohol abuse Father    • ADD / ADHD Maternal Grandfather    • ADD / ADHD Maternal Grandmother    • Dementia Maternal Grandmother        Social history:   Social History     Socioeconomic History   • Marital status:      Spouse name: Not on file   • Number of children: Not on file   • Years of education: Not on file   • Highest education level: Not on file   Occupational History   • Not on file   Tobacco Use   • Smoking status: Never Smoker   • Smokeless tobacco: Never Used   Substance and Sexual Activity   • Alcohol use: Yes     Alcohol/week: 1.2 oz     Types: 2 Glasses of wine per week   • Drug use: No   • Sexual activity: Not on file   Other Topics Concern   •  Not on file   Social History Narrative   • Not on file     Social Determinants of Health     Financial Resource Strain:    • Difficulty of Paying Living Expenses:    Food Insecurity:    • Worried About Running Out of Food in the Last Year:    • Ran Out of Food in the Last Year:    Transportation Needs:    • Lack of Transportation (Medical):    • Lack of Transportation (Non-Medical):    Physical Activity:    • Days of Exercise per Week:    • Minutes of Exercise per Session:    Stress:    • Feeling of Stress :    Social Connections:    • Frequency of Communication with Friends and Family:    • Frequency of Social Gatherings with Friends and Family:    • Attends Gnosticism Services:    • Active Member of Clubs or Organizations:    • Attends Club or Organization Meetings:    • Marital Status:    Intimate Partner Violence:    • Fear of Current or Ex-Partner:    • Emotionally Abused:    • Physically Abused:    • Sexually Abused:        Current medications:   Current Outpatient Medications   Medication   • propranolol LA (INDERAL LA) 60 MG CAPSULE SR 24 HR   • topiramate (TOPAMAX) 25 MG capsule   • Zoster Vac Recomb Adjuvanted (SHINGRIX) 50 MCG/0.5ML Recon Susp   • Influenza Vac Subunit Quad (FLUCELVAX QUADRIVALENT) 0.5 ML Suspension Prefilled Syringe injection   • loperamide (IMODIUM) 2 MG Cap   • OnabotulinumtoxinA (BOTOX INJ)   • DULoxetine (CYMBALTA) 60 MG Cap DR Particles delayed-release capsule   • therapeutic multivitamin-minerals (THERAGRAN-M) Tab   • omega-3 acid ethyl esters (LOVAZA) 1 GM capsule   • traZODone (DESYREL) 50 MG Tab   • Memantine HCl ER (NAMENDA) 28 MG CAPSULE SR 24 HR   • ketorolac (TORADOL) 10 MG Tab   • divalproex ER (DEPAKOTE ER) 500 MG TABLET SR 24 HR   • EMGALITY 120 MG/ML Solution Auto-injector   • DULoxetine (CYMBALTA) 30 MG Cap DR Particles   • Mirabegron ER 50 MG TABLET SR 24 HR   • oxyCODONE-acetaminophen (PERCOCET) 5-325 MG Tab   • Ibuprofen 200 MG Cap   • asa/apap/caffeine (EXCEDRIN)  250-250-65 MG Tab   • BOTOX 200 UNITS Recon Soln   • pantoprazole (PROTONIX) 40 MG Tablet Delayed Response   • acetaminophen/caffeine/butalbital 325-40-50 mg (FIORICET) -40 MG Tab   • hydrocodone-acetaminophen (NORCO) 5-325 MG TABS per tablet   • omega-3 acid ethyl esters (LOVAZA) 1 GM capsule   • M-VIT PO     No current facility-administered medications for this visit.       Medication Allergy:  Allergies   Allergen Reactions   • Ativan Unspecified     Okay with small amount   • Hydromorphone Unspecified     Okay with small amount   • Ativan      High doses   • Hydromorphone Hcl    • Nkda [No Known Drug Allergy]        Review of systems:   Constitutional: denies fever, night sweats, weight loss.   Eyes: denies acute vision change, eye pain or secretion.   Ears, Nose, Mouth, Throat: denies nasal secretion, nasal bleeding, difficulty swallowing, hearing loss, tinnitus, vertigo, ear pain, acute dental problems, oral ulcers or lesions.   Endocrine: denies recent weight changes, heat or cold intolerance, polyuria, polydypsia, polyphagia,abnormal hair growth.  Cardiovascular: denies new onset of chest pain, palpitations, syncope, or dyspnea of exertion.  Pulmonary: denies shortness of breath, new onset of cough, hemoptysis, wheezing, chest pain or flu-like symptoms.   GI: denies nausea, vomiting, diarrhea, GI bleeding, change in appetite, abdominal pain, and change in bowel habits.  : denies dysuria, urinary incontinence, hematuria.  Heme/oncology: denies history of easy bruising or bleeding. No history of cancer, DVTor PE.  Allergy/immunology: denies hives/urticaria, or itching.   Dermatologic: denies new rash, or new skin lesions.  Musculoskeletal:denies joint swelling or pain, muscle pain, neck and back pain. Neurologic: denies headaches, acute visual changes, facial droopiness, muscle weakness (focal or generalized), paresthesias, anesthesia, ataxia, change in speech or language, memory loss, abnormal  "movements, seizures, loss of consciousness, or episodes of confusion.   Psychiatric: denies symptoms of depression, anxiety, hallucinations, mood swings or changes, suicidal or homicidal thoughts.     Physical examination:   Vitals:    11/01/21 1123   BP: 116/78   BP Location: Right arm   Patient Position: Sitting   BP Cuff Size: Adult   Pulse: 67   Temp: 36.8 °C (98.2 °F)   TempSrc: Temporal   SpO2: 95%   Weight: 90 kg (198 lb 6.6 oz)   Height: 1.676 m (5' 6\")     General: Patient in no acute distress, pleasant and cooperative.  HEENT: Normocephalic, no signs of acute trauma.   Neck: supple, no meningeal signs or carotid bruits. There is normal range of motion. No tenderness on exam.   Chest: clear to auscultation. No cough.   CV: RRR, no murmurs.   Skin: no signs of acute rashes or trauma.   Musculoskeletal: joints exhibit full range of motion, without any pain to palpation. There are no signs of joint or muscle swelling. There is no tenderness to deep palpation of muscles.   Psychiatric: No hallucinatory behavior. Denies symptoms of depression or suicidal ideation. Mood and affect appear normal on exam.     NEUROLOGICAL EXAM:   Mental status, orientation: Awake, alert and fully oriented.   Speech and language: speech is clear and fluent. The patient is able to name, repeat and comprehend.   Memory: There is intact recollection of recent and remote events.   Cranial nerve exam: Pupils are 3-4 mm bilaterally and equally reactive to light and accommodation. Visual fields are intact by confrontation. Fundoscopic exam was unremarkable. There is no nystagmus on primary or secondary gaze. Intact full EOM in all directions of gaze. Face appears symmetric. Sensation in the face is intact to light touch. Uvula is midline. Palate elevates symmetrically. Tongue is midline and without any signs of tongue biting or fasciculations. Sternocleidomastoid muscles exhibit is normal strength bilaterally. Shoulder shrug is intact " bilaterally.   Motor exam: Strength is 4/5 in all extremities. Tone is normal.  Bilateral changing action and resting tremor with a functional component..   Sensory exam reveals normal sense of light touch, proprioception, vibration and pinprick in all extremities.   Deep tendon reflexes:  2+ throughout. Plantar responses are flexor. There is no clonus.   Coordination: shows a normal finger-nose-finger. Normal rapidly alternating movements.   Gait: The patient was able to get up from seated position on first attempt without requiring assistance. Found to be steady when walking. Movements were fluid with normal arm swing. The patient was able to turn without difficulties or tendency to fall. Romberg examination positive with a broad-based gait.      ANCILLARY DATA REVIEWED:     Lab Data Review:  No results found for this or any previous visit (from the past 24 hour(s)).    Records reviewed: Plan to review records from Dr. Etienne when he sees him.      Imaging: Reviewed last imaging studies of his brain from his hospitalization.          ASSESSMENT AND PLAN:    1. Tremor due to multiple drugs  Plan to see if tremor improves with decrease in topiramate.    2. Complex partial epilepsy, intractable (HCC)  Continue Depakote 500 mg twice daily.    3. Intractable chronic migraine without aura and without status migrainosus  Continue Depakote but wean off of topiramate to 25 mg nightly.  Keep keep a headache calendar as he decreases to determine if the topiramate was helping his headaches.  Patient will continue with Emgality and Botox through his pain specialist.  Dahlia the importance of better sleep hygiene and is working with sleep doctor to improve his sleep hygiene and efficiency.        FOLLOW-UP:   6 months      My total time spent caring for the patient on the day of the encounter was 50 minutes.   This does not include time spent on separately billable procedures/tests.    EDUCATION AND COUNSELING:  -Discussed  regular exercise program and prevention of cardiovascular disease, including stroke.   -Discussed healthy lifestyle, including: healthy diet (rich in fruits, vegetables, nuts and healthy oils); proper hydration, and adequate sleep hygiene (allowing 7-8 hrs of overnight sleep).        Melissa Bloch, MD  Clinical  of Neurology [facility].   Diplomate in Neurology.   Office: [phone]  Fax: [phone]

## 2021-11-15 DIAGNOSIS — R47.82 FLUENCY DISORDER ASSOCIATED WITH UNDERLYING DISEASE: ICD-10-CM

## 2021-11-15 DIAGNOSIS — S09.90XS COGNITIVE DEFICIT DUE TO OLD HEAD INJURY: ICD-10-CM

## 2021-11-15 DIAGNOSIS — R41.89 COGNITIVE DEFICIT DUE TO OLD HEAD INJURY: ICD-10-CM

## 2021-12-05 DIAGNOSIS — G40.219 COMPLEX PARTIAL EPILEPSY, INTRACTABLE (HCC): ICD-10-CM

## 2021-12-07 RX ORDER — DIVALPROEX SODIUM 500 MG/1
TABLET, EXTENDED RELEASE ORAL
Qty: 540 TABLET | Refills: 1 | Status: SHIPPED | OUTPATIENT
Start: 2021-12-07 | End: 2022-05-02

## 2021-12-22 ENCOUNTER — TELEPHONE (OUTPATIENT)
Dept: NEUROLOGY | Facility: MEDICAL CENTER | Age: 66
End: 2021-12-22

## 2021-12-22 NOTE — TELEPHONE ENCOUNTER
Eriberto Young Neurology [facility] Hi Doc Bloch!  Happy Holidays!  [facility], can we submit for a prescription?  Thank you!!       Sent via PPTV  
pacemaker: good capture, regular rhythm and appropriate intervals.

## 2022-01-03 ENCOUNTER — TELEPHONE (OUTPATIENT)
Dept: NEUROLOGY | Facility: MEDICAL CENTER | Age: 67
End: 2022-01-03

## 2022-01-03 NOTE — TELEPHONE ENCOUNTER
Pt wife Kallie called states pharmacist let her know pt takes Topamax and Depakote and should not be taken together. She states pt has been taking this for years and they never mentioned it, wanted to clarify with dr.  Please advise  Also mentioned Nurtec samples have helped and would like Rx

## 2022-01-04 DIAGNOSIS — G43.111 INTRACTABLE MIGRAINE WITH AURA WITH STATUS MIGRAINOSUS: ICD-10-CM

## 2022-01-04 RX ORDER — RIMEGEPANT SULFATE 75 MG/75MG
75 TABLET, ORALLY DISINTEGRATING ORAL
Qty: 30 TABLET | Refills: 5 | Status: SHIPPED | OUTPATIENT
Start: 2022-01-04 | End: 2022-02-03

## 2022-01-04 NOTE — TELEPHONE ENCOUNTER
NURTEC 75mg Dispersible Tablet    Request rec'd via [facility], ingrid TC via [place], KINGSLEY paid copay $65.00 #16/30 DS notifying liaison Vane Deleon. - 01/04/2022 3:32pm

## 2022-03-07 DIAGNOSIS — G43.019 INTRACTABLE MIGRAINE WITHOUT AURA AND WITHOUT STATUS MIGRAINOSUS: ICD-10-CM

## 2022-03-07 RX ORDER — TOPIRAMATE SPINKLE 25 MG/1
CAPSULE ORAL
Qty: 180 CAPSULE | Refills: 1 | Status: SHIPPED | OUTPATIENT
Start: 2022-03-07 | End: 2022-09-07

## 2022-04-24 DIAGNOSIS — F02.80 DEMENTIA ASSOCIATED WITH OTHER UNDERLYING DISEASE WITHOUT BEHAVIORAL DISTURBANCE (HCC): ICD-10-CM

## 2022-04-27 RX ORDER — MEMANTINE HYDROCHLORIDE 28 MG/1
28 CAPSULE, EXTENDED RELEASE ORAL
Qty: 90 CAPSULE | Refills: 3 | Status: SHIPPED | OUTPATIENT
Start: 2022-04-27 | End: 2023-04-07 | Stop reason: SDUPTHER

## 2022-05-02 ENCOUNTER — OFFICE VISIT (OUTPATIENT)
Dept: NEUROLOGY | Facility: MEDICAL CENTER | Age: 67
End: 2022-05-02
Attending: PSYCHIATRY & NEUROLOGY
Payer: COMMERCIAL

## 2022-05-02 VITALS
TEMPERATURE: 98 F | BODY MASS INDEX: 31.08 KG/M2 | DIASTOLIC BLOOD PRESSURE: 88 MMHG | HEIGHT: 67 IN | WEIGHT: 198 LBS | HEART RATE: 63 BPM | OXYGEN SATURATION: 96 % | RESPIRATION RATE: 16 BRPM | SYSTOLIC BLOOD PRESSURE: 140 MMHG

## 2022-05-02 DIAGNOSIS — G25.1 TREMOR DUE TO MULTIPLE DRUGS: ICD-10-CM

## 2022-05-02 DIAGNOSIS — G40.219 COMPLEX PARTIAL EPILEPSY, INTRACTABLE (HCC): ICD-10-CM

## 2022-05-02 PROCEDURE — 99213 OFFICE O/P EST LOW 20 MIN: CPT | Performed by: PSYCHIATRY & NEUROLOGY

## 2022-05-02 PROCEDURE — 99212 OFFICE O/P EST SF 10 MIN: CPT | Performed by: PSYCHIATRY & NEUROLOGY

## 2022-05-02 RX ORDER — ATORVASTATIN CALCIUM 10 MG/1
10 TABLET, FILM COATED ORAL NIGHTLY
COMMUNITY

## 2022-05-02 RX ORDER — DIVALPROEX SODIUM 500 MG/1
TABLET, EXTENDED RELEASE ORAL
Qty: 180 TABLET | Refills: 1 | Status: SHIPPED | OUTPATIENT
Start: 2022-05-02 | End: 2022-06-28

## 2022-05-02 ASSESSMENT — FIBROSIS 4 INDEX: FIB4 SCORE: 1.43

## 2022-05-02 ASSESSMENT — PATIENT HEALTH QUESTIONNAIRE - PHQ9: CLINICAL INTERPRETATION OF PHQ2 SCORE: 0

## 2022-05-02 ASSESSMENT — PAIN SCALES - GENERAL: PAINLEVEL: 7=MODERATE-SEVERE PAIN

## 2022-05-03 NOTE — PROGRESS NOTES
Chief Complaint   Patient presents with   • Follow-Up     Psychogenic nonepileptic seizure       Problem List Items Addressed This Visit     Complex partial epilepsy, intractable (HCC)     Pt has less seizures than previously.         Relevant Medications    divalproex ER (DEPAKOTE ER) 500 MG TABLET SR 24 HR    Tremor due to multiple drugs     Pt feels more right sided tremor. Tremor interferes with his drinking beverages.               History of present illness:  Eriberto Young 66 y.o. male presents today for treatment of epilepsy in addition to tremor likely medication induced.  We have decreased his Depakote to the lowest dose which is helped control him and he needs a new prescription sent in which I will do today.  He is compensated for his tremor by lifting water with 2 hands.  His tremor is better when he is more relaxed at home.  He recently had cataract surgery and had mild seizures after anesthesia but did not require any further work-up or hospitalization.    Past medical history:   Past Medical History:   Diagnosis Date   • Anxiety    • Chronic pain    • Complex partial epilepsy, intractable (HCC)    • Depression    • Diabetes (HCC)    • Head injuries    • Hydrocephalus (HCC)     w/ shunt   • Post concussion syndrome 11/10/2014   • Self-injurious behavior    • Suicide attempt (HCC)        Past surgical history:   Past Surgical History:   Procedure Laterality Date   • ABDOMINAL EXPLORATION     • ACHILLES TENDON REPAIR     • CRANIOTOMY     • HERNIA REP HIATAL      times 2   • HERNIA REPAIR     • LAMINOTOMY         Family history:   Family History   Problem Relation Age of Onset   • Alcohol abuse Father    • ADD / ADHD Maternal Grandfather    • ADD / ADHD Maternal Grandmother    • Dementia Maternal Grandmother        Social history:   Social History     Socioeconomic History   • Marital status:      Spouse name: Not on file   • Number of children: Not on file   • Years of education: Not on file   •  Highest education level: Not on file   Occupational History   • Not on file   Tobacco Use   • Smoking status: Never Smoker   • Smokeless tobacco: Never Used   Substance and Sexual Activity   • Alcohol use: Yes     Alcohol/week: 1.2 oz     Types: 2 Glasses of wine per week   • Drug use: No   • Sexual activity: Not on file   Other Topics Concern   • Not on file   Social History Narrative   • Not on file     Social Determinants of Health     Financial Resource Strain: Not on file   Food Insecurity: Not on file   Transportation Needs: Not on file   Physical Activity: Not on file   Stress: Not on file   Social Connections: Not on file   Intimate Partner Violence: Not on file   Housing Stability: Not on file       Current medications:   Current Outpatient Medications   Medication   • atorvastatin (LIPITOR) 10 MG Tab   • metformin (GLUCOPHAGE) 1000 MG tablet   • divalproex ER (DEPAKOTE ER) 500 MG TABLET SR 24 HR   • Memantine HCl ER (NAMENDA) 28 MG CAPSULE SR 24 HR   • topiramate (TOPAMAX) 25 MG capsule   • propranolol LA (INDERAL LA) 60 MG CAPSULE SR 24 HR   • Zoster Vac Recomb Adjuvanted (SHINGRIX) 50 MCG/0.5ML Recon Susp   • Influenza Vac Subunit Quad (FLUCELVAX QUADRIVALENT) 0.5 ML Suspension Prefilled Syringe injection   • loperamide (IMODIUM) 2 MG Cap   • OnabotulinumtoxinA (BOTOX INJ)   • DULoxetine (CYMBALTA) 60 MG Cap DR Particles delayed-release capsule   • therapeutic multivitamin-minerals (THERAGRAN-M) Tab   • omega-3 acid ethyl esters (LOVAZA) 1 GM capsule   • traZODone (DESYREL) 50 MG Tab   • ketorolac (TORADOL) 10 MG Tab   • EMGALITY 120 MG/ML Solution Auto-injector   • DULoxetine (CYMBALTA) 30 MG Cap DR Particles   • Mirabegron ER 50 MG TABLET SR 24 HR   • oxyCODONE-acetaminophen (PERCOCET) 5-325 MG Tab   • Ibuprofen 200 MG Cap   • asa/apap/caffeine (EXCEDRIN) 250-250-65 MG Tab   • BOTOX 200 UNITS Recon Soln   • pantoprazole (PROTONIX) 40 MG Tablet Delayed Response   • acetaminophen/caffeine/butalbital  325-40-50 mg (FIORICET) -40 MG Tab   • hydrocodone-acetaminophen (NORCO) 5-325 MG TABS per tablet   • omega-3 acid ethyl esters (LOVAZA) 1 GM capsule   • M-VIT PO     No current facility-administered medications for this visit.       Medication Allergy:  Allergies   Allergen Reactions   • Ativan Unspecified     Okay with small amount   • Hydromorphone Unspecified     Okay with small amount   • Ativan      High doses   • Hydromorphone Hcl    • Nkda [No Known Drug Allergy]        Review of systems:   Constitutional: denies fever, night sweats, weight loss.   Eyes: denies acute vision change, eye pain or secretion.   Ears, Nose, Mouth, Throat: denies nasal secretion, nasal bleeding, difficulty swallowing, hearing loss, tinnitus, vertigo, ear pain, acute dental problems, oral ulcers or lesions.   Endocrine: denies recent weight changes, heat or cold intolerance, polyuria, polydypsia, polyphagia,abnormal hair growth.  Cardiovascular: denies new onset of chest pain, palpitations, syncope, or dyspnea of exertion.  Pulmonary: denies shortness of breath, new onset of cough, hemoptysis, wheezing, chest pain or flu-like symptoms.   GI: denies nausea, vomiting, diarrhea, GI bleeding, change in appetite, abdominal pain, and change in bowel habits.  : denies dysuria, urinary incontinence, hematuria.  Heme/oncology: denies history of easy bruising or bleeding. No history of cancer, DVTor PE.  Allergy/immunology: denies hives/urticaria, or itching.   Dermatologic: denies new rash, or new skin lesions.  Musculoskeletal:denies joint swelling or pain, muscle pain, neck and back pain. Neurologic: denies headaches, acute visual changes, facial droopiness, muscle weakness (focal or generalized), paresthesias, anesthesia, ataxia, change in speech or language, memory loss, abnormal movements, seizures, loss of consciousness, or episodes of confusion.   Psychiatric: denies symptoms of depression, anxiety, hallucinations, mood swings  "or changes, suicidal or homicidal thoughts.     Physical examination:   Vitals:    05/02/22 1309   BP: 140/88   BP Location: Right arm   Patient Position: Sitting   BP Cuff Size: Adult   Pulse: 63   Resp: 16   Temp: 36.7 °C (98 °F)   TempSrc: Temporal   SpO2: 96%   Weight: 89.8 kg (198 lb)   Height: 1.702 m (5' 7\")     General: Patient in no acute distress, pleasant and cooperative.  HEENT: Normocephalic, no signs of acute trauma.   Neck: supple, no meningeal signs or carotid bruits. There is normal range of motion. No tenderness on exam.   Chest: clear to auscultation. No cough.   CV: RRR, no murmurs.   Skin: no signs of acute rashes or trauma.   Musculoskeletal: joints exhibit full range of motion, without any pain to palpation. There are no signs of joint or muscle swelling. There is no tenderness to deep palpation of muscles.   Psychiatric: No hallucinatory behavior. Denies symptoms of depression or suicidal ideation. Mood and affect appear normal on exam.     NEUROLOGICAL EXAM:   Mental status, orientation: Awake, alert and fully oriented.   Speech and language: speech is clear and fluent. The patient is able to name, repeat and comprehend.   Memory: There is intact recollection of recent and remote events.   Cranial nerve exam: Pupils are 3-4 mm bilaterally and equally reactive to light and accommodation. Visual fields are intact by confrontation. Fundoscopic exam was unremarkable. There is no nystagmus on primary or secondary gaze. Intact full EOM in all directions of gaze. Face appears symmetric. Sensation in the face is intact to light touch. Uvula is midline. Palate elevates symmetrically. Tongue is midline and without any signs of tongue biting or fasciculations. Sternocleidomastoid muscles exhibit is normal strength bilaterally. Shoulder shrug is intact bilaterally.   Motor exam: Strength is 5/5 in all extremities. Tone is normal.  Patient has resting and action tremor with some functional " components.  Sensory exam reveals normal sense of light touch, proprioception, vibration and pinprick in all extremities.   Deep tendon reflexes:  3+ throughout. Plantar responses are flexor. There is no clonus.   Coordination: shows a abnormal finger-nose-finger. Normal rapidly alternating movements.   Gait: The patient was able to get up from seated position on first attempt without requiring assistance. Found to be steady when walking. Movements were fluid with normal arm swing. The patient was able to turn without difficulties or tendency to fall. Romberg examination positive      ANCILLARY DATA REVIEWED:     Lab Data Review:  No results found for this or any previous visit (from the past 24 hour(s)).    Records reviewed: I have reviewed patient's previous records and laboratory testing.      Imaging: Imaging from last year was done which was stable.          ASSESSMENT AND PLAN:    1. Complex partial epilepsy, intractable (HCC)  Plan to decrease amount of Depakote to 50 mg twice daily with a new prescription sent to his pharmacy.  We discussed relaxation and breathing techniques and a slow pace lifestyle.  - divalproex ER (DEPAKOTE ER) 500 MG TABLET SR 24 HR; Take 1 tablet twice a day.  Dispense: 180 Tablet; Refill: 1    2. Tremor due to multiple drugs  Currently will continue with the propranolol as a primary treatment for his tremor which is likely drug-induced.        FOLLOW-UP:   6 months      My total time spent caring for the patient on the day of the encounter was 23 minutes.   This does not include time spent on separately billable procedures/tests.    EDUCATION AND COUNSELING:  -Discussed regular exercise program and prevention of cardiovascular disease, including stroke.   -Discussed healthy lifestyle, including: healthy diet (rich in fruits, vegetables, nuts and healthy oils); proper hydration, and adequate sleep hygiene (allowing 7-8 hrs of overnight sleep).        Melissa Bloch, MD  Clinical   of Neurology [facility].   Diplomate in Neurology.   Office: [phone]  Fax: [phone]

## 2022-06-26 DIAGNOSIS — G40.219 COMPLEX PARTIAL EPILEPSY, INTRACTABLE (HCC): ICD-10-CM

## 2022-06-28 RX ORDER — DIVALPROEX SODIUM 500 MG/1
TABLET, EXTENDED RELEASE ORAL
Qty: 540 TABLET | Refills: 1 | Status: SHIPPED | OUTPATIENT
Start: 2022-06-28 | End: 2022-11-07

## 2022-09-05 DIAGNOSIS — G25.1 TREMOR DUE TO MULTIPLE DRUGS: ICD-10-CM

## 2022-09-05 DIAGNOSIS — G43.019 INTRACTABLE MIGRAINE WITHOUT AURA AND WITHOUT STATUS MIGRAINOSUS: ICD-10-CM

## 2022-09-07 DIAGNOSIS — G43.019 INTRACTABLE MIGRAINE WITHOUT AURA AND WITHOUT STATUS MIGRAINOSUS: ICD-10-CM

## 2022-09-07 RX ORDER — TOPIRAMATE SPINKLE 25 MG/1
CAPSULE ORAL
Qty: 180 CAPSULE | Refills: 1 | Status: SHIPPED | OUTPATIENT
Start: 2022-09-07 | End: 2023-05-08

## 2022-09-07 RX ORDER — PROPRANOLOL HCL 60 MG
60 CAPSULE, EXTENDED RELEASE 24HR ORAL DAILY
Qty: 90 CAPSULE | Refills: 3 | Status: SHIPPED | OUTPATIENT
Start: 2022-09-07 | End: 2023-09-22 | Stop reason: SDUPTHER

## 2022-10-31 ENCOUNTER — TELEPHONE (OUTPATIENT)
Dept: NEUROLOGY | Facility: MEDICAL CENTER | Age: 67
End: 2022-10-31
Payer: COMMERCIAL

## 2022-11-07 ENCOUNTER — OFFICE VISIT (OUTPATIENT)
Dept: NEUROLOGY | Facility: MEDICAL CENTER | Age: 67
End: 2022-11-07
Attending: PSYCHIATRY & NEUROLOGY
Payer: COMMERCIAL

## 2022-11-07 VITALS
OXYGEN SATURATION: 94 % | SYSTOLIC BLOOD PRESSURE: 122 MMHG | HEIGHT: 66 IN | HEART RATE: 58 BPM | DIASTOLIC BLOOD PRESSURE: 92 MMHG | RESPIRATION RATE: 18 BRPM | WEIGHT: 200.62 LBS | BODY MASS INDEX: 32.24 KG/M2 | TEMPERATURE: 98.9 F

## 2022-11-07 DIAGNOSIS — G40.509 NONINTRACTABLE EPILEPSY DUE TO EXTERNAL CAUSES, WITHOUT STATUS EPILEPTICUS (HCC): ICD-10-CM

## 2022-11-07 DIAGNOSIS — F03.A4 MILD DEMENTIA WITH ANXIETY, UNSPECIFIED DEMENTIA TYPE (HCC): ICD-10-CM

## 2022-11-07 DIAGNOSIS — G25.1 TREMOR DUE TO MULTIPLE DRUGS: ICD-10-CM

## 2022-11-07 DIAGNOSIS — G43.719 INTRACTABLE CHRONIC MIGRAINE WITHOUT AURA AND WITHOUT STATUS MIGRAINOSUS: ICD-10-CM

## 2022-11-07 PROCEDURE — 99214 OFFICE O/P EST MOD 30 MIN: CPT | Performed by: PSYCHIATRY & NEUROLOGY

## 2022-11-07 RX ORDER — DIVALPROEX SODIUM 250 MG/1
250 TABLET, DELAYED RELEASE ORAL 2 TIMES DAILY
Qty: 180 TABLET | Refills: 0 | Status: SHIPPED | OUTPATIENT
Start: 2022-11-07 | End: 2023-02-05

## 2022-11-07 ASSESSMENT — FIBROSIS 4 INDEX: FIB4 SCORE: 1.45

## 2022-11-07 NOTE — ASSESSMENT & PLAN NOTE
Pt has had some increase in his HA with the storms as they come in over the winter.  Patient states that he does get benefit from his current migraine medications.

## 2022-11-08 NOTE — PROGRESS NOTES
Chief Complaint   Patient presents with    Follow-Up     Hydrocephalus       Problem List Items Addressed This Visit       Tremor due to multiple drugs     Pt still has hand tremor from the Depakote and he wants to stop the 500mg and move to the 250 mg.         Headache, chronic migraine without aura, intractable     Pt has had some increase in his HA with the storms as they come in over the winter.  Patient states that he does get benefit from his current migraine medications.         Relevant Medications    divalproex (DEPAKOTE) 250 MG Tablet Delayed Response    Epileptic seizure (HCC)     Patient has much better control and was only had 3 small events since last visit.         Relevant Medications    divalproex (DEPAKOTE) 250 MG Tablet Delayed Response    Dementia (HCC)     Pt has been stable and has been able to help a lot with the remodel.         Relevant Medications    divalproex (DEPAKOTE) 250 MG Tablet Delayed Response       History of present illness:  Eriberto Young 67 y.o. male presents today for treatment of multiple neurologic issues.    Past medical history:   Past Medical History:   Diagnosis Date    Anxiety     Chronic pain     Complex partial epilepsy, intractable (HCC)     Depression     Diabetes (HCC)     Head injuries     Hydrocephalus (HCC)     w/ shunt    Post concussion syndrome 11/10/2014    Self-injurious behavior     Suicide attempt (HCC)        Past surgical history:   Past Surgical History:   Procedure Laterality Date    ABDOMINAL EXPLORATION      ACHILLES TENDON REPAIR      CRANIOTOMY      HERNIA REP HIATAL      times 2    HERNIA REPAIR      LAMINOTOMY         Family history:   Family History   Problem Relation Age of Onset    Alcohol abuse Father     ADD / ADHD Maternal Grandfather     ADD / ADHD Maternal Grandmother     Dementia Maternal Grandmother        Social history:   Social History     Socioeconomic History    Marital status:      Spouse name: Not on file    Number of  children: Not on file    Years of education: Not on file    Highest education level: Not on file   Occupational History    Not on file   Tobacco Use    Smoking status: Never    Smokeless tobacco: Never   Substance and Sexual Activity    Alcohol use: Yes     Alcohol/week: 1.2 oz     Types: 2 Glasses of wine per week    Drug use: No    Sexual activity: Not on file   Other Topics Concern    Not on file   Social History Narrative    Not on file     Social Determinants of Health     Financial Resource Strain: Not on file   Food Insecurity: Not on file   Transportation Needs: Not on file   Physical Activity: Not on file   Stress: Not on file   Social Connections: Not on file   Intimate Partner Violence: Not on file   Housing Stability: Not on file       Current medications:   Current Outpatient Medications   Medication    divalproex (DEPAKOTE) 250 MG Tablet Delayed Response    propranolol LA (INDERAL LA) 60 MG CAPSULE SR 24 HR    topiramate (TOPAMAX) 25 MG capsule    atorvastatin (LIPITOR) 10 MG Tab    metformin (GLUCOPHAGE) 1000 MG tablet    Memantine HCl ER (NAMENDA) 28 MG CAPSULE SR 24 HR    Zoster Vac Recomb Adjuvanted (SHINGRIX) 50 MCG/0.5ML Recon Susp    Influenza Vac Subunit Quad (FLUCELVAX QUADRIVALENT) 0.5 ML Suspension Prefilled Syringe injection    loperamide (IMODIUM) 2 MG Cap    OnabotulinumtoxinA (BOTOX INJ)    DULoxetine (CYMBALTA) 60 MG Cap DR Particles delayed-release capsule    therapeutic multivitamin-minerals (THERAGRAN-M) Tab    omega-3 acid ethyl esters (LOVAZA) 1 GM capsule    traZODone (DESYREL) 50 MG Tab    ketorolac (TORADOL) 10 MG Tab    EMGALITY 120 MG/ML Solution Auto-injector    DULoxetine (CYMBALTA) 30 MG Cap DR Particles    Mirabegron ER 50 MG TABLET SR 24 HR    oxyCODONE-acetaminophen (PERCOCET) 5-325 MG Tab    Ibuprofen 200 MG Cap    asa/apap/caffeine (EXCEDRIN) 250-250-65 MG Tab    BOTOX 200 UNITS Recon Soln    pantoprazole (PROTONIX) 40 MG Tablet Delayed Response     acetaminophen/caffeine/butalbital 325-40-50 mg (FIORICET) -40 MG Tab    hydrocodone-acetaminophen (NORCO) 5-325 MG TABS per tablet    omega-3 acid ethyl esters (LOVAZA) 1 GM capsule    M-VIT PO     No current facility-administered medications for this visit.       Medication Allergy:  Allergies   Allergen Reactions    Ativan Unspecified     Okay with small amount    Hydromorphone Unspecified     Okay with small amount    Ativan      High doses    Hydromorphone Hcl     Nkda [No Known Drug Allergy]        Review of systems:   Constitutional: denies fever, night sweats, weight loss.   Eyes: denies acute vision change, eye pain or secretion.   Ears, Nose, Mouth, Throat: denies nasal secretion, nasal bleeding, difficulty swallowing, hearing loss, tinnitus, vertigo, ear pain, acute dental problems, oral ulcers or lesions.   Endocrine: denies recent weight changes, heat or cold intolerance, polyuria, polydypsia, polyphagia,abnormal hair growth.  Cardiovascular: denies new onset of chest pain, palpitations, syncope, or dyspnea of exertion.  Pulmonary: denies shortness of breath, new onset of cough, hemoptysis, wheezing, chest pain or flu-like symptoms.   GI: denies nausea, vomiting, diarrhea, GI bleeding, change in appetite, abdominal pain, and change in bowel habits.  : denies dysuria, urinary incontinence, hematuria.  Heme/oncology: denies history of easy bruising or bleeding. No history of cancer, DVTor PE.  Allergy/immunology: denies hives/urticaria, or itching.   Dermatologic: denies new rash, or new skin lesions.  Musculoskeletal:denies joint swelling or pain, muscle pain, neck and back pain. Neurologic: denies headaches, acute visual changes, facial droopiness, muscle weakness (focal or generalized), paresthesias, anesthesia, ataxia, change in speech or language, memory loss, abnormal movements, seizures, loss of consciousness, or episodes of confusion.   Psychiatric: denies symptoms of depression, anxiety,  "hallucinations, mood swings or changes, suicidal or homicidal thoughts.     Physical examination:   Vitals:    11/07/22 1353   BP: (!) 122/92   BP Location: Left arm   Patient Position: Standing   BP Cuff Size: Adult   Pulse: (!) 58   Resp: 18   Temp: 37.2 °C (98.9 °F)   TempSrc: Temporal   SpO2: 94%   Weight: 91 kg (200 lb 9.9 oz)   Height: 1.676 m (5' 6\")     General: Patient in no acute distress, pleasant and cooperative.  HEENT: Normocephalic, no signs of acute trauma.   Neck: supple, no meningeal signs or carotid bruits. There is normal range of motion. No tenderness on exam.   Chest: clear to auscultation. No cough.   CV: RRR, no murmurs.   Skin: no signs of acute rashes or trauma.   Musculoskeletal: joints exhibit full range of motion, without any pain to palpation. There are no signs of joint or muscle swelling. There is no tenderness to deep palpation of muscles.   Psychiatric: No hallucinatory behavior. Denies symptoms of depression or suicidal ideation. Mood and affect appear normal on exam.     NEUROLOGICAL EXAM:   Mental status, orientation: Awake, alert and fully oriented.   Speech and language: speech is clear and fluent. The patient is able to name, repeat and comprehend.   Memory: There is intact recollection of recent and remote events.   Cranial nerve exam: Pupils are 3-4 mm bilaterally and equally reactive to light and accommodation. Visual fields are intact by confrontation. Fundoscopic exam was unremarkable. There is no nystagmus on primary or secondary gaze. Intact full EOM in all directions of gaze. Face appears symmetric. Sensation in the face is intact to light touch. Uvula is midline. Palate elevates symmetrically. Tongue is midline and without any signs of tongue biting or fasciculations. Sternocleidomastoid muscles exhibit is normal strength bilaterally. Shoulder shrug is intact bilaterally.   Motor exam: Strength is 5/5 in all extremities. Tone is normal.  Patient has resting " tremor.  Sensory exam reveals normal sense of light touch, proprioception, vibration and pinprick in all extremities.   Deep tendon reflexes:  2+ throughout. Plantar responses are flexor. There is no clonus.   Coordination: shows a normal finger-nose-finger. Normal rapidly alternating movements.   Gait: The patient was able to get up from seated position on first attempt without requiring assistance. Found to be steady when walking. Movements were fluid with normal arm swing. The patient was able to turn without difficulties or tendency to fall. Romberg examination positive      ANCILLARY DATA REVIEWED:     Lab Data Review:  No results found for this or any previous visit (from the past 24 hour(s)).    Records reviewed: Reviewed patient's chart from other places      Imaging: I reviewed last imaging studies consistent with hydrocephalus.          ASSESSMENT AND PLAN:    1. Mild dementia with anxiety, unspecified dementia type  This has been stable we will continue him on the memantine    2. Intractable chronic migraine without aura and without status migrainosus  Patient with chronic refractory headaches but the Depakote does cause some tremor so we will try and decrease the dose to 250 in the a.m. and 500 nightly.  Patient can decrease further to 250 twice daily if he does well with this dose.  - divalproex (DEPAKOTE) 250 MG Tablet Delayed Response; Take 1 Tablet by mouth 2 times a day for 90 days.  Dispense: 180 Tablet; Refill: 0    3. Tremor due to multiple drugs  Continue propranolol.    4. Nonintractable epilepsy due to external causes, without status epilepticus (HCC)  Continue topiramate and we will decrease the dose of the divalproex as he has done well.  Symptom management with stress relief does help also.        FOLLOW-UP:   Return in about 6 months (around 5/7/2023).       I spent 31 minutes with this patient, over fifty percent was spent counseling patient on their condition, best management practices,  reviewing test results and risks and benefits of treatment.     EDUCATION AND COUNSELING:  -Discussed regular exercise program and prevention of cardiovascular disease, including stroke.   -Discussed healthy lifestyle, including: healthy diet (rich in fruits, vegetables, nuts and healthy oils); proper hydration, and adequate sleep hygiene (allowing 7-8 hrs of overnight sleep).        Melissa Bloch, MD  Clinical  of Neurology [facility] [email or web address].   Diplomate in Neurology.   Office: [phone]  Fax: [phone]

## 2022-12-12 ENCOUNTER — OFFICE VISIT (OUTPATIENT)
Dept: SLEEP MEDICINE | Facility: MEDICAL CENTER | Age: 67
End: 2022-12-12
Payer: COMMERCIAL

## 2022-12-12 VITALS
SYSTOLIC BLOOD PRESSURE: 144 MMHG | OXYGEN SATURATION: 94 % | BODY MASS INDEX: 31.02 KG/M2 | WEIGHT: 193 LBS | HEART RATE: 70 BPM | RESPIRATION RATE: 14 BRPM | DIASTOLIC BLOOD PRESSURE: 86 MMHG | HEIGHT: 66 IN

## 2022-12-12 DIAGNOSIS — G47.33 OSA (OBSTRUCTIVE SLEEP APNEA): Primary | ICD-10-CM

## 2022-12-12 DIAGNOSIS — F51.04 CHRONIC INSOMNIA: ICD-10-CM

## 2022-12-12 PROCEDURE — 99204 OFFICE O/P NEW MOD 45 MIN: CPT | Performed by: STUDENT IN AN ORGANIZED HEALTH CARE EDUCATION/TRAINING PROGRAM

## 2022-12-12 ASSESSMENT — FIBROSIS 4 INDEX: FIB4 SCORE: 1.45

## 2022-12-12 NOTE — PROGRESS NOTES
[facility] Consult Note     Date: 12/12/2022 / Time: 12:51 PM      Thank you for requesting a sleep medicine consultation on Eriberto Young at the sleep center. Presents today with the chief complaints of snoring, trouble staying asleep. He is referred by Vasile Walker M.D.  [address] [facility]  [address] [place] [address] for evaluation and treatment of obstructive sleep apnea.    HISTORY OF PRESENT ILLNESS:     Eriberto Young is a 67 y.o. male with epilepsy, history of TBI, tremor, migraines, diabetes mellitus type 2, GERD, obesity, and obstructive sleep apnea.  Presents to Sleep Clinic for evaluation of obstructive sleep apnea.     He was diagnosed several years ago with obstructive sleep apnea.  His wife who accompanies at today's visit reports that it was borderline at that time and there was debate on treatment.  This is based off of a in lab sleep study.  He recently underwent a 2 night home sleep study through [facility] in June 2022.  Study on night 1 showed potentially severe obstructive sleep apnea with overall AHI of 30, however only wore the device for just over 2 hours and 18 minutes.  On night 2 he wore the device for the full night of sleep.  On this night he met criteria for mild obstructive sleep apnea with an overall AHI of 9.  His positive sleep study is what led to today's visit from his PCP.    He does have concern regarding his sleep and that he wakes up approximately 2 times a night.  He will have trouble getting back to sleep 5 to 6 days a week.  This mainly occurs if he wakes up close to his wake time.  If he wakes up anytime after 330 he will be unable to get back to sleep.    He does share the bed with his wife.  They do endorse snoring as well as choking and gasping.  In the past there was episodes of witnessed apneas.  He does wake up with a headache on occasion.  He attributes this to his migraines which have improved with his medication  "from neurology.  He does feel rested when he wakes up.  However during the day he does have trouble with concentration and memory.  He does have fatigue at times.    As per supplemental questionnaire to be scanned or imported into chart:    [place] Sleepiness Score: 7    Sleep Schedule  Bedtime: Weekday & Weekend 10pm  Wake time: Weekday 4-6am Weekend 4-6am  Sleep-onset latency: 15-20min   Awakenings from sleep: 2  Difficulty falling back asleep: trouble getting back to sleep if near wake time 5-6 day   Bedroom partner: yes  Naps: Yes, 3 times a week, 1 hr to 3 hours. Refreshing     DAYTIME SYMPTOMS:   Excessive daytime sleepiness: No   Daytime fatigue: Yes  Difficulty concentrating: Yes  Memory problems: Yes  Irritability:No   Work/school performance issues: No , not working   Sleepiness with driving: No , rarely drives   Caffeine/stimulant use: Yes 2 in morning   Alcohol use:Yes, How Many? 1-2 drinks a month      SLEEP RELATED SYMPTOMS  Snoring: Yes  Witnessed apnea or gasping/choking: Yes, gasping   Dry mouth or mouth breathing: No   Sweating: No   Teeth grinding/biting: No   Morning headaches: Yes  Refreshed Upon Awakening: Yes     SLEEP RELATED BEHAVIORS:  Parasomnias (walking, talking, eating, violence): No   Leg kicking: No   Restless legs - \"urge to move\": No   Nightmares: No  Recurrent: No   Dream enactment: No      NARCOLEPSY:  Cataplexy: No   Sleep paralysis: No   Sleep attacks: No   Hypnagogic/hypnopompic hallucinations: No     MEDICAL HISTORY  Past Medical History:   Diagnosis Date    Anxiety     Chronic pain     Complex partial epilepsy, intractable ([facility])     Depression     Diabetes ([facility])     Head injuries     Hydrocephalus ([facility])     w/ shunt    Post concussion syndrome 11/10/2014    Self-injurious behavior     Suicide attempt ([facility])         SURGICAL HISTORY  Past Surgical History:   Procedure Laterality Date    ABDOMINAL EXPLORATION      ACHILLES TENDON REPAIR      CRANIOTOMY      HERNIA REP HIATAL   "    times 2    HERNIA REPAIR      LAMINOTOMY          FAMILY HISTORY  Family History   Problem Relation Age of Onset    Alcohol abuse Father     ADD / ADHD Maternal Grandfather     ADD / ADHD Maternal Grandmother     Dementia Maternal Grandmother        SOCIAL HISTORY  Social History     Socioeconomic History    Marital status:    Tobacco Use    Smoking status: Never    Smokeless tobacco: Never   Vaping Use    Vaping Use: Never used   Substance and Sexual Activity    Alcohol use: Yes     Alcohol/week: 1.2 oz     Types: 2 Glasses of wine per week    Drug use: No        Occupation: Not Working     CURRENT MEDICATIONS  Current Outpatient Medications   Medication Sig Dispense Refill    divalproex (DEPAKOTE) 250 MG Tablet Delayed Response Take 1 Tablet by mouth 2 times a day for 90 days. 180 Tablet 0    propranolol LA (INDERAL LA) 60 MG CAPSULE SR 24 HR TAKE 1 CAPSULE BY MOUTH EVERY DAY 90 Capsule 3    topiramate (TOPAMAX) 25 MG capsule TAKE 1 CAPSULE BY MOUTH TWICE A  Capsule 1    atorvastatin (LIPITOR) 10 MG Tab Take 1 Tablet by mouth every evening.      metformin (GLUCOPHAGE) 1000 MG tablet Take 1 Tablet by mouth 2 times a day with meals.      Memantine HCl ER (NAMENDA) 28 MG CAPSULE SR 24 HR TAKE 1 CAPSULE BY MOUTH EVERY DAY 90 Capsule 3    loperamide (IMODIUM) 2 MG Cap Take 1 capsule by mouth 4 times a day as needed for Diarrhea. 10 capsule 0    OnabotulinumtoxinA (BOTOX INJ) Inject  as directed see administration instructions. Every 12 weeks  Next due 7/16/21      DULoxetine (CYMBALTA) 60 MG Cap DR Particles delayed-release capsule Take 1 Capsule by mouth every evening.      therapeutic multivitamin-minerals (THERAGRAN-M) Tab Take 1 Tablet by mouth every day.      omega-3 acid ethyl esters (LOVAZA) 1 GM capsule Take 1 Capsule by mouth 2 times a day.      traZODone (DESYREL) 50 MG Tab Take 1 Tablet by mouth at bedtime as needed for Sleep.      ketorolac (TORADOL) 10 MG Tab Take 1 Tab by mouth every four  hours as needed. 10 Tab 5    EMGALITY 120 MG/ML Solution Auto-injector 120 mg by Injection route.      Mirabegron ER 50 MG TABLET SR 24 HR Take  by mouth.      oxyCODONE-acetaminophen (PERCOCET) 5-325 MG Tab Take 1-2 Tabs by mouth every four hours as needed.      Ibuprofen 200 MG Cap None Entered      asa/apap/caffeine (EXCEDRIN) 250-250-65 MG Tab Take  by mouth.      BOTOX 200 UNITS Recon Soln       pantoprazole (PROTONIX) 40 MG Tablet Delayed Response TAKE 1 TABLET BY MOUTH DAILY, 30 MIN BEFORE BREAKFAST  1    acetaminophen/caffeine/butalbital 325-40-50 mg (FIORICET) -40 MG Tab Take 1 Tab by mouth every four hours as needed for Headache. 30 Tab 1    hydrocodone-acetaminophen (NORCO) 5-325 MG TABS per tablet Take 1-2 Tabs by mouth every four hours as needed. 30 Tab 5    M-VIT PO       Zoster Vac Recomb Adjuvanted (SHINGRIX) 50 MCG/0.5ML Recon Susp Shingrix (PF) 50 mcg/0.5 mL intramuscular suspension, kit   PHARMACY ADMINISTERED (Patient not taking: Reported on 12/12/2022)      Influenza Vac Subunit Quad (FLUCELVAX QUADRIVALENT) 0.5 ML Suspension Prefilled Syringe injection Flucelvax Quad 0590-8808 (PF) 60 mcg (15 mcg x 4)/0.5 mL IM syringe   PHARMACY ADMINISTERED (Patient not taking: Reported on 12/12/2022)      DULoxetine (CYMBALTA) 30 MG Cap DR Particles Take 60 mg by mouth every day. (Patient not taking: Reported on 12/12/2022)      omega-3 acid ethyl esters (LOVAZA) 1 GM capsule Take 1 Capsule by mouth every day. (Patient not taking: Reported on 12/12/2022)       No current facility-administered medications for this visit.       REVIEW OF SYSTEMS  Constitutional: Denies fevers, Denies weight changes  Ears/Nose/Throat/Mouth: Denies nasal congestion or sore throat   Cardiovascular: Denies chest pain  Respiratory: Denies shortness of breath, Denies cough  Gastrointestinal/Hepatic: Denies nausea, vomiting  Sleep: see HPI    Physical Examination:  Vitals/ General Appearance:   Weight/BMI: Body mass index is  "31.15 kg/m².  Ht 1.676 m (5' 6\")   Wt 87.5 kg (193 lb)   Vitals:    12/12/22 1246   Weight: 87.5 kg (193 lb)   Height: 1.676 m (5' 6\")       Pt. is alert and oriented to time, place and person. Cooperative and in no apparent distress.     Constitutional: Alert, no distress, well-groomed.  Skin: No rashes in visible areas.  Eye: Round. Conjunctiva clear, lids normal. No icterus.   ENT EXAM  Nasal alae/valves collapsible: Yes  Nasal septum deviation: Yes  Nasal turbinate hypertrophy: Left: Grade 1   Right: Grade 1  Hard palate narrow: Yes  Hard palate high: Yes  Soft palate/uvula (Mallampati score): 4  Tongue Scalloping: No   Retrognathia: Yes  Micrognathia: No   Cardiovascular:no murmus/gallops/rubs, normal S1 and S2 heart sounds, regular rate and rhythm  Pulmonary:Clear to auscultation, No wheezes, No crackles.  Neurologic:Awake, alert and oriented x 3, Normal age appropriate gait, No involuntary motions.  Extremities: No clubbing, cyanosis, or edema       ASSESSMENT AND PLAN   Eriberto Young is a 67 y.o. male with epilepsy, history of TBI, tremor, migraines, diabetes mellitus type 2, GERD, obesity, and obstructive sleep apnea.  Presents to Sleep Clinic for evaluation of obstructive sleep apnea.     1. Obstructive sleep apnea   Reviewed previous HST with patient showing an AHI of 9.   Based on sleep study and symptoms meets criteria for mild obstructive sleep apnea.   Symptoms include fatigue, brain fog, snoring, gasping in sleep.    He has significant comorbidities which can be worsened by untreated sleep apnea including epilepsy, migraines, GERD, fatigue, and dementia.    We discussed the pathophysiology of obstructive sleep apnea (CHIRAG) and risk factors for the disease. We also discussed possible consequences of untreated CHIRAG, including excessive daytime sleepiness and fatigue, cognitive dysfunction, cardiovascular complications such as elevated blood pressure, heart attacks, cardiac arrhythmias, and strokes. We " discussed how CHIRAG typically gets worse with age. We discussed treatment options for CHIRAG, including the gold standard therapy (PAP), alternative options such as a mandibular advancement device (custom-made oral appliances) and surgeries.     We will proceed CPAP therapy.     RECOMMENDATIONS  -Start Auto CPAP at pressures 4-7 cm H2O  -Discussed importance of adherence/compliance   -Prescription generated for supplies   -Patient counseled to avoid driving when sleepy. Encouraged to anticipate sleepiness, consider taking a 10 min nap prior to driving, alternate with another , or pull over if sleepy while driving  -Advised to contact our office or myself with any questions via [employer]    2. Chronic Insomnia   With awakenings with difficulty falling back asleep and feeling this is impacting daily functioning for years meets criteria for chronic insomnia. Discussed potential causes of insomnia and importance of having a routine around bedtime. Advised to avoid laying in bed for more then 20-30 min if unable to fall asleep.   Reviewed that untreated sleep apnea can worsen sleep maintenance insomnia.  Advised that treating his sleep apnea may help with his awakenings as well as his ability to get back to sleep.    RECOMMENDATIONS  -Maintain a regular sleep schedule  -Avoid caffeinated beverages after lunch, and alcohol in late afternoon and evening  -Avoid prolonged use of light-emitting screens before bedtime  -Exercise regularly for at least 20 minutes, preferably more than four to five hours prior to bedtime  -Avoid daytime naps, especially if they are longer than 20 to 30 minutes or occur late in the day  -Advised to contact our office or myself with any questions via [employer]        Regarding treatment of other past medical problems and general health maintenance,  Pt is urged to follow up with PCP.      Please note portions of this record was created using voice recognition software. I have made every reasonable  attempt to correct obvious errors, but I expect that there are errors of grammar and possibly content I did not discover before finalizing the note.

## 2023-03-23 NOTE — PROGRESS NOTES
[facility] Follow-up Visit    Date of Visit: 4/3/2023     CC:  Follow-up for CHIRAG management      HPI:  Eriberto Young is a very pleasant 67 y.o. year old male never smoker, with a PMHx of epilepsy, history of TBI, tremor, migraines, diabetes mellitus type 2, GERD, obesity, and CHIRAG who presented to the Sleep Clinic for a regular follow up. Last seen in the office on 12/12/2022 with Dr. Dias.     Patient presents for first compliance.  Patient states that he was previously using a nasal cannula, which gave him a sore inside his nose, so he is unable to use his machine for several days while he waited for nasal pillows.  He states that he does not feel that his sleep is more restful on the CPAP machine and is experiencing daytime drowsiness.  He also has a dry mouth that is alleviated with water.  He also has minimal mask leak.  He denies any significant morning headaches, issues falling asleep, snoring, gasping, apneas, palpitations, aerophagia, skin irritation.  He goes to bed between 10-11 PM and wakes up at 7 AM.  He has 1 awakening at night to use bathroom and or rarely have issues falling back asleep where he is not able to.  He will occasionally nap for 30-60 minutes.     DME provider: Nichole  Device: ResMed AirSense 11  Settings: ResMed AirSense 11  When:  February 2023  Mask: Nasal pillows  Chin strap: No     Cleaning regimen: weekly with dish soap and water    Compliance:  Compliance data reviewed showing 53% usage > 4hours in last 30 days. Average AHI 20.5 (CI: 13.6 & OI: 6.5) events/hour. 95% pressure 9.9 CWP. 95% leaks 14.3 L/min. Patient continues to use and benefit from machine.      Sleep History:  Per Dr. Dias' note:  He recently [facility]  Study on night 1 showed potentially severe obstructive sleep apnea with overall AHI of 30, however only wore the device for just over 2 hours and 18 minutes.  On night 2 he  wore the device for the full night of sleep.  On this night he met criteria for mild obstructive sleep apnea with an overall AHI of 9.  Sleep study not on file.     Patient Active Problem List    Diagnosis Date Noted    CHIRAG on CPAP 04/03/2023    Swelling of limb, left 07/06/2021    Hypomagnesemia 07/05/2021    Trauma 07/02/2021    Hydrocephalus (HCC) 07/02/2021    Epileptic seizure (HCC) 07/02/2021    Dementia (HCC) 07/02/2021    Obesity (BMI 30.0-34.9) 07/02/2021    Adjustment reaction with anxiety and depression 11/07/2017    Dementia associated with other underlying disease without behavioral disturbance (HCC) 10/25/2016    Mosquera's esophagus 04/26/2016    Post concussion syndrome 11/10/2014    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 05/05/2014    Headache, chronic migraine without aura, intractable 05/05/2014    Complex partial epilepsy, intractable (HCC) 12/02/2013    Headache, tension type, chronic 12/02/2013    Tremor due to multiple drugs 12/02/2013     (ventriculoperitoneal) shunt status 12/02/2013    DDD (degenerative disc disease), cervical 12/02/2013     Past Medical History:   Diagnosis Date    Anxiety     Chronic pain     Complex partial epilepsy, intractable (HCC)     Depression     Diabetes ([facility])     Head injuries     Hydrocephalus ([facility])     w/ shunt    Post concussion syndrome 11/10/2014    Self-injurious behavior     Suicide attempt ([facility])       Past Surgical History:   Procedure Laterality Date    ABDOMINAL EXPLORATION      ACHILLES TENDON REPAIR      CRANIOTOMY      HERNIA REP HIATAL      times 2    HERNIA REPAIR      LAMINOTOMY       Family History   Problem Relation Age of Onset    Alcohol abuse Father     ADD / ADHD Maternal Grandfather     ADD / ADHD Maternal Grandmother     Dementia Maternal Grandmother      Social History     Socioeconomic History    Marital status:      Spouse name: Not on file    Number of children: Not on file    Years of education: Not on file    Highest education level:  Not on file   Occupational History    Not on file   Tobacco Use    Smoking status: Never    Smokeless tobacco: Never   Vaping Use    Vaping Use: Never used   Substance and Sexual Activity    Alcohol use: Yes     Alcohol/week: 1.2 oz     Types: 2 Glasses of wine per week    Drug use: No    Sexual activity: Not on file   Other Topics Concern    Not on file   Social History Narrative    Not on file     Social Determinants of Health     Financial Resource Strain: Not on file   Food Insecurity: Not on file   Transportation Needs: Not on file   Physical Activity: Not on file   Stress: Not on file   Social Connections: Not on file   Intimate Partner Violence: Not on file   Housing Stability: Not on file     Current Outpatient Medications   Medication Sig Dispense Refill    propranolol LA (INDERAL LA) 60 MG CAPSULE SR 24 HR TAKE 1 CAPSULE BY MOUTH EVERY DAY 90 Capsule 3    topiramate (TOPAMAX) 25 MG capsule TAKE 1 CAPSULE BY MOUTH TWICE A  Capsule 1    atorvastatin (LIPITOR) 10 MG Tab Take 1 Tablet by mouth every evening.      metformin (GLUCOPHAGE) 1000 MG tablet Take 1 Tablet by mouth 2 times a day with meals.      Memantine HCl ER (NAMENDA) 28 MG CAPSULE SR 24 HR TAKE 1 CAPSULE BY MOUTH EVERY DAY 90 Capsule 3    loperamide (IMODIUM) 2 MG Cap Take 1 capsule by mouth 4 times a day as needed for Diarrhea. 10 capsule 0    DULoxetine (CYMBALTA) 60 MG Cap DR Particles delayed-release capsule Take 1 Capsule by mouth every evening.      therapeutic multivitamin-minerals (THERAGRAN-M) Tab Take 1 Tablet by mouth every day.      omega-3 acid ethyl esters (LOVAZA) 1 GM capsule Take 1 Capsule by mouth 2 times a day.      traZODone (DESYREL) 50 MG Tab Take 1 Tablet by mouth at bedtime as needed for Sleep.      ketorolac (TORADOL) 10 MG Tab Take 1 Tab by mouth every four hours as needed. 10 Tab 5    EMGALITY 120 MG/ML Solution Auto-injector 120 mg by Injection route.      DULoxetine (CYMBALTA) 30 MG Cap DR Particles Take 60 mg  by mouth every day.      Mirabegron ER 50 MG TABLET SR 24 HR Take  by mouth.      oxyCODONE-acetaminophen (PERCOCET) 5-325 MG Tab Take 1-2 Tabs by mouth every four hours as needed.      Ibuprofen 200 MG Cap None Entered      asa/apap/caffeine (EXCEDRIN) 250-250-65 MG Tab Take  by mouth.      BOTOX 200 UNITS Recon Soln       pantoprazole (PROTONIX) 40 MG Tablet Delayed Response TAKE 1 TABLET BY MOUTH DAILY, 30 MIN BEFORE BREAKFAST  1    acetaminophen/caffeine/butalbital 325-40-50 mg (FIORICET) -40 MG Tab Take 1 Tab by mouth every four hours as needed for Headache. 30 Tab 1    hydrocodone-acetaminophen (NORCO) 5-325 MG TABS per tablet Take 1-2 Tabs by mouth every four hours as needed. 30 Tab 5    omega-3 acid ethyl esters (LOVAZA) 1 GM capsule Take 1,000 mg by mouth every day.      M-VIT PO       Zoster Vac Recomb Adjuvanted (SHINGRIX) 50 MCG/0.5ML Recon Susp Shingrix (PF) 50 mcg/0.5 mL intramuscular suspension, kit   PHARMACY ADMINISTERED (Patient not taking: Reported on 4/3/2023)      Influenza Vac Subunit Quad (FLUCELVAX QUADRIVALENT) 0.5 ML Suspension Prefilled Syringe injection Flucelvax Quad [id number] (PF) 60 mcg (15 mcg x 4)/0.5 mL IM syringe   PHARMACY ADMINISTERED (Patient not taking: Reported on 12/12/2022)      OnabotulinumtoxinA (BOTOX INJ) Inject  as directed see administration instructions. Every 12 weeks  Next due 7/16/21       No current facility-administered medications for this visit.      ALLERGIES: Ativan, Hydromorphone, Ativan, Hydromorphone hcl, and Nkda [no known drug allergy]    ROS:  Constitutional: Denies fever, chills, sweats,  weight loss, fatigue  Cardiovascular: Denies chest pain, tightness, palpitations, swelling in legs/feet  Respiratory: Denies shortness of breath, cough, sputum, wheezing, painful breathing   Sleep: per HPI  Gastrointestinal: Denies  difficulty swallowing, nausea, abdominal pain, diarrhea, constipation, heartburn.  Musculoskeletal: Denies painful joints, sore  "muscles,       PHYSICAL EXAM:  /88 (BP Location: Right arm, Patient Position: Sitting, BP Cuff Size: Adult)   Pulse 70   Resp 16   Ht 1.664 m (5' 5.5\")   Wt 92.1 kg (203 lb)   SpO2 95% Comment: room air at rest  BMI 33.27 kg/m²   Appearance: Well-nourished, well-developed, no acute distress  Eyes:  No scleral icterus , EOMI  ENMT: No redness of the oropharynx  Lung auscultation:  No wheezes rhonchi rubs or rales  Cardiac: No murmurs, rubs, or gallops; regular rhythm, normal rate; no edema  Musculoskeletal:  Grossly normal; gait and station normal; digits and nails normal  Skin:  No rashes, petechiae, cyanosis  Neurologic: without focal signs; oriented to person, time, place, and purpose; judgement intact  Psychiatric:  No depression, anxiety, agitation  Mallampati score: Class III    Assessment and Plan:    The medical record was reviewed.    Diagnostic and titration nocturnal polysomnogram's, home sleep apnea tests, continuous nocturnal oximetry results, multiple sleep latency tests, and compliance reports reviewed.    Problem List Items Addressed This Visit       CHIRAG on CPAP     Sleep Apnea:    The pathophysiology of sleep anea and the increased risk of cardiovascular morbidity from untreated sleep apnea is discussed in detail with the patient.  Urged to avoid supine sleep, weight gain and alcoholic beverages since all of these can worsen sleep apnea. Cautioned against drowsy driving. If feeling sleepy while driving, pull over for a break/nap, rather than persist on the road, in the interest of own safety and that of others on the road.    Compliance download was reviewed and discussed with the patient.   Patient does have an AHI score of 20.5 with a central index of 13.6.  We will fix the patient's pressure to 9 CWP.  Patient was advised to adhere to compliance requirements.    - Compliance was reinforced  - Recommended the patient against the use of Ozone , such as SoClean  - Clean supplies a " couple times a week with dish soap and water and air dry  - Recommended the patient change out supplies as recommended for best mask fit and usage of the machine  - Advised patient to reach out via MyChart if any questions or concerns should arise.   - Equipment replacement schedule:  Mask cushion every month  Nasal pillows 2 times per month  Mask every 6 months  Head gear every 6 months  Tubing every 3 months  Ultra-fine filters 2 times per month  Foam filter every 6 months  Humidifier chamber every 6 months    Has been advised to continue the current CPAP, clean equipment frequently, and get new mask and supplies as allowed by insurance and DME. Recommend an earlier appointment, if significant treatment barriers develop.    The risks of untreated sleep apnea were discussed with the patient at length. Patients with sleep apnea are at increased risk of cardiovascular disease including coronary artery disease, systemic arterial hypertension, pulmonary arterial hypertension, cardiac arrythmias, and stroke. The patient was advised to avoid driving a motor vehicle when drowsy.    Positive airway pressure will favorably impact many of the adverse conditions and effects provoked by sleep apnea.           Relevant Orders    DME Other     Have advised the patient to follow up with the appropriate healthcare practitioners for all other medical problems and issues.    Return in about 4 weeks (around 5/1/2023), or if symptoms worsen or fail to improve, for Compliance .    Please note portions of this record was created using voice recognition software. I have made every reasonable attempt to correct obvious errors, but I expect that there are errors of grammar and possibly content I did not discover before finalizing the note.    Time spent in record review prior to patient arrival, reviewing results, and in face-to-face encounter totaled 19 min.  __________  HERB Holguin  Pulmonary & Sleep Medicine  [facility]

## 2023-04-03 ENCOUNTER — OFFICE VISIT (OUTPATIENT)
Dept: SLEEP MEDICINE | Facility: MEDICAL CENTER | Age: 68
End: 2023-04-03
Payer: COMMERCIAL

## 2023-04-03 VITALS
OXYGEN SATURATION: 95 % | HEIGHT: 66 IN | WEIGHT: 203 LBS | DIASTOLIC BLOOD PRESSURE: 88 MMHG | HEART RATE: 70 BPM | BODY MASS INDEX: 32.62 KG/M2 | RESPIRATION RATE: 16 BRPM | SYSTOLIC BLOOD PRESSURE: 132 MMHG

## 2023-04-03 DIAGNOSIS — G47.33 OSA ON CPAP: ICD-10-CM

## 2023-04-03 PROCEDURE — 99212 OFFICE O/P EST SF 10 MIN: CPT

## 2023-04-03 PROCEDURE — 99213 OFFICE O/P EST LOW 20 MIN: CPT

## 2023-04-03 ASSESSMENT — FIBROSIS 4 INDEX: FIB4 SCORE: 1.45

## 2023-04-03 NOTE — ASSESSMENT & PLAN NOTE
Sleep Apnea:    The pathophysiology of sleep anea and the increased risk of cardiovascular morbidity from untreated sleep apnea is discussed in detail with the patient.  Urged to avoid supine sleep, weight gain and alcoholic beverages since all of these can worsen sleep apnea. Cautioned against drowsy driving. If feeling sleepy while driving, pull over for a break/nap, rather than persist on the road, in the interest of own safety and that of others on the road.    Compliance download was reviewed and discussed with the patient.   Patient does have an AHI score of 20.5 with a central index of 13.6.  We will fix the patient's pressure to 9 CWP.  Patient was advised to adhere to compliance requirements.    - Compliance was reinforced  - Recommended the patient against the use of Ozone , such as SoClean  - Clean supplies a couple times a week with dish soap and water and air dry  - Recommended the patient change out supplies as recommended for best mask fit and usage of the machine  - Advised patient to reach out via MyChart if any questions or concerns should arise.   - Equipment replacement schedule:  Mask cushion every month  Nasal pillows 2 times per month  Mask every 6 months  Head gear every 6 months  Tubing every 3 months  Ultra-fine filters 2 times per month  Foam filter every 6 months  Humidifier chamber every 6 months    Has been advised to continue the current CPAP, clean equipment frequently, and get new mask and supplies as allowed by insurance and DME. Recommend an earlier appointment, if significant treatment barriers develop.    The risks of untreated sleep apnea were discussed with the patient at length. Patients with sleep apnea are at increased risk of cardiovascular disease including coronary artery disease, systemic arterial hypertension, pulmonary arterial hypertension, cardiac arrythmias, and stroke. The patient was advised to avoid driving a motor vehicle when drowsy.    Positive airway  pressure will favorably impact many of the adverse conditions and effects provoked by sleep apnea.

## 2023-04-07 DIAGNOSIS — F02.80 DEMENTIA ASSOCIATED WITH OTHER UNDERLYING DISEASE WITHOUT BEHAVIORAL DISTURBANCE (HCC): ICD-10-CM

## 2023-04-07 NOTE — TELEPHONE ENCOUNTER
Received request via: Pharmacy    Was the patient seen in the last year in this department? Yes    Does the patient have an active prescription (recently filled or refills available) for medication(s) requested? Yes.     Does the patient have retirement Plus and need 100 day supply (blood pressure, diabetes and cholesterol meds only)? Patient does not have SCP

## 2023-04-10 RX ORDER — MEMANTINE HYDROCHLORIDE 28 MG/1
28 CAPSULE, EXTENDED RELEASE ORAL
Qty: 90 CAPSULE | Refills: 3 | Status: SHIPPED | OUTPATIENT
Start: 2023-04-10 | End: 2024-03-13

## 2023-04-28 ENCOUNTER — TELEPHONE (OUTPATIENT)
Dept: NEUROLOGY | Facility: MEDICAL CENTER | Age: 68
End: 2023-04-28
Payer: COMMERCIAL

## 2023-04-28 NOTE — TELEPHONE ENCOUNTER

## 2023-05-03 NOTE — PROGRESS NOTES
[facility] Follow-up Visit    Date of Visit: 5/9/2023     CC:  Follow-up for CHIRAG management      HPI:  Eriberto Young is a very pleasant 67 y.o. year old male never smoker, with a PMHx of epilepsy, history of TBI, tremor, migraines, diabetes mellitus type 2, GERD, obesity, and CHIRAG who presented to the [facility] for a regular follow up. Last seen in the office on 4/3/2023 with myself.     Patient presents for compliance after pressure changes.  Patient was previously at a pressure setting of auto CPAP 8-12 CWP and that was switched to a set pressure of 9 CWP due to an increased AHI score of 20.5 with a elevated central index of 13.6.  Patient states that he will have morning headaches, daytime drowsiness, occasional mask leak, and a dry mouth- which is alleviated by water.   Patient denied any morning headaches, driving drowsy, issues falling asleep, snoring, gasping, apneas, palpitations, aerophagia, or skin irritation.  Patient goes to bed between  PM and wakes up between 630-7 AM.  Patient has one awakening a night to use the bathroom and depending on the time, will have issues falling back asleep.  Patient will occasionally nap for 30-60 minutes.        DME provider: Nichole  Device: [employer] AirSense 11  Settings: CPAP 9 CWP  When:  February 2023  Mask: Nasal pillows  Chin strap: No     Cleaning regimen: weekly with dish soap and water    Compliance:  Compliance data reviewed showing 90% usage > 4hours in last 30 days. Average AHI 16.5 events/hour (Central index: 9.0 & Obstructive index: 7.1). 95% leaks 10.2 L/min. Patient continues to use and benefit from machine.      Sleep History:  Per Dr. Dias' note:  He recently underwent a 2 night home sleep study through [place] [facility] in June 2022.  Study on night 1 showed potentially severe obstructive sleep apnea with overall AHI of 30, however only wore the device for just over 2 hours and 18 minutes.  On night 2 he wore the device for  the full night of sleep.  On this night he met criteria for mild obstructive sleep apnea with an overall AHI of 9.  Sleep study not on file.       Patient Active Problem List    Diagnosis Date Noted    CHIRAG on CPAP 04/03/2023    Swelling of limb, left 07/06/2021    Hypomagnesemia 07/05/2021    Trauma 07/02/2021    Hydrocephalus (HCC) 07/02/2021    Epileptic seizure (HCC) 07/02/2021    Dementia (HCC) 07/02/2021    Obesity (BMI 30.0-34.9) 07/02/2021    Adjustment reaction with anxiety and depression 11/07/2017    Dementia associated with other underlying disease without behavioral disturbance (HCC) 10/25/2016    Mosquera's esophagus 04/26/2016    Post concussion syndrome 11/10/2014    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 05/05/2014    Headache, chronic migraine without aura, intractable 05/05/2014    Complex partial epilepsy, intractable (HCC) 12/02/2013    Headache, tension type, chronic 12/02/2013    Tremor due to multiple drugs 12/02/2013     (ventriculoperitoneal) shunt status 12/02/2013    DDD (degenerative disc disease), cervical 12/02/2013     Past Medical History:   Diagnosis Date    Anxiety     Chronic pain     Complex partial epilepsy, intractable ([facility])     Depression     Diabetes ([facility])     Head injuries     Hydrocephalus ([facility])     w/ shunt    Post concussion syndrome 11/10/2014    Self-injurious behavior     Suicide attempt ([facility])       Past Surgical History:   Procedure Laterality Date    ABDOMINAL EXPLORATION      ACHILLES TENDON REPAIR      CRANIOTOMY      HERNIA REP HIATAL      times 2    HERNIA REPAIR      LAMINOTOMY       Family History   Problem Relation Age of Onset    Alcohol abuse Father     ADD / ADHD Maternal Grandfather     ADD / ADHD Maternal Grandmother     Dementia Maternal Grandmother      Social History     Socioeconomic History    Marital status:      Spouse name: Not on file    Number of children: Not on file    Years of education: Not on file    Highest education level: Not on file    Occupational History    Not on file   Tobacco Use    Smoking status: Never    Smokeless tobacco: Never   Vaping Use    Vaping Use: Never used   Substance and Sexual Activity    Alcohol use: Yes     Alcohol/week: 1.2 oz     Types: 2 Glasses of wine per week    Drug use: No    Sexual activity: Not on file   Other Topics Concern    Not on file   Social History Narrative    Not on file     Social Determinants of Health     Financial Resource Strain: Not on file   Food Insecurity: Not on file   Transportation Needs: Not on file   Physical Activity: Not on file   Stress: Not on file   Social Connections: Not on file   Intimate Partner Violence: Not on file   Housing Stability: Not on file     Current Outpatient Medications   Medication Sig Dispense Refill    divalproex (DEPAKOTE) 250 MG Tablet Delayed Response divalproex 250 mg tablet,delayed release   TAKE 1 TABLET BY MOUTH 2 TIMES A DAY      GEMTESA 75 MG Tab Take 1 Tablet by mouth every day.      topiramate (TOPAMAX) 25 MG capsule Take 1 Capsule by mouth 2 times a day for 90 days. 180 Capsule 3    Memantine HCl ER (NAMENDA) 28 MG CAPSULE SR 24 HR Take 1 Capsule by mouth every day. 90 Capsule 3    propranolol LA (INDERAL LA) 60 MG CAPSULE SR 24 HR TAKE 1 CAPSULE BY MOUTH EVERY DAY 90 Capsule 3    atorvastatin (LIPITOR) 10 MG Tab Take 1 Tablet by mouth every evening.      metformin (GLUCOPHAGE) 1000 MG tablet Take 1 Tablet by mouth 2 times a day with meals.      Zoster Vac Recomb Adjuvanted (SHINGRIX) 50 MCG/0.5ML Recon Susp       Influenza Vac Subunit Quad (FLUCELVAX QUADRIVALENT) 0.5 ML Suspension Prefilled Syringe injection       loperamide (IMODIUM) 2 MG Cap Take 1 capsule by mouth 4 times a day as needed for Diarrhea. 10 capsule 0    DULoxetine (CYMBALTA) 60 MG Cap DR Particles delayed-release capsule Take 1 Capsule by mouth every evening.      therapeutic multivitamin-minerals (THERAGRAN-M) Tab Take 1 Tablet by mouth every day.      omega-3 acid ethyl esters  "(LOVAZA) 1 GM capsule Take 1 Capsule by mouth 2 times a day.      traZODone (DESYREL) 50 MG Tab Take 1 Tablet by mouth at bedtime as needed for Sleep.      ketorolac (TORADOL) 10 MG Tab Take 1 Tab by mouth every four hours as needed. 10 Tab 5    EMGALITY 120 MG/ML Solution Auto-injector 120 mg by Injection route.      Ibuprofen 200 MG Cap None Entered      BOTOX 200 UNITS Recon Soln       pantoprazole (PROTONIX) 40 MG Tablet Delayed Response TAKE 1 TABLET BY MOUTH DAILY, 30 MIN BEFORE BREAKFAST  1    hydrocodone-acetaminophen (NORCO) 5-325 MG TABS per tablet Take 1-2 Tabs by mouth every four hours as needed. 30 Tab 5    M-VIT PO        No current facility-administered medications for this visit.      ALLERGIES: Ativan, Hydromorphone, Ativan, Hydromorphone hcl, and Nkda [no known drug allergy]    ROS:  Constitutional: Denies fever, chills, sweats,  weight loss, fatigue  Cardiovascular: Denies chest pain, tightness, palpitations, swelling in legs/feet  Respiratory: Denies shortness of breath, cough, sputum, wheezing, painful breathing   Sleep: per HPI  Gastrointestinal: Denies  difficulty swallowing, nausea, abdominal pain, diarrhea, constipation, heartburn.  Musculoskeletal: Denies painful joints, sore muscles,       PHYSICAL EXAM:  /76 (BP Location: Right arm, Patient Position: Sitting, BP Cuff Size: Adult)   Pulse 70   Resp 16   Ht 1.664 m (5' 5.5\")   Wt 87.5 kg (193 lb)   SpO2 95% Comment: room air at rest  BMI 31.63 kg/m²   Appearance: Well-nourished, well-developed, no acute distress  Eyes:  No scleral icterus , EOMI  ENMT: No redness of the oropharynx  Lung auscultation:  No wheezes rhonchi rubs or rales  Cardiac: No murmurs, rubs, or gallops; regular rhythm, normal rate; no edema  Musculoskeletal:  Grossly normal; gait and station normal; digits and nails normal  Skin:  No rashes, petechiae, cyanosis  Neurologic: without focal signs; oriented to person, time, place, and purpose; judgement " intact  Psychiatric:  No depression, anxiety, agitation  Mallampati score: Class III    Assessment and Plan:    The medical record was reviewed.    Diagnostic and titration nocturnal polysomnogram's, home sleep apnea tests, continuous nocturnal oximetry results, multiple sleep latency tests, and compliance reports reviewed.    Problem List Items Addressed This Visit       CHIRAG on CPAP     Sleep Apnea:    The pathophysiology of sleep anea and the increased risk of cardiovascular morbidity from untreated sleep apnea is discussed in detail with the patient.  Urged to avoid supine sleep, weight gain and alcoholic beverages since all of these can worsen sleep apnea. Cautioned against drowsy driving. If feeling sleepy while driving, pull over for a break/nap, rather than persist on the road, in the interest of own safety and that of others on the road.    Compliance download was reviewed and discussed with the patient.  Since the patient still has an elevated AHI with elevated central index, despite fixing the pressure.  We will send him for a CPAP/BiPAP titration and see him back in 4 weeks to review results.  Patient was instructed to continue PAP therapy in the meantime.     - Order placed for CPAP/BiPAP titraion  - Compliance was reinforced  - Recommended the patient against the use of Ozone , such as SoClean  - Clean supplies a couple times a week with dish soap and water and air dry  - Recommended the patient change out supplies as recommended for best mask fit and usage of the machine  - Advised patient to reach out via [email or web address] if any questions or concerns should arise.   - Equipment replacement schedule:  Mask cushion every month  Nasal pillows 2 times per month  Mask every 6 months  Head gear every 6 months  Tubing every 3 months  Ultra-fine filters 2 times per month  Foam filter every 6 months  Humidifier chamber every 6 months    Has been advised to continue the current CPAP, clean equipment frequently,  and get new mask and supplies as allowed by insurance and DME. Recommend an earlier appointment, if significant treatment barriers develop.    The risks of untreated sleep apnea were discussed with the patient at length. Patients with sleep apnea are at increased risk of cardiovascular disease including coronary artery disease, systemic arterial hypertension, pulmonary arterial hypertension, cardiac arrythmias, and stroke. The patient was advised to avoid driving a motor vehicle when drowsy.    Positive airway pressure will favorably impact many of the adverse conditions and effects provoked by sleep apnea.           Relevant Orders    Polysomnography Titration     Have advised the patient to follow up with the appropriate healthcare practitioners for all other medical problems and issues.    Return in about 4 weeks (around 6/6/2023), or if symptoms worsen or fail to improve, for PSG titration .    Please note portions of this record was created using voice recognition software. I have made every reasonable attempt to correct obvious errors, but I expect that there are errors of grammar and possibly content I did not discover before finalizing the note.    Time spent in record review prior to patient arrival, reviewing results, and in face-to-face encounter totaled 22 min.  __________  HERB Holguin  Pulmonary & Sleep Medicine  [facility]

## 2023-05-08 ENCOUNTER — OFFICE VISIT (OUTPATIENT)
Dept: NEUROLOGY | Facility: MEDICAL CENTER | Age: 68
End: 2023-05-08
Attending: PSYCHIATRY & NEUROLOGY
Payer: COMMERCIAL

## 2023-05-08 VITALS
WEIGHT: 211.2 LBS | OXYGEN SATURATION: 93 % | HEIGHT: 66 IN | BODY MASS INDEX: 33.94 KG/M2 | TEMPERATURE: 97.9 F | RESPIRATION RATE: 16 BRPM | SYSTOLIC BLOOD PRESSURE: 122 MMHG | DIASTOLIC BLOOD PRESSURE: 70 MMHG | HEART RATE: 77 BPM

## 2023-05-08 DIAGNOSIS — G25.1 TREMOR DUE TO MULTIPLE DRUGS: ICD-10-CM

## 2023-05-08 DIAGNOSIS — G40.509 NONINTRACTABLE EPILEPSY DUE TO EXTERNAL CAUSES, WITHOUT STATUS EPILEPTICUS (HCC): ICD-10-CM

## 2023-05-08 DIAGNOSIS — G43.719 INTRACTABLE CHRONIC MIGRAINE WITHOUT AURA AND WITHOUT STATUS MIGRAINOSUS: ICD-10-CM

## 2023-05-08 PROCEDURE — 99215 OFFICE O/P EST HI 40 MIN: CPT | Performed by: PSYCHIATRY & NEUROLOGY

## 2023-05-08 PROCEDURE — 99212 OFFICE O/P EST SF 10 MIN: CPT | Performed by: PSYCHIATRY & NEUROLOGY

## 2023-05-08 RX ORDER — VIBEGRON 75 MG/1
1 TABLET, FILM COATED ORAL
COMMUNITY
Start: 2023-04-05

## 2023-05-08 RX ORDER — VIBEGRON 75 MG/1
TABLET, FILM COATED ORAL
COMMUNITY
End: 2023-05-08

## 2023-05-08 RX ORDER — DIVALPROEX SODIUM 250 MG/1
TABLET, DELAYED RELEASE ORAL
COMMUNITY

## 2023-05-08 RX ORDER — TOPIRAMATE SPINKLE 25 MG/1
25 CAPSULE ORAL 2 TIMES DAILY
Qty: 180 CAPSULE | Refills: 3 | Status: SHIPPED | OUTPATIENT
Start: 2023-05-08 | End: 2023-08-06

## 2023-05-08 ASSESSMENT — FIBROSIS 4 INDEX: FIB4 SCORE: 1.45

## 2023-05-08 ASSESSMENT — PATIENT HEALTH QUESTIONNAIRE - PHQ9: CLINICAL INTERPRETATION OF PHQ2 SCORE: 2

## 2023-05-08 NOTE — ASSESSMENT & PLAN NOTE
Pt is on 500mg VPA in the am which is a decrease from his previous and he has trouble when he eats soup.

## 2023-05-08 NOTE — ASSESSMENT & PLAN NOTE
Pt gets the botox and that helps with his pain specialist. Pt states that he has had increase headaches and he is not sure which if it has made a difference.

## 2023-05-09 ENCOUNTER — OFFICE VISIT (OUTPATIENT)
Dept: SLEEP MEDICINE | Facility: MEDICAL CENTER | Age: 68
End: 2023-05-09
Payer: COMMERCIAL

## 2023-05-09 VITALS
WEIGHT: 193 LBS | BODY MASS INDEX: 31.02 KG/M2 | RESPIRATION RATE: 16 BRPM | OXYGEN SATURATION: 95 % | HEART RATE: 70 BPM | SYSTOLIC BLOOD PRESSURE: 120 MMHG | HEIGHT: 66 IN | DIASTOLIC BLOOD PRESSURE: 76 MMHG

## 2023-05-09 DIAGNOSIS — G47.33 OSA ON CPAP: ICD-10-CM

## 2023-05-09 PROCEDURE — 99213 OFFICE O/P EST LOW 20 MIN: CPT

## 2023-05-09 ASSESSMENT — FIBROSIS 4 INDEX: FIB4 SCORE: 1.45

## 2023-05-09 NOTE — ASSESSMENT & PLAN NOTE
Sleep Apnea:    The pathophysiology of sleep anea and the increased risk of cardiovascular morbidity from untreated sleep apnea is discussed in detail with the patient.  Urged to avoid supine sleep, weight gain and alcoholic beverages since all of these can worsen sleep apnea. Cautioned against drowsy driving. If feeling sleepy while driving, pull over for a break/nap, rather than persist on the road, in the interest of own safety and that of others on the road.    Compliance download was reviewed and discussed with the patient.  Since the patient still has an elevated AHI with elevated central index, despite fixing the pressure.  We will send him for a CPAP/BiPAP titration and see him back in 4 weeks to review results.  Patient was instructed to continue PAP therapy in the meantime.     - Order placed for CPAP/BiPAP titraion  - Compliance was reinforced  - Recommended the patient against the use of Ozone , such as SoClean  - Clean supplies a couple times a week with dish soap and water and air dry  - Recommended the patient change out supplies as recommended for best mask fit and usage of the machine  - Advised patient to reach out via [employer] if any questions or concerns should arise.   - Equipment replacement schedule:  Mask cushion every month  Nasal pillows 2 times per month  Mask every 6 months  Head gear every 6 months  Tubing every 3 months  Ultra-fine filters 2 times per month  Foam filter every 6 months  Humidifier chamber every 6 months    Has been advised to continue the current CPAP, clean equipment frequently, and get new mask and supplies as allowed by insurance and DME. Recommend an earlier appointment, if significant treatment barriers develop.    The risks of untreated sleep apnea were discussed with the patient at length. Patients with sleep apnea are at increased risk of cardiovascular disease including coronary artery disease, systemic arterial hypertension, pulmonary arterial  hypertension, cardiac arrythmias, and stroke. The patient was advised to avoid driving a motor vehicle when drowsy.    Positive airway pressure will favorably impact many of the adverse conditions and effects provoked by sleep apnea.

## 2023-05-09 NOTE — PROGRESS NOTES
Chief Complaint   Patient presents with    Follow-Up     headache       Problem List Items Addressed This Visit       Tremor due to multiple drugs     Pt is on 500mg VPA in the am which is a decrease from his previous and he has trouble when he eats soup.         Headache, chronic migraine without aura, intractable     Pt gets the botox and that helps with his pain specialist. Pt states that he has had increase headaches and he is not sure which if it has made a difference.         Relevant Medications    divalproex (DEPAKOTE) 250 MG Tablet Delayed Response    topiramate (TOPAMAX) 25 MG capsule    Epileptic seizure (HCC)     Any breakthrough pseudoseizures or epileptic seizures since the last visit.         Relevant Medications    divalproex (DEPAKOTE) 250 MG Tablet Delayed Response    topiramate (TOPAMAX) 25 MG capsule       History of present illness:  Eriberto Young 67 y.o. male presents today for headaches epilepsy and tremor.    Past medical history:   Past Medical History:   Diagnosis Date    Anxiety     Chronic pain     Complex partial epilepsy, intractable (HCC)     Depression     Diabetes (HCC)     Head injuries     Hydrocephalus (HCC)     w/ shunt    Post concussion syndrome 11/10/2014    Self-injurious behavior     Suicide attempt ([facility])        Past surgical history:   Past Surgical History:   Procedure Laterality Date    ABDOMINAL EXPLORATION      ACHILLES TENDON REPAIR      CRANIOTOMY      HERNIA REP HIATAL      times 2    HERNIA REPAIR      LAMINOTOMY         Family history:   Family History   Problem Relation Age of Onset    Alcohol abuse Father     ADD / ADHD Maternal Grandfather     ADD / ADHD Maternal Grandmother     Dementia Maternal Grandmother        Social history:   Social History     Socioeconomic History    Marital status:      Spouse name: Not on file    Number of children: Not on file    Years of education: Not on file    Highest education level: Not on file   Occupational History     Not on file   Tobacco Use    Smoking status: Never    Smokeless tobacco: Never   Vaping Use    Vaping Use: Never used   Substance and Sexual Activity    Alcohol use: Yes     Alcohol/week: 1.2 oz     Types: 2 Glasses of wine per week    Drug use: No    Sexual activity: Not on file   Other Topics Concern    Not on file   Social History Narrative    Not on file     Social Determinants of Health     Financial Resource Strain: Not on file   Food Insecurity: Not on file   Transportation Needs: Not on file   Physical Activity: Not on file   Stress: Not on file   Social Connections: Not on file   Intimate Partner Violence: Not on file   Housing Stability: Not on file       Current medications:   Current Outpatient Medications   Medication    divalproex (DEPAKOTE) 250 MG Tablet Delayed Response    GEMTESA 75 MG Tab    topiramate (TOPAMAX) 25 MG capsule    Memantine HCl ER (NAMENDA) 28 MG CAPSULE SR 24 HR    propranolol LA (INDERAL LA) 60 MG CAPSULE SR 24 HR    atorvastatin (LIPITOR) 10 MG Tab    metformin (GLUCOPHAGE) 1000 MG tablet    Zoster Vac Recomb Adjuvanted (SHINGRIX) 50 MCG/0.5ML Recon Susp    Influenza Vac Subunit Quad (FLUCELVAX QUADRIVALENT) 0.5 ML Suspension Prefilled Syringe injection    loperamide (IMODIUM) 2 MG Cap    OnabotulinumtoxinA (BOTOX INJ)    DULoxetine (CYMBALTA) 60 MG Cap DR Particles delayed-release capsule    therapeutic multivitamin-minerals (THERAGRAN-M) Tab    omega-3 acid ethyl esters (LOVAZA) 1 GM capsule    traZODone (DESYREL) 50 MG Tab    ketorolac (TORADOL) 10 MG Tab    EMGALITY 120 MG/ML Solution Auto-injector    Ibuprofen 200 MG Cap    BOTOX 200 UNITS Recon Soln    pantoprazole (PROTONIX) 40 MG Tablet Delayed Response    hydrocodone-acetaminophen (NORCO) 5-325 MG TABS per tablet    M-VIT PO     No current facility-administered medications for this visit.       Medication Allergy:  Allergies   Allergen Reactions    Ativan Unspecified     Okay with small amount    Hydromorphone Unspecified  "    Okay with small amount    Ativan      High doses    Hydromorphone Hcl     Nkda [No Known Drug Allergy]        Review of systems:   Constitutional: denies fever, night sweats, weight loss.   Eyes: denies acute vision change, eye pain or secretion.   Ears, Nose, Mouth, Throat: denies nasal secretion, nasal bleeding, difficulty swallowing, hearing loss, tinnitus, vertigo, ear pain, acute dental problems, oral ulcers or lesions.   Endocrine: denies recent weight changes, heat or cold intolerance, polyuria, polydypsia, polyphagia,abnormal hair growth.  Cardiovascular: denies new onset of chest pain, palpitations, syncope, or dyspnea of exertion.  Pulmonary: denies shortness of breath, new onset of cough, hemoptysis, wheezing, chest pain or flu-like symptoms.   GI: denies nausea, vomiting, diarrhea, GI bleeding, change in appetite, abdominal pain, and change in bowel habits.  : denies dysuria, urinary incontinence, hematuria.  Heme/oncology: denies history of easy bruising or bleeding. No history of cancer, DVTor PE.  Allergy/immunology: denies hives/urticaria, or itching.   Dermatologic: denies new rash, or new skin lesions.  Musculoskeletal:denies joint swelling or pain, muscle pain, neck and back pain. Neurologic: denies headaches, acute visual changes, facial droopiness, muscle weakness (focal or generalized), paresthesias, anesthesia, ataxia, change in speech or language, memory loss, abnormal movements, seizures, loss of consciousness, or episodes of confusion.   Psychiatric: denies symptoms of depression, anxiety, hallucinations, mood swings or changes, suicidal or homicidal thoughts.     Physical examination:   Vitals:    05/08/23 1306   BP: 122/70   BP Location: Right arm   Patient Position: Sitting   BP Cuff Size: Adult   Pulse: 77   Resp: 16   Temp: 36.6 °C (97.9 °F)   TempSrc: Temporal   SpO2: 93%   Weight: 95.8 kg (211 lb 3.2 oz)   Height: 1.676 m (5' 6\")     General: Patient in no acute distress, " pleasant and cooperative.  HEENT: Normocephalic, no signs of acute trauma.   Neck: supple, no meningeal signs or carotid bruits. There is normal range of motion. No tenderness on exam.   Chest: clear to auscultation. No cough.   CV: RRR, no murmurs.   Skin: no signs of acute rashes or trauma.   Musculoskeletal: joints exhibit full range of motion, without any pain to palpation. There are no signs of joint or muscle swelling. There is no tenderness to deep palpation of muscles.   Psychiatric: No hallucinatory behavior. Denies symptoms of depression or suicidal ideation. Mood and affect appear normal on exam.     NEUROLOGICAL EXAM:   Mental status, orientation: Awake, alert and fully oriented.   Speech and language: speech is clear and fluent. The patient is able to name, repeat and comprehend.   Memory: There is intact recollection of recent and remote events.   Cranial nerve exam: Pupils are 3-4 mm bilaterally and equally reactive to light and accommodation. Visual fields are intact by confrontation. Fundoscopic exam was unremarkable. There is no nystagmus on primary or secondary gaze. Intact full EOM in all directions of gaze. Face appears symmetric. Sensation in the face is intact to light touch. Uvula is midline. Palate elevates symmetrically. Tongue is midline and without any signs of tongue biting or fasciculations. Sternocleidomastoid muscles exhibit is normal strength bilaterally. Shoulder shrug is intact bilaterally.   Motor exam: Strength is 5/5 in all extremities. Tone is normal.  Patient tremor which is fatigable.  Sensory exam reveals normal sense of light touch, proprioception, vibration and pinprick in all extremities.   Deep tendon reflexes:  2+ throughout. Plantar responses are flexor. There is no clonus.   Coordination: shows a normal finger-nose-finger. Normal rapidly alternating movements.   Gait: The patient was able to get up from seated position on first attempt without requiring assistance.  Found to be steady when walking. Movements were fluid with normal arm swing. The patient was able to turn without difficulties or tendency to fall. Romberg examination positive      ANCILLARY DATA REVIEWED:     Lab Data Review:  No results found for this or any previous visit (from the past 24 hour(s)).    Records reviewed: Previous records since last visit.      Imaging: I reviewed last neuroimaging.          ASSESSMENT AND PLAN:    1. Tremor due to multiple drugs  Plan to try increasing to topiramate 25 mg twice daily and continuing the propranolol.  Patient is on a lower dose of valproic acid and we will see how he does with his headaches versus his tremor over the next 6 months.    2. Intractable chronic migraine without aura and without status migrainosus  Because headaches have gotten worse we will increase the topiramate to 25 mg twice daily.  Also continue with Emgality and will take Nurtec as needed.  - topiramate (TOPAMAX) 25 MG capsule; Take 1 Capsule by mouth 2 times a day for 90 days.  Dispense: 180 Capsule; Refill: 3    3. Nonintractable epilepsy due to external causes, without status epilepticus (HCC)  Plan to continue with seizure precautions.        FOLLOW-UP:   Return in about 6 months (around 11/8/2023).      My total time spent caring for the patient on the day of the encounter was 42 minutes.   This does not include time spent on separately billable procedures/tests.     EDUCATION AND COUNSELING:  -Discussed regular exercise program and prevention of cardiovascular disease, including stroke.   -Discussed healthy lifestyle, including: healthy diet (rich in fruits, vegetables, nuts and healthy oils); proper hydration, and adequate sleep hygiene (allowing 7-8 hrs of overnight sleep).        Melissa Bloch, MD  Clinical  of Neurology [facility] [employer].   Diplomate in Neurology.   Office: [phone]  Fax: [phone]

## 2023-05-17 DIAGNOSIS — G43.719 INTRACTABLE CHRONIC MIGRAINE WITHOUT AURA AND WITHOUT STATUS MIGRAINOSUS: ICD-10-CM

## 2023-05-17 RX ORDER — KETOROLAC TROMETHAMINE 10 MG/1
10 TABLET, FILM COATED ORAL EVERY 4 HOURS PRN
Qty: 10 TABLET | Refills: 5 | Status: SHIPPED | OUTPATIENT
Start: 2023-05-17 | End: 2024-02-16

## 2023-05-17 NOTE — TELEPHONE ENCOUNTER
Eriberto Young Neurology [facility]  Phone Number: [phone]     Hi there- We wanted to get a refill on this and it's been awhile since Rico has gotten a refill, so it's off the list.  Can we get a new prescription?  Thanks!   Sent via [employer]

## 2023-06-19 ENCOUNTER — SLEEP STUDY (OUTPATIENT)
Dept: SLEEP MEDICINE | Facility: MEDICAL CENTER | Age: 68
End: 2023-06-19
Payer: COMMERCIAL

## 2023-06-19 DIAGNOSIS — G47.33 OSA ON CPAP: ICD-10-CM

## 2023-06-19 PROCEDURE — 95811 POLYSOM 6/>YRS CPAP 4/> PARM: CPT | Performed by: STUDENT IN AN ORGANIZED HEALTH CARE EDUCATION/TRAINING PROGRAM

## 2023-06-19 NOTE — PROGRESS NOTES
[facility] Follow-up Visit    Date of Visit: 7/7/2023     CC:  Follow-up for CHIRAG management      HPI:  Eriberto Young is a very pleasant 67 y.o. year old male never smoker, with a PMHx of epilepsy, history of TBI, tremor, migraines, diabetes mellitus type 2, GERD, obesity, and CHIRAG who presented to the [facility] for a regular follow up. Last seen in the office on 5/9/2023 with myself.     Patient presents for titration results.  Patient states he has not been using his CPAP machine.  He does states that he has daytime drowsiness and a dry mouth.  He denies any significant morning headaches, drowsiness while driving, issues falling asleep, snoring, gasping, apneas, palpitations.  Patient goes to bed between 10-10:30 PM and wakes up between 7-7:30 AM.  He will have 1 awakening at night to use bathroom but will not have any issues falling back asleep.  He will nap 3-4 times a week for 1-1.5 hours at a time.    DME provider: Nichole  Device: "[employer] AirSense 11  Settings: CPAP 9 CWP  When:  February 2023  Mask: Nasal pillows  Chin strap: No     Cleaning regimen: weekly with dish soap and water    Compliance:  Compliance data reviewed showing 17% usage > 4hours in last 30 days. Average AHI 73. events/hour.  95% leaks 11.1 L/min. Patient continues to use and benefit from machine.     Sleep History:  Per Dr. Dias' note:  He recently underwent a 2 night home sleep study through [facility] in June 2022.  Study on night 1 showed potentially severe obstructive sleep apnea with overall AHI of 30, however only wore the device for just over 2 hours and 18 minutes.  On night 2 he wore the device for the full night of sleep.  On this night he met criteria for mild obstructive sleep apnea with an overall AHI of 9.  Sleep study not on file.     PSG titration 6/19/2023-      Patient Active Problem List    Diagnosis Date Noted    Complex sleep apnea syndrome 04/03/2023    Swelling of limb, left 07/06/2021     Hypomagnesemia 07/05/2021    Trauma 07/02/2021    Hydrocephalus (HCC) 07/02/2021    Epileptic seizure (HCC) 07/02/2021    Dementia ([facility]) 07/02/2021    Obesity (BMI 30.0-34.9) 07/02/2021    Adjustment reaction with anxiety and depression 11/07/2017    Dementia associated with other underlying disease without behavioral disturbance (HCC) 10/25/2016    Mosquera's esophagus 04/26/2016    Post concussion syndrome 11/10/2014    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 05/05/2014    Headache, chronic migraine without aura, intractable 05/05/2014    Complex partial epilepsy, intractable (HCC) 12/02/2013    Headache, tension type, chronic 12/02/2013    Tremor due to multiple drugs 12/02/2013     (ventriculoperitoneal) shunt status 12/02/2013    DDD (degenerative disc disease), cervical 12/02/2013     Past Medical History:   Diagnosis Date    Anxiety     Chronic pain     Complex partial epilepsy, intractable ([facility])     Depression     Diabetes ([facility])     Head injuries     Hydrocephalus ([facility])     w/ shunt    Post concussion syndrome 11/10/2014    Self-injurious behavior     Suicide attempt ([facility])       Past Surgical History:   Procedure Laterality Date    ABDOMINAL EXPLORATION      ACHILLES TENDON REPAIR      CRANIOTOMY      HERNIA REP HIATAL      times 2    HERNIA REPAIR      LAMINOTOMY       Family History   Problem Relation Age of Onset    Alcohol abuse Father     ADD / ADHD Maternal Grandfather     ADD / ADHD Maternal Grandmother     Dementia Maternal Grandmother      Social History     Socioeconomic History    Marital status:      Spouse name: Not on file    Number of children: Not on file    Years of education: Not on file    Highest education level: Not on file   Occupational History    Not on file   Tobacco Use    Smoking status: Never    Smokeless tobacco: Never   Vaping Use    Vaping Use: Never used   Substance and Sexual Activity    Alcohol use: Yes     Alcohol/week: 1.2 oz     Types: 2 Glasses of wine per week    Drug  use: No    Sexual activity: Not on file   Other Topics Concern    Not on file   Social History Narrative    Not on file     Social Determinants of Health     Financial Resource Strain: Not on file   Food Insecurity: Not on file   Transportation Needs: Not on file   Physical Activity: Not on file   Stress: Not on file   Social Connections: Not on file   Intimate Partner Violence: Not on file   Housing Stability: Not on file     Current Outpatient Medications   Medication Sig Dispense Refill    ketorolac (TORADOL) 10 MG Tab Take 1 Tablet by mouth every four hours as needed for Moderate Pain. 10 Tablet 5    GEMTESA 75 MG Tab Take 1 Tablet by mouth every day.      topiramate (TOPAMAX) 25 MG capsule Take 1 Capsule by mouth 2 times a day for 90 days. 180 Capsule 3    Memantine HCl ER (NAMENDA) 28 MG CAPSULE SR 24 HR Take 1 Capsule by mouth every day. 90 Capsule 3    propranolol LA (INDERAL LA) 60 MG CAPSULE SR 24 HR TAKE 1 CAPSULE BY MOUTH EVERY DAY 90 Capsule 3    atorvastatin (LIPITOR) 10 MG Tab Take 1 Tablet by mouth every evening.      metformin (GLUCOPHAGE) 1000 MG tablet Take 1 Tablet by mouth 2 times a day with meals.      Zoster Vac Recomb Adjuvanted (SHINGRIX) 50 MCG/0.5ML Recon Susp       Influenza Vac Subunit Quad (FLUCELVAX QUADRIVALENT) 0.5 ML Suspension Prefilled Syringe injection       DULoxetine (CYMBALTA) 60 MG Cap DR Particles delayed-release capsule Take 1 Capsule by mouth every evening.      therapeutic multivitamin-minerals (THERAGRAN-M) Tab Take 1 Tablet by mouth every day.      omega-3 acid ethyl esters (LOVAZA) 1 GM capsule Take 1 Capsule by mouth 2 times a day.      traZODone (DESYREL) 50 MG Tab Take 1 Tablet by mouth at bedtime as needed for Sleep.      EMGALITY 120 MG/ML Solution Auto-injector 120 mg by Injection route.      Ibuprofen 200 MG Cap None Entered      BOTOX 200 UNITS Recon Soln       pantoprazole (PROTONIX) 40 MG Tablet Delayed Response TAKE 1 TABLET BY MOUTH DAILY, 30 MIN BEFORE  "BREAKFAST  1    hydrocodone-acetaminophen (NORCO) 5-325 MG TABS per tablet Take 1-2 Tabs by mouth every four hours as needed. 30 Tab 5    M-VIT PO       divalproex (DEPAKOTE) 250 MG Tablet Delayed Response divalproex 250 mg tablet,delayed release   TAKE 1 TABLET BY MOUTH 2 TIMES A DAY      loperamide (IMODIUM) 2 MG Cap Take 1 capsule by mouth 4 times a day as needed for Diarrhea. 10 capsule 0     No current facility-administered medications for this visit.      ALLERGIES: Ativan, Hydromorphone, Ativan, Hydromorphone hcl, and Nkda [no known drug allergy]    ROS:  Constitutional: Denies fever, chills, sweats,  weight loss, fatigue  Cardiovascular: Denies chest pain, tightness, palpitations, swelling in legs/feet  Respiratory: Denies shortness of breath, cough, sputum, wheezing, painful breathing   Sleep: per HPI  Gastrointestinal: Denies  difficulty swallowing, nausea, abdominal pain, diarrhea, constipation, heartburn.  Musculoskeletal: Denies painful joints, sore muscles,       PHYSICAL EXAM:  /74 (BP Location: Right arm, Patient Position: Sitting, BP Cuff Size: Adult)   Pulse 68   Resp 16   Ht 1.676 m (5' 6\")   Wt 88 kg (194 lb)   SpO2 95% Comment: room air at rest  BMI 31.31 kg/m²   Appearance: Well-nourished, well-developed, no acute distress  Eyes:  No scleral icterus , EOMI  ENMT: No redness of the oropharynx  Lung auscultation:  No wheezes rhonchi rubs or rales  Cardiac: No murmurs, rubs, or gallops; regular rhythm, normal rate; no edema  Musculoskeletal:  Grossly normal; gait and station normal; digits and nails normal  Skin:  No rashes, petechiae, cyanosis  Neurologic: without focal signs; oriented to person, time, place, and purpose; judgement intact  Psychiatric:  No depression, anxiety, agitation  Mallampati score: Class III    Assessment and Plan:    The medical record was reviewed.    Diagnostic and titration nocturnal polysomnogram's, home sleep apnea tests, continuous nocturnal oximetry " results, multiple sleep latency tests, and compliance reports reviewed.    Problem List Items Addressed This Visit       Complex sleep apnea syndrome     Sleep Apnea:    The pathophysiology of sleep anea and the increased risk of cardiovascular morbidity from untreated sleep apnea is discussed in detail with the patient.  Urged to avoid supine sleep, weight gain and alcoholic beverages since all of these can worsen sleep apnea. Cautioned against drowsy driving. If feeling sleepy while driving, pull over for a break/nap, rather than persist on the road, in the interest of own safety and that of others on the road.    Titration study was reviewed and discussed with the patient.     - Order placed for BiPAP ST / ASV titration   - Compliance was reinforced  - Recommended the patient against the use of Ozone , such as SoClean  - Clean supplies a couple times a week with dish soap and water and air dry  - Recommended the patient change out supplies as recommended for best mask fit and usage of the machine  - Advised patient to reach out via MyChart if any questions or concerns should arise.   - Equipment replacement schedule:  Mask cushion every month  Nasal pillows 2 times per month  Mask every 6 months  Head gear every 6 months  Tubing every 3 months  Ultra-fine filters 2 times per month  Foam filter every 6 months  Humidifier chamber every 6 months  Chin strap every 6 months    Has been advised to continue the current CPAP, clean equipment frequently, and get new mask and supplies as allowed by insurance and DME. Recommend an earlier appointment, if significant treatment barriers develop.    The risks of untreated sleep apnea were discussed with the patient at length. Patients with sleep apnea are at increased risk of cardiovascular disease including coronary artery disease, systemic arterial hypertension, pulmonary arterial hypertension, cardiac arrythmias, and stroke. The patient was advised to avoid driving  a motor vehicle when drowsy.    Positive airway pressure will favorably impact many of the adverse conditions and effects provoked by sleep apnea.          Have advised the patient to follow up with the appropriate healthcare practitioners for all other medical problems and issues.    Return in about 4 weeks (around 8/4/2023), or if symptoms worsen or fail to improve, for PSG/SS results, with Johanne.    Please note portions of this record was created using voice recognition software. I have made every reasonable attempt to correct obvious errors, but I expect that there are errors of grammar and possibly content I did not discover before finalizing the note.    Time spent in record review prior to patient arrival, reviewing results, and in face-to-face encounter totaled 20 min.  __________  HERB Holguin  Pulmonary & Sleep Medicine  [facility]

## 2023-06-20 NOTE — PROCEDURES
Patient: SHAWN SOTO  ID: [id number] Date: 6/19/2023 Exam No.:   MONTAGE: Standard  STUDY TYPE: Treatment  RECORDING TECHNIQUE:   After the scalp was prepared, gold plated electrodes were applied to the scalp according to the International 10-20 System. EEG (electroencephalogram) was continuously monitored from the O1-M2, O2-M1, C3-M2, C4-M1, F3-M2, and F4-M1. EOGs (electrooculograms) were monitored by electrodes placed at the left and right outer canthi. Chin EMG (electromyogram) was monitored by electrodes placed on the mentalis and sub-mentalis muscles. Nasal and oral airflow were monitored using a triple port thermocouple as well as oronasal pressure transducer. Respiratory effort was measured by inductive plethysmography technology employing abdominal and thoracic belts. Blood oxygen saturation and pulse were monitored by pulse oximetry. Heart rhythm was monitored by surface electrocardiogram. Leg EMG was studied using surface electrodes placed on left and right anterior tibialis. A microphone was used to monitor tracheal sounds and snoring. Body position was monitored and documented by technician observation.   SCORING CRITERIA:   A modification of the AASM manual for scoring of sleep and associated events was used. Obstructive apneas were scored by cessation of airflow for at least 10 seconds with continuing respiratory effort. Central apneas were scored by cessation of airflow for at least 10 seconds with no respiratory effort. Hypopneas were scored by a 30% or more reduction in airflow for at least 10 seconds accompanied by arterial oxygen desaturation of 3% or an arousal. For CMS (Medicare) patients, per AASM rule 1B, hypopneas are scored by 30% with mild reduction in airflow for at least 10 seconds accompanied by arterial saturation decreased at 4%.  Study start time was 10:08:31 PM. Diagnostic recording time was 6h 53.5m with a total sleep time of 5h 31.5m resulting in a sleep efficiency of 80.17%%.  Sleep latency from the start of the study was 06 minutes and the latency from sleep to REM was 184 minutes. In total,33 arousals were scored for an arousal index of 6.0.  Respiratory:  There were a total of 119 apneas consisting of 0 obstructive apneas, 0 mixed apneas, and 119 central apneas. A total of 68 hypopneas were scored. The apnea index was 21.54 per hour and the hypopnea index was 12.31 per hour resulting in an overall AHI of 33.85. AHI during REM was 9.4 and AHI while supine was 0.00.  Central apneas accounted for 63.6% of respiratory events  Oximetry:  There was a mean oxygen saturation of 91.0%. The minimum oxygen saturation in NREM was 82.0 % and in REM was 89.0%. The patient spent 23.7 minutes of TST with SaO2 <88%.  Cardiac:  The highest heart rate seen while awake was 85 BPM while the highest heart rate during sleep was 79 BPM with an average sleeping heart rate of 62 BPM.  Limb Movements:  There were a total of 15 PLMs during sleep which resulted in a PLMS index of 2.7. Of these, 5 were associated with arousals which resulted in a PLMS arousal index of 0.9.  Titration:   CPAP was tried from 5 to 15cm H2O. BiPAP was tried from 17/13 to 19/13cm H2O.  This was a fully attended sleep study. This test was technically adequate. This patient was titrated on CPAP starting at 5 cm of water pressure. Patient was titrated up to BiPAP 19/13 cm of water pressure.   Did have improvement at CPAP 5 cm of water where he spent 88 minutes of sleep and AHI was 0.7, however as night progressed sleep apnea became harder to control.  As CPAP pressures increased patient was transitioned to BiPAP.  With the increase in CPAP pressures and BiPAP pressures central apneas were more present.    Impression:  1.  Severe complex sleep apnea with an overall AHI of 33.85, central apneas accounted for 63.6% of respiratory events  2.  Patient was tried on CPAP and BiPAP during night of study  3.  Initially had improvement on CPAP at 5  cm water.  4.  As PAP pressures increased on both CPAP and BiPAP central apneas became more present particularly in the second half of the night  5.  Nocturnal hypoxia likely secondary to uncontrolled complex sleep apnea    Recommendations:  I recommend the patient should consider return for an ASV/BiPAP ST titration due to the severity of complex sleep apnea.     In some cases alternative treatment options may be proven effective in resolving sleep apnea. These options include upper airway surgery, the use of a dental orthotic, weight loss, or positional therapy. Clinical correlation is required. In general patients with sleep apnea are advised to avoid alcohol, sedatives and not to operate a motor vehicle while drowsy.  Untreated sleep apnea increases the risk for cardiovascular and neurovascular disease.

## 2023-07-07 ENCOUNTER — OFFICE VISIT (OUTPATIENT)
Dept: SLEEP MEDICINE | Facility: MEDICAL CENTER | Age: 68
End: 2023-07-07
Payer: COMMERCIAL

## 2023-07-07 VITALS
OXYGEN SATURATION: 95 % | HEART RATE: 68 BPM | HEIGHT: 66 IN | DIASTOLIC BLOOD PRESSURE: 74 MMHG | RESPIRATION RATE: 16 BRPM | SYSTOLIC BLOOD PRESSURE: 120 MMHG | BODY MASS INDEX: 31.18 KG/M2 | WEIGHT: 194 LBS

## 2023-07-07 DIAGNOSIS — G47.33 OSA ON CPAP: ICD-10-CM

## 2023-07-07 DIAGNOSIS — G47.31 COMPLEX SLEEP APNEA SYNDROME: ICD-10-CM

## 2023-07-07 PROBLEM — G47.39 COMPLEX SLEEP APNEA SYNDROME: Status: ACTIVE | Noted: 2023-04-03

## 2023-07-07 PROCEDURE — 99213 OFFICE O/P EST LOW 20 MIN: CPT

## 2023-07-07 PROCEDURE — 3074F SYST BP LT 130 MM HG: CPT

## 2023-07-07 PROCEDURE — 3078F DIAST BP <80 MM HG: CPT

## 2023-07-07 NOTE — ASSESSMENT & PLAN NOTE
Sleep Apnea:    The pathophysiology of sleep anea and the increased risk of cardiovascular morbidity from untreated sleep apnea is discussed in detail with the patient.  Urged to avoid supine sleep, weight gain and alcoholic beverages since all of these can worsen sleep apnea. Cautioned against drowsy driving. If feeling sleepy while driving, pull over for a break/nap, rather than persist on the road, in the interest of own safety and that of others on the road.    Titration study was reviewed and discussed with the patient.     - Order placed for BiPAP ST / ASV titration   - Compliance was reinforced  - Recommended the patient against the use of Ozone , such as SoClean  - Clean supplies a couple times a week with dish soap and water and air dry  - Recommended the patient change out supplies as recommended for best mask fit and usage of the machine  - Advised patient to reach out via [email or web address] if any questions or concerns should arise.   - Equipment replacement schedule:  Mask cushion every month  Nasal pillows 2 times per month  Mask every 6 months  Head gear every 6 months  Tubing every 3 months  Ultra-fine filters 2 times per month  Foam filter every 6 months  Humidifier chamber every 6 months  Chin strap every 6 months    Has been advised to continue the current CPAP, clean equipment frequently, and get new mask and supplies as allowed by insurance and DME. Recommend an earlier appointment, if significant treatment barriers develop.    The risks of untreated sleep apnea were discussed with the patient at length. Patients with sleep apnea are at increased risk of cardiovascular disease including coronary artery disease, systemic arterial hypertension, pulmonary arterial hypertension, cardiac arrythmias, and stroke. The patient was advised to avoid driving a motor vehicle when drowsy.    Positive airway pressure will favorably impact many of the adverse conditions and effects provoked by sleep apnea.

## 2023-07-24 ENCOUNTER — SLEEP STUDY (OUTPATIENT)
Dept: SLEEP MEDICINE | Facility: MEDICAL CENTER | Age: 68
End: 2023-07-24
Payer: COMMERCIAL

## 2023-07-24 DIAGNOSIS — G47.33 OSA ON CPAP: ICD-10-CM

## 2023-07-24 PROCEDURE — 95811 POLYSOM 6/>YRS CPAP 4/> PARM: CPT | Performed by: STUDENT IN AN ORGANIZED HEALTH CARE EDUCATION/TRAINING PROGRAM

## 2023-07-25 NOTE — PROCEDURES
Patient: SHAWN SOTO  ID: [id number] Date: 7/24/2023  MONTAGE: Standard  STUDY TYPE: Treatment  RECORDING TECHNIQUE:   After the scalp was prepared, gold plated electrodes were applied to the scalp according to the International 10-20 System. EEG (electroencephalogram) was continuously monitored from the O1-M2, O2-M1, C3-M2, C4-M1, F3-M2, and F4-M1. EOGs (electrooculograms) were monitored by electrodes placed at the left and right outer canthi. Chin EMG (electromyogram) was monitored by electrodes placed on the mentalis and sub-mentalis muscles. Nasal and oral airflow were monitored using a triple port thermocouple as well as oronasal pressure transducer. Respiratory effort was measured by inductive plethysmography technology employing abdominal and thoracic belts. Blood oxygen saturation and pulse were monitored by pulse oximetry. Heart rhythm was monitored by surface electrocardiogram. Leg EMG was studied using surface electrodes placed on left and right anterior tibialis. A microphone was used to monitor tracheal sounds and snoring. Body position was monitored and documented by technician observation.   SCORING CRITERIA:   A modification of the AASM manual for scoring of sleep and associated events was used. Obstructive apneas were scored by cessation of airflow for at least 10 seconds with continuing respiratory effort. Central apneas were scored by cessation of airflow for at least 10 seconds with no respiratory effort. Hypopneas were scored by a 30% or more reduction in airflow for at least 10 seconds accompanied by arterial oxygen desaturation of 3% or an arousal. For CMS (Medicare) patients, per AASM rule 1B, hypopneas are scored by 30% with mild reduction in airflow for at least 10 seconds accompanied by arterial saturation decreased at 4%.  Study start time was 10:10:28 PM. Diagnostic recording time was 7h 45.0m with a total sleep time of 6h 46.5m resulting in a sleep efficiency of 87.42%%. Sleep  latency from the start of the study was 14 minutes and the latency from sleep to REM was 254 minutes. In total,29 arousals were scored for an arousal index of 4.3.  Respiratory:  There were a total of 2 apneas consisting of 0 obstructive apneas, 0 mixed apneas, and 2 central apneas. A total of 40 hypopneas were scored. The apnea index was 0.30 per hour and the hypopnea index was 5.90 per hour resulting in an overall AHI of 6.20. AHI during REM was 3.5 and AHI while supine was 17.26.  Oximetry:  There was a mean oxygen saturation of 93.0%. The minimum oxygen saturation in NREM was 86.0 % and in REM was 88.0%. The patient spent 0.7 minutes of TST with SaO2 <88%.  Cardiac:  The highest heart rate seen while awake was 87 BPM while the highest heart rate during sleep was 88 BPM with an average sleeping heart rate of 64 BPM.  Limb Movements:  There were a total of 237 PLMs during sleep which resulted in a PLMS index of 35.0. Of these, 6 were associated with arousals which resulted in a PLMS arousal index of 0.9.  Titration:   ASV was tried from EPAP: 5, PS: 15/3 to EPAP: 11, PS: 14/3  This was a fully attended sleep study. This test was technically adequate. This patient was titrated on ASV starting at EPAP 5 PS 3-15 cm of water pressure. Patient was titrated up to ASV EPAP 11 PS 3-14 cm of water pressure. Patient did best at ASV EPAP 11 PS 3-14 cm of water pressure. Patient spent 56 minutes at that pressure and the AHI was 0 which is considered treated sleep apnea.     Impression:  1.  Patient used ASV therapy during the night of the study  2.  No supine REM sleep was seen during night of study  3.  Respiratory events improved with increasing EPAP pressures    Recommendations:  I recommend ASV EPAP 11 pressure support 3-14 cmH2O .  Patient used a small/medium Pierre mask during night of study.     I also recommend 60 day compliance download to assess the efficacy to the recommended pressure, measure leak, apnea hypopnea  index and compliance for further outpatient monitoring and management of PAP therapy. In some cases alternative treatment options may be proven effective in resolving sleep apnea. These options include upper airway surgery, the use of a dental orthotic, weight loss, or positional therapy. Clinical correlation is required. In general patients with sleep apnea are advised to avoid alcohol, sedatives and not to operate a motor vehicle while drowsy.  Untreated sleep apnea increases the risk for cardiovascular and neurovascular disease.

## 2023-08-07 NOTE — PROGRESS NOTES
CC: Recent titration results        HPI:  Eriberto Young is a 68 y.o.male  kindly referred by Ila Mota M.D. and last seen by Noelle Johnson on 7/7/2023 when an ASV titration was ordered.  This was performed on 7/24/2023 and the patient did best on EPAP 11 and pressure support 14/3 CWP with a resultant AHI of 0 and a rigo saturation of 90%.  No supine or REM sleep was recorded on this final setting.  The patient used a small/medium Pierre fullface mask and heated humidification.    Accompanied by his wife Kallie today.    Significant comorbidities and modifying factors include Mosquera's esophagus, dementia, ataxia, complex partial epilepsy, epilepsy, history of TBI, tremor, migraines, diabetes mellitus type 2, GERD, obesity, and never smoker.            Patient Active Problem List    Diagnosis Date Noted    Complex sleep apnea syndrome 04/03/2023    Swelling of limb, left 07/06/2021    Hypomagnesemia 07/05/2021    Trauma 07/02/2021    Hydrocephalus (HCC) 07/02/2021    Epileptic seizure (HCC) 07/02/2021    Dementia (HCC) 07/02/2021    Obesity (BMI 30.0-34.9) 07/02/2021    Adjustment reaction with anxiety and depression 11/07/2017    Dementia associated with other underlying disease without behavioral disturbance (HCC) 10/25/2016    Mosquera's esophagus 04/26/2016    Post concussion syndrome 11/10/2014    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 05/05/2014    Headache, chronic migraine without aura, intractable 05/05/2014    Complex partial epilepsy, intractable (HCC) 12/02/2013    Headache, tension type, chronic 12/02/2013    Tremor due to multiple drugs 12/02/2013     (ventriculoperitoneal) shunt status 12/02/2013    DDD (degenerative disc disease), cervical 12/02/2013       Past Medical History:   Diagnosis Date    Anxiety     Chronic pain     Complex partial epilepsy, intractable (HCC)     Depression     Diabetes (HCC)     Head injuries     Hydrocephalus (HCC)     w/ shunt    Post concussion syndrome 11/10/2014     Self-injurious behavior     Suicide attempt (HCC)         Past Surgical History:   Procedure Laterality Date    ABDOMINAL EXPLORATION      ACHILLES TENDON REPAIR      CRANIOTOMY      HERNIA REP HIATAL      times 2    HERNIA REPAIR      LAMINOTOMY         Family History   Problem Relation Age of Onset    Alcohol abuse Father     ADD / ADHD Maternal Grandfather     ADD / ADHD Maternal Grandmother     Dementia Maternal Grandmother        Social History     Socioeconomic History    Marital status:      Spouse name: Not on file    Number of children: Not on file    Years of education: Not on file    Highest education level: Not on file   Occupational History    Not on file   Tobacco Use    Smoking status: Never    Smokeless tobacco: Never   Vaping Use    Vaping Use: Never used   Substance and Sexual Activity    Alcohol use: Yes     Alcohol/week: 1.2 oz     Types: 2 Glasses of wine per week    Drug use: No    Sexual activity: Not on file   Other Topics Concern    Not on file   Social History Narrative    Not on file     Social Determinants of Health     Financial Resource Strain: Not on file   Food Insecurity: Not on file   Transportation Needs: Not on file   Physical Activity: Not on file   Stress: Not on file   Social Connections: Not on file   Intimate Partner Violence: Not on file   Housing Stability: Not on file       Current Outpatient Medications   Medication Sig Dispense Refill    MOUNJARO 2.5 MG/0.5ML Solution Pen-injector 2.5MG SUBCUTANEOUS ONCE PER WEEK 30 DAYS      ketorolac (TORADOL) 10 MG Tab Take 1 Tablet by mouth every four hours as needed for Moderate Pain. 10 Tablet 5    GEMTESA 75 MG Tab Take 1 Tablet by mouth every day.      Memantine HCl ER (NAMENDA) 28 MG CAPSULE SR 24 HR Take 1 Capsule by mouth every day. 90 Capsule 3    propranolol LA (INDERAL LA) 60 MG CAPSULE SR 24 HR TAKE 1 CAPSULE BY MOUTH EVERY DAY 90 Capsule 3    atorvastatin (LIPITOR) 10 MG Tab Take 1 Tablet by mouth every evening.    "   metformin (GLUCOPHAGE) 1000 MG tablet Take 1 Tablet by mouth 2 times a day with meals.      Zoster Vac Recomb Adjuvanted (SHINGRIX) 50 MCG/0.5ML Recon Susp       Influenza Vac Subunit Quad (FLUCELVAX QUADRIVALENT) 0.5 ML Suspension Prefilled Syringe injection       DULoxetine (CYMBALTA) 60 MG Cap DR Particles delayed-release capsule Take 1 Capsule by mouth every evening.      therapeutic multivitamin-minerals (THERAGRAN-M) Tab Take 1 Tablet by mouth every day.      omega-3 acid ethyl esters (LOVAZA) 1 GM capsule Take 1 Capsule by mouth 2 times a day.      traZODone (DESYREL) 50 MG Tab Take 1 Tablet by mouth at bedtime as needed for Sleep.      EMGALITY 120 MG/ML Solution Auto-injector 120 mg by Injection route.      Ibuprofen 200 MG Cap None Entered      pantoprazole (PROTONIX) 40 MG Tablet Delayed Response TAKE 1 TABLET BY MOUTH DAILY, 30 MIN BEFORE BREAKFAST  1    hydrocodone-acetaminophen (NORCO) 5-325 MG TABS per tablet Take 1-2 Tabs by mouth every four hours as needed. 30 Tab 5    M-VIT PO       divalproex (DEPAKOTE) 250 MG Tablet Delayed Response divalproex 250 mg tablet,delayed release   TAKE 1 TABLET BY MOUTH 2 TIMES A DAY      loperamide (IMODIUM) 2 MG Cap Take 1 capsule by mouth 4 times a day as needed for Diarrhea. 10 capsule 0    BOTOX 200 UNITS Recon Soln        No current facility-administered medications for this visit.    \"CURRENT RX\"    ALLERGIES: Ativan, Hydromorphone, Ativan, Hydromorphone hcl, and Nkda [no known drug allergy]    ROS    Constitutional: Denies fever, chills, sweats,  weight loss, fatigue  Cardiovascular: Denies chest pain, tightness, palpitations, swelling in legs/feet, fainting, difficulty breathing when lying down but gets better when sitting up.   Respiratory: Denies shortness of breath, cough, sputum, wheezing, painful breathing, coughing up blood.   Sleep: per HPI  Gastrointestinal: Denies  difficulty swallowing, nausea, abdominal pain, diarrhea, constipation, " "heartburn.  Musculoskeletal: Denies painful joints, sore muscles, back pain.        PHYSICAL EXAM      /84 (BP Location: Left arm, Patient Position: Sitting, BP Cuff Size: Adult)   Pulse 62   Resp 16   Ht 1.676 m (5' 6\")   Wt 91.6 kg (202 lb)   SpO2 92%   BMI 32.60 kg/m²   Appearance: Well-nourished, well-developed, no acute distress  Eyes:  PERRLA, EOMI  ENMT: without change  Neck: Supple, trachea midline, no masses  Respiratory effort:  No intercostal retractions or use of accessory muscles  Lung auscultation:  No wheezes rhonchi rubs or rales  Cardiac: No murmurs, rubs, or gallops; regular rhythm, normal rate; no edema  Abdomen:  No tenderness, no organomegaly.  Musculoskeletal:  Grossly normal; gait and station normal; digits and nails normal  Skin:  No rashes, petechiae, cyanosis  Neurologic: without focal signs; oriented to person, time, place, and purpose; judgement intact  Psychiatric:  No depression, anxiety, agitation      Medical Decision Making       The medical record was reviewed in its entirety including the referral notes, records from primary care, consultants notes, hospital records, lab, imaging, microbiology, immunology, and immunizations.  Care gaps identified and reviewed with the patient.    Diagnostic and titration nocturnal polysomnogram's, home sleep apnea tests, continuous nocturnal oximetry results, multiple sleep latency tests, and compliance reports reviewed.  1. Treatment-emergent central sleep apnea  - DME ASV    Other orders  - MOUNJARO 2.5 MG/0.5ML Solution Pen-injector; 2.5MG SUBCUTANEOUS ONCE PER WEEK 30 DAYS      PLAN:   Orders written for ASV with EPAP 11 CWP and pressure support 14/3 CWP using a small/medium Pierre fullface mask and heated humidification.    The risks of untreated sleep apnea were discussed with the patient at length. Patients with CHIRAG are at increased risk of cardiovascular disease including systemic arterial hypertension, pulmonary arterial " hypertension (transient or fixed), TIA, and an elevated risk of stroke, heart attack, sudden death, or arrhythmia between the hours of 11 PM and 6 AM. CHIRAG patients have an increased risk of motor vehicle accidents, type 2 diabetes, GERD, repetitive mechanical trauma to pharyngeal muscles with inflammation and denervation, frequent EEG arousals leading to nonrestorative sleep, excessive daytime sleepiness, fatigue, depression, poor pain control, irritability, and lower quality of life.  The patient was advised to avoid driving a motor vehicle when drowsy.    Positive airway pressure will favorably impact many of the adverse conditions and effects provoked by CHIRAG.    Have advised the patient to follow up with the appropriate healthcare practitioners for all other medical problems and issues.    Return in about 10 weeks (around 10/23/2023).    Total time 30 minutes

## 2023-08-14 ENCOUNTER — OFFICE VISIT (OUTPATIENT)
Dept: SLEEP MEDICINE | Facility: MEDICAL CENTER | Age: 68
End: 2023-08-14
Attending: INTERNAL MEDICINE
Payer: COMMERCIAL

## 2023-08-14 VITALS
BODY MASS INDEX: 32.47 KG/M2 | OXYGEN SATURATION: 92 % | RESPIRATION RATE: 16 BRPM | WEIGHT: 202 LBS | HEART RATE: 62 BPM | DIASTOLIC BLOOD PRESSURE: 84 MMHG | SYSTOLIC BLOOD PRESSURE: 124 MMHG | HEIGHT: 66 IN

## 2023-08-14 DIAGNOSIS — G47.39 TREATMENT-EMERGENT CENTRAL SLEEP APNEA: ICD-10-CM

## 2023-08-14 DIAGNOSIS — G47.31 CENTRAL SLEEP APNEA: ICD-10-CM

## 2023-08-14 PROCEDURE — 99212 OFFICE O/P EST SF 10 MIN: CPT | Performed by: INTERNAL MEDICINE

## 2023-08-14 PROCEDURE — 99214 OFFICE O/P EST MOD 30 MIN: CPT | Performed by: INTERNAL MEDICINE

## 2023-08-14 PROCEDURE — 3079F DIAST BP 80-89 MM HG: CPT | Performed by: INTERNAL MEDICINE

## 2023-08-14 PROCEDURE — 3074F SYST BP LT 130 MM HG: CPT | Performed by: INTERNAL MEDICINE

## 2023-08-14 RX ORDER — TIRZEPATIDE 2.5 MG/.5ML
INJECTION, SOLUTION SUBCUTANEOUS
COMMUNITY
Start: 2023-07-25

## 2023-08-29 ENCOUNTER — TELEPHONE (OUTPATIENT)
Dept: NEUROLOGY | Facility: MEDICAL CENTER | Age: 68
End: 2023-08-29
Payer: COMMERCIAL

## 2023-08-29 NOTE — TELEPHONE ENCOUNTER
Eriberto Young [facility]  Phone Number: [phone]     Hi Doc Bloch,  Lately I've been experiencing achy, cramping in my joints, specifically in my arms, elbos and legs.  I went to my pain doctor today for my botox and asked them about it.  She observed me having the discomfort and said I should contact you to review.  Should we make an earlier appointment to discuss?  Thanks     Sent via [email or web address]    Please advise pt has appt in November

## 2023-09-22 DIAGNOSIS — G43.019 INTRACTABLE MIGRAINE WITHOUT AURA AND WITHOUT STATUS MIGRAINOSUS: ICD-10-CM

## 2023-09-22 DIAGNOSIS — G25.1 TREMOR DUE TO MULTIPLE DRUGS: ICD-10-CM

## 2023-09-25 RX ORDER — PROPRANOLOL HCL 60 MG
60 CAPSULE, EXTENDED RELEASE 24HR ORAL DAILY
Qty: 90 CAPSULE | Refills: 3 | Status: SHIPPED | OUTPATIENT
Start: 2023-09-25

## 2023-11-08 ENCOUNTER — OFFICE VISIT (OUTPATIENT)
Dept: NEUROLOGY | Facility: MEDICAL CENTER | Age: 68
End: 2023-11-08
Attending: PSYCHIATRY & NEUROLOGY
Payer: COMMERCIAL

## 2023-11-08 ENCOUNTER — PATIENT MESSAGE (OUTPATIENT)
Dept: SLEEP MEDICINE | Facility: MEDICAL CENTER | Age: 68
End: 2023-11-08

## 2023-11-08 VITALS
BODY MASS INDEX: 32.49 KG/M2 | SYSTOLIC BLOOD PRESSURE: 118 MMHG | HEART RATE: 72 BPM | DIASTOLIC BLOOD PRESSURE: 66 MMHG | WEIGHT: 202.16 LBS | TEMPERATURE: 97 F | HEIGHT: 66 IN | OXYGEN SATURATION: 97 %

## 2023-11-08 DIAGNOSIS — G43.719 INTRACTABLE CHRONIC MIGRAINE WITHOUT AURA AND WITHOUT STATUS MIGRAINOSUS: ICD-10-CM

## 2023-11-08 DIAGNOSIS — G47.39 TREATMENT-EMERGENT CENTRAL SLEEP APNEA: ICD-10-CM

## 2023-11-08 DIAGNOSIS — G47.33 OSA ON CPAP: ICD-10-CM

## 2023-11-08 DIAGNOSIS — G47.33 OBSTRUCTIVE SLEEP APNEA: ICD-10-CM

## 2023-11-08 DIAGNOSIS — F02.80 DEMENTIA ASSOCIATED WITH OTHER UNDERLYING DISEASE WITHOUT BEHAVIORAL DISTURBANCE (HCC): ICD-10-CM

## 2023-11-08 DIAGNOSIS — G25.1 TREMOR DUE TO MULTIPLE DRUGS: ICD-10-CM

## 2023-11-08 DIAGNOSIS — G40.509 NONINTRACTABLE EPILEPSY DUE TO EXTERNAL CAUSES, WITHOUT STATUS EPILEPTICUS (HCC): ICD-10-CM

## 2023-11-08 PROCEDURE — 99212 OFFICE O/P EST SF 10 MIN: CPT | Performed by: PSYCHIATRY & NEUROLOGY

## 2023-11-08 PROCEDURE — 3078F DIAST BP <80 MM HG: CPT | Performed by: PSYCHIATRY & NEUROLOGY

## 2023-11-08 PROCEDURE — 3074F SYST BP LT 130 MM HG: CPT | Performed by: PSYCHIATRY & NEUROLOGY

## 2023-11-08 PROCEDURE — 99214 OFFICE O/P EST MOD 30 MIN: CPT | Performed by: PSYCHIATRY & NEUROLOGY

## 2023-11-08 RX ORDER — FAMOTIDINE 20 MG/1
20 TABLET, FILM COATED ORAL DAILY
COMMUNITY
Start: 2023-10-17

## 2023-11-08 RX ORDER — ZIPRASIDONE HYDROCHLORIDE 20 MG/1
20 CAPSULE ORAL DAILY
COMMUNITY

## 2023-11-08 RX ORDER — GLIPIZIDE 5 MG/1
5 TABLET, FILM COATED, EXTENDED RELEASE ORAL DAILY
COMMUNITY
Start: 2023-10-29

## 2023-11-08 RX ORDER — ZOLPIDEM TARTRATE 5 MG/1
5 TABLET ORAL NIGHTLY PRN
COMMUNITY
Start: 2023-10-17

## 2023-11-08 RX ORDER — RIMEGEPANT SULFATE 75 MG/75MG
75 TABLET, ORALLY DISINTEGRATING ORAL
COMMUNITY

## 2023-11-09 NOTE — PROGRESS NOTES
Chief Complaint   Patient presents with    Follow-Up     Headache   Nurtec losing effectiveness   Short term memory issues       Problem List Items Addressed This Visit       Tremor due to multiple drugs     Pt has some tremor but he can compensate for it. Pt takes propanolol which also helps. Pt states is is worse under stress.         Headache, chronic migraine without aura, intractable     Headache has been worse with the recent storms which is his pattern.         Relevant Medications    Rimegepant Sulfate (NURTEC) 75 MG TABLET DISPERSIBLE    Dementia associated with other underlying disease without behavioral disturbance (HCC)     This is stable except for worse short term memory and less motivation which may be the addition of cymbalta and geodon per wife's observation.         Relevant Medications    zolpidem (AMBIEN) 5 MG Tab    ziprasidone (GEODON) 20 MG Cap    Epileptic seizure (HCC)     Pt has had fewer episodes lately.         Relevant Medications    zolpidem (AMBIEN) 5 MG Tab    Rimegepant Sulfate (NURTEC) 75 MG TABLET DISPERSIBLE    Obstructive sleep apnea     Pt does use his CPAP            History of present illness:  Eriberto Young 68 y.o. male presents today for neurologic issues secondary to head trauma from a fall.pt with some worsening of symptoms with fall weather change and new psych meds.    Past medical history:   Past Medical History:   Diagnosis Date    Anxiety     Chronic pain     Complex partial epilepsy, intractable (HCC)     Depression     Diabetes (HCC)     Head injuries     Hydrocephalus (HCC)     w/ shunt    Post concussion syndrome 11/10/2014    Self-injurious behavior     Suicide attempt ([facility])        Past surgical history:   Past Surgical History:   Procedure Laterality Date    ABDOMINAL EXPLORATION      ACHILLES TENDON REPAIR      CRANIOTOMY      HERNIA REP HIATAL      times 2    HERNIA REPAIR      LAMINOTOMY         Family history:   Family History   Problem Relation Age of  Onset    Alcohol abuse Father     ADD / ADHD Maternal Grandfather     ADD / ADHD Maternal Grandmother     Dementia Maternal Grandmother        Social history:   Social History     Socioeconomic History    Marital status:      Spouse name: Not on file    Number of children: Not on file    Years of education: Not on file    Highest education level: Not on file   Occupational History    Not on file   Tobacco Use    Smoking status: Never    Smokeless tobacco: Never   Vaping Use    Vaping Use: Never used   Substance and Sexual Activity    Alcohol use: Yes     Alcohol/week: 1.2 oz     Types: 2 Glasses of wine per week     Comment: rarely    Drug use: No    Sexual activity: Not on file   Other Topics Concern    Not on file   Social History Narrative    Not on file     Social Determinants of Health     Financial Resource Strain: Not on file   Food Insecurity: Not on file   Transportation Needs: Not on file   Physical Activity: Not on file   Stress: Not on file   Social Connections: Not on file   Intimate Partner Violence: Not on file   Housing Stability: Not on file       Current medications:   Current Outpatient Medications   Medication    glipiZIDE ER (GLUCOTROL XL) 5 MG TABLET SR 24 HR    famotidine (PEPCID) 20 MG Tab    zolpidem (AMBIEN) 5 MG Tab    ziprasidone (GEODON) 20 MG Cap    Rimegepant Sulfate (NURTEC) 75 MG TABLET DISPERSIBLE    propranolol LA (INDERAL LA) 60 MG CAPSULE SR 24 HR    MOUNJARO 2.5 MG/0.5ML Solution Pen-injector    ketorolac (TORADOL) 10 MG Tab    divalproex (DEPAKOTE) 250 MG Tablet Delayed Response    GEMTESA 75 MG Tab    Memantine HCl ER (NAMENDA) 28 MG CAPSULE SR 24 HR    atorvastatin (LIPITOR) 10 MG Tab    metformin (GLUCOPHAGE) 1000 MG tablet    Zoster Vac Recomb Adjuvanted (SHINGRIX) 50 MCG/0.5ML Recon Susp    Influenza Vac Subunit Quad (FLUCELVAX QUADRIVALENT) 0.5 ML Suspension Prefilled Syringe injection    loperamide (IMODIUM) 2 MG Cap    DULoxetine (CYMBALTA) 60 MG Cap DR Particles  delayed-release capsule    therapeutic multivitamin-minerals (THERAGRAN-M) Tab    omega-3 acid ethyl esters (LOVAZA) 1 GM capsule    traZODone (DESYREL) 50 MG Tab    EMGALITY 120 MG/ML Solution Auto-injector    Ibuprofen 200 MG Cap    BOTOX 200 UNITS Recon Soln    pantoprazole (PROTONIX) 40 MG Tablet Delayed Response    hydrocodone-acetaminophen (NORCO) 5-325 MG TABS per tablet    M-VIT PO     No current facility-administered medications for this visit.       Medication Allergy:  Allergies   Allergen Reactions    Ativan Unspecified     Okay with small amount    Hydromorphone Unspecified     Okay with small amount    Ativan      High doses    Hydromorphone Hcl     Nkda [No Known Drug Allergy]        Review of systems:   Constitutional: denies fever, night sweats, weight loss.   Eyes: denies acute vision change, eye pain or secretion.   Ears, Nose, Mouth, Throat: denies nasal secretion, nasal bleeding, difficulty swallowing, hearing loss, tinnitus, vertigo, ear pain, acute dental problems, oral ulcers or lesions.   Endocrine: denies recent weight changes, heat or cold intolerance, polyuria, polydypsia, polyphagia,abnormal hair growth.  Cardiovascular: denies new onset of chest pain, palpitations, syncope, or dyspnea of exertion.  Pulmonary: denies shortness of breath, new onset of cough, hemoptysis, wheezing, chest pain or flu-like symptoms.   GI: denies nausea, vomiting, diarrhea, GI bleeding, change in appetite, abdominal pain, and change in bowel habits.  : denies dysuria, urinary incontinence, hematuria.  Heme/oncology: denies history of easy bruising or bleeding. No history of cancer, DVTor PE.  Allergy/immunology: denies hives/urticaria, or itching.   Dermatologic: denies new rash, or new skin lesions.  Musculoskeletal:denies joint swelling or pain, muscle pain, neck and back pain. Neurologic: denies headaches, acute visual changes, facial droopiness, muscle weakness (focal or generalized), paresthesias,  "anesthesia, ataxia, change in speech or language, memory loss, abnormal movements, seizures, loss of consciousness, or episodes of confusion.   Psychiatric: denies symptoms of depression, anxiety, hallucinations, mood swings or changes, suicidal or homicidal thoughts.     Physical examination:   Vitals:    11/08/23 1059   BP: 118/66   BP Location: Right arm   Patient Position: Sitting   BP Cuff Size: Adult   Pulse: 72   Temp: 36.1 °C (97 °F)   TempSrc: Temporal   SpO2: 97%   Weight: 91.7 kg (202 lb 2.6 oz)   Height: 1.676 m (5' 5.98\")     General: Patient in no acute distress, pleasant and cooperative.  HEENT: Normocephalic, no signs of acute trauma.   Neck: supple, no meningeal signs or carotid bruits. There is normal range of motion. No tenderness on exam.   Chest: clear to auscultation. No cough.   CV: RRR, no murmurs.   Skin: no signs of acute rashes or trauma.   Musculoskeletal: joints exhibit full range of motion, without any pain to palpation. There are no signs of joint or muscle swelling. There is no tenderness to deep palpation of muscles.   Psychiatric: No hallucinatory behavior. Denies symptoms of depression or suicidal ideation. Mood and affect appear normal on exam.     NEUROLOGICAL EXAM:   Mental status, orientation: Awake, alert and fully oriented.   Speech and language: speech is clear and fluent. The patient is able to name, repeat and comprehend.   Memory: There is intact recollection of recent and remote events.   Cranial nerve exam: Pupils are 3-4 mm bilaterally and equally reactive to light and accommodation. Visual fields are intact by confrontation. Fundoscopic exam was unremarkable. There is no nystagmus on primary or secondary gaze. Intact full EOM in all directions of gaze. Face appears symmetric. Sensation in the face is intact to light touch. Uvula is midline. Palate elevates symmetrically. Tongue is midline and without any signs of tongue biting or fasciculations. Sternocleidomastoid " muscles exhibit is normal strength bilaterally. Shoulder shrug is intact bilaterally.   Motor exam: Strength is 5/5 in all extremities. Tone is normal. Resting and action tremor with amplification   Sensory exam reveals normal sense of light touch, proprioception, vibration and pinprick in all extremities.   Deep tendon reflexes:  3+ throughout. Plantar responses are flexor. There is no clonus.   Coordination: shows a normal finger-nose-finger. Normal rapidly alternating movements.   Gait: The patient was able to get up from seated position on first attempt without requiring assistance. Found to be unsteady when walking. Movements were fluid with decreased arm swing. The patient was able to turn without difficulties or tendency to fall. Romberg examination pos      ANCILLARY DATA REVIEWED:     Lab Data Review:  No results found for this or any previous visit (from the past 24 hour(s)).    Records reviewed: labs      Imaging: last neuroimaging          ASSESSMENT AND PLAN:    1. 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    2. Obstructive sleep apnea  CPAP does help with symptoms and encouraged continue use    3. Nonintractable epilepsy due to external causes, without status epilepticus (HCC)  Refill topamax and VPA    4. Intractable chronic migraine without aura and without status migrainosus  Continue with botox and hydration. Nurtec for breakthrough    5. Dementia associated with other underlying disease without behavioral disturbance (HCC)  Refill memantine. Discussed the importance of regular exercise for patient in the am with the ashly.        FOLLOW-UP:   Return in about 6 months (around 5/8/2024).      My total time spent caring for the patient on the day of the encounter was 34 minutes.   This does not include time spent on separately billable procedures/tests.     EDUCATION AND COUNSELING:  -Discussed regular exercise program and prevention of cardiovascular disease,  including stroke.   -Discussed healthy lifestyle, including: healthy diet (rich in fruits, vegetables, nuts and healthy oils); proper hydration, and adequate sleep hygiene (allowing 7-8 hrs of overnight sleep).        Melissa Bloch, MD  Clinical  of Neurology [facility].   Diplomate in Neurology.   Office: [phone]  Fax: [phone]

## 2023-11-09 NOTE — ASSESSMENT & PLAN NOTE
This is stable except for worse short term memory and less motivation which may be the addition of cymbalta and geodon per wife's observation.

## 2023-11-09 NOTE — ASSESSMENT & PLAN NOTE
Pt has some tremor but he can compensate for it. Pt takes propanolol which also helps. Pt states is is worse under stress.

## 2024-01-03 NOTE — ASSESSMENT & PLAN NOTE
[address] [address]    OPERATIVE REPORT    PATIENT NAME: Nikia Urrutia  MR#: [id number]    DATE OF PROCEDURE: 1/3/2024  PREOPERATIVE DIAGNOSIS: Degenerative Arthritis (e.g., OA) of the Left hip  POSTOPERATIVE DIAGNOSIS: Degenerative Arthritis (e.g., OA) of the Left hip  SECONDARY DIAGNOSIS: Other  OPERATION PERFORMED: Left total hip arthroplasty  SURGEON: Williams No  ASSISTANT(S): 1st: Sammi Rutherford, 2nd: Kyara LYON and 3rd: Haylie Schulte  YOB: 1949  ANESTHESIA: Spinal  BLOOD LOSS: 300  DRAIN: None  FLUIDS: 800  COMPLICATIONS: None  FINDINGS: Degenerative Arthritis (e.g., OA)  SPECIMENS: The Left femoral head was sent to pathology  IMPLANTS:  Femoral Stem: Debby - Dahl Cone Prosthesis Hip Stem 135 (Porous, Distal Taper, 135 degree, 20 x 126.8mm, 12/14 cone, [id number], Lot#: [id number])  Femoral Head: Debby - Biolox Delta Femoral Head (36mm, 0, Lot#: [id number])  Acetabular Cup: Biomet - G7 OsseoTi Limited Hole Acetabular Shell (Size E, 52mm, Lot#: [id number])  Acetabular Liner: Debby - G7 Vivacit-E Vitamin E Highly Crosslinked Liner (Neutral, Size E, 36mm ID, Lot#: [id number])  Other Devices:  Debby - Trilogy Bone Screw (6.5 x 20mm, Self-Tapping, Lot#: [id number])  Debby - Bone Screw (6.5x30mm, Self-Tapping, Lot#: [id number])      DISPOSITION: The patient was taken to the recovery room in stable condition.  BMI: 23.3    INDICATIONS: The patient is a 74-year-old woman who presented to the office with progressively worsening left hip pain. Her pain was refractory to all forms on non-operative management including anti-inflammatories, activity modification of the hip and corticosteroid injection. The patient`s pain progressed to the point that her activities of daily living are limited and she has a very limited range of motion and limited ability to walk. This has negatively affected her ability to perform the basic activities of daily living. Based upon  Fall from ladder with head trauma  Extensive trauma imaging in the ER was negative  Dr. Danielle, trauma surgery, was consulted and request medical team to take over care  He has been cleared for removal of the hard collar   her radiographs, that showed severe degenerative joint disease of the left hip, she was indicated for a left total hip arthroplasty. We reviewed the risks, benefits and alternatives to the procedure and informed consent was obtained. The risks discussed included, but were not limited, to infection, nerve injury (sciatic and femoral nerves), arterial injury (femoral artery and it's branches), deep venous thrombosis, pulmonary embolus, wear, lysis, need for reoperation, incision numbness, dislocation, leg length discrepancy, loss of limb and loss of life. The patient understood these and informed consent was obtained as was medical clearance and there was no contraindications to surgery today.    OPERATIVE TECHNIQUE: On Wednesday, January 3, 2024, the patient was identified in the holding area and taken to the operating room. Prior to going to the operating room 1300mg of oral tranexamic acid was administered in the holding area for intra-operative and post-operative blood management. After preoperative antibiotics 2 grams of Vancomycin and Clindamycin were ordered to be completed within 24 hours of the surgery) were administered, Ms. Urrutia was given an Spinal anestheticand a hyman catheter inserted under sterile conditions. The patient was then laid in the lateral decubitus position with her left hip facing up. Her pelvis was securely affixed to the operating room table and the bony prominences on the contralateral side were well padded. We then sterilely prepped and draped the left lower extremity in standard surgical fashion and a timeout was called. It was noted that the left side was the appropriate side for the surgery and we were allowed to proceed. We then effected an incision over the posterior aspect of the patient's left hip, dissecting down through the dermal and epidermal layers into the subcutaneous tissue. Bovie cautery was used to maintain hemostasis as we dissected sharply down to the level of the  deep fascia. The deep fascia was identified and incised in line with our incision and our Charnley retractor was placed deep to this layer.    We then identified the external rotators on the posterior aspect of the proximal femur and elevated these muscles and the capsular layer off the posterior aspect of the proximal femur as a single sleeve of tissue using the bovie cautery. This gave us excellent visualization of the hip joint and protection of the sciatic nerve. We then dislocated the hip with flexion, adduction and internal rotation and measured approximately a 5mm neck osteotomy proximal to the lesser trochanter as we templated pre-operatively. A sagittal saw was then used to make this cut and the femoral head was then removed and sent off to pathology. We then placed retractors circumferentially around the acetabulum and debrided the ligamentum teres, labrum and pulvinar. Once these were debrided, we had excellent visualization of the hip joint and we were able to successfully ream up to a size 52 reamer for a 52 G7 OsseoTi Limited Hole Acetabular Shell acetabular component. At this point we had an excellent bleeding bed of cancellous bone for component implantation. We then opened up our component and implanted in approximately 40 degrees of abduction and 20 degrees of anteversion. Once this was achieved we placed 2 screws through the posterior-superior quadrant of the cup obtaining good purchase with these screws. We then irrigated the wound with pulsatile lavage and placed our G7 Vivacit-E Vitamin E Highly Crosslinked Liner polyethylene liner for a 36mm diameter femoral head. We then impacted the liner into place assuring the locking mechanism was engaged and directed our attention towards the femur.    We lifted the femur from the wound using a femoral elevator and removed the lateral aspect of the femoral neck using the box osteotome. We then used a Charnley awl to open the medullary canal and a  lateralizing reamer proximally. We then successfully broached up to a size 20after using tapered reamers up to a size 20 and noted that we had excellent fit and fill proximally within the femur and good rotational and axial stability. We then trialed our 36mm, 0mm offset head and noted that we had good range of motion, no impingement and good stability of the hip and that this would be our final implant. We then opened up our size 20 Standard offset Dahl Cone Prosthesis Hip Stem 135 primary hip prosthesis and impacted it in approximately 15 degrees of anteversion. Once the stem was fully seated, we then cleaned and dried the trunion of the stem and placed our 36mm, 0mm offset trial femoral head in place and impacted it into position. Once it was fully seated, we then reduced the hip and took it through a range of motion. We had excellent range of motion, good stability and no impingement. Our leg lengths appeared to be close to equal, as measured at the medial malleoli, and at this point in time we inserted our final size 36mm, 0mm offset head. The martinez taper was cleaned and dried. This was impacted into place and once the Martinez taper was engaged, we again reduced the hip and then closed our short external rotator and capsular layer to the posterior aspect of the proximal femur and the gluteus medius tendon. The capsule was closed with a Ethibond #5 in interrupted fashion. Fascia closure was accomplished with a Quill #2 in continuous fashion. The subcutaneous layer was closed with Monocryl #2-0 in interrupted fashion. Skin closure was performed with a Monocryl #3-0 in interrupted fashion. A hemovac drain was not required.A sterile dressing was then placed over the hip. The patient was aroused in the operating room and taken to the recovery room in stable condition. Prior to leaving the OR all needle and sponge counts were correct.    Postoperatively, she will be weight bearing as tolerated. She will work with  physical therapy. She will be on deep venous thrombosis prophylaxis for the next three weeks and jere-operative antibiotics for the next 24 hours.Based on medical history and extent of the surgery, the patient will be admitted to the hospital as an inpatient with an anticipated length of stay of 2-3 nights prior to discharge.        Surgery was performed on 1/3/2024. The procedure performed was a Primary IESHA. The surgical assistant was Sammi Rutherford. They were an active participant in the case and participated as a surgical assistant in all critical and noncritical steps including:  - Retractor placement and holding  - Operating room setup and patient positioning  - Closure of the various layers of soft tissue and skin  - Insertion of pins and holding screws of guides  - Dressing placement  - Transfer of patient to the stretcher and transport to the recovery room  Sammi Rutherford was a critical part of the case, and the surgery could not be performed without a qualified surgical assistant.    I was present for all critical portions of the surgery and a backup surgeon was available at all times in case of emergency.    DICTATED BY: Williams No,  2024-01-03    SIGNED: 2024-01-03    DISTRIBUTION LIST:  Williams No

## 2024-02-07 NOTE — THERAPY
Patient reports 10/10 abdominal pain for 1-2 months. Reports pain is 10/10 and feels cramping, and that recently the pain has moved to the urethra. Denies hematuria, back/abdominal/SP pain, or fever. They have tried ice and naproxen, which lowered the pain to a 5-6. Reports a UA/UC at their PCP recently that was clear. They would like to know how to proceed. Also reports vaginal atrophy- due for visit. ER precautions reviewed.      Reviewed w/Wayne- patient instructed to use vaginal lubricant and Azo OTC for discomfort/burning. Patient additionally relayed that they had intercourse recently, and that the newer/worse pain started after.     Messaged [facility] to see if we could double-book tomorrow- notified pt. They will come for 9:00.    ----- Message from Man Patricia sent at 2/7/2024  3:39 PM CST -----      Name of Who is Calling: ANJANA LOPEZ [[id number]]      What is the request in detail: Medicaid Pt called said she is having bladder pain and would like to know what to do or could she be seen immediately.Please contact to further discuss and advise.          Can the clinic reply by [facility]: Y      What Number to Call Back if not in [facility]: [phone]                                         Speech Language Pathology  Daily Treatment     Patient Name: Eriberto Young  Age:  66 y.o., Sex:  male  Medical Record #: [id number]  Today's Date: 7/9/2021     Precautions  Precautions: (P) Fall Risk, Swallow Precautions ( See Comments)  Comments: (P) SEIZURES    Assessment    Pt moved up in bed with 1-1 sitter. Pt with immature type of speech and confused. Speech was 100% intelligible. Pt with impulsiveness with self feeding so SLP feeding pt to better meet approp bite size. No coughing post single sips of NTL from the cup. Slight to no oral residue with chopped pancake. Approx 15 minutes into tx, pt with jerking type movement of his RUE. This jerking/seizure type movement then progressed to pt's upper body and head. Sitter called RN and RN into see pt. Pt with limited to no-responsiveness and variability in jerking type movement. MD into see pt. Wrist restraints re-applied. SLP collaborated with RN regarding pt with limited intake the last 3 days. Probable less than 2 meals. Consider NG as approp. Hold intake currently r/t post-ictal.    Plan  SEE ON Saturday-LEVEL 4 PRIORITY. ? NG, ? Restart SB6/MT2  Continue current treatment plan.    Discharge Recommendations: (P) Recommend post-acute placement for additional speech therapy services prior to discharge home       07/09/21 1115   Speech / Dysarthria   Comments intelligible   Voice   Comments WNL   Dysphagia    Dysphagia X   Positioning / Behavior Modification Other (see Comments);Modulate Rate or Bite Size;Multiple Swallows  (1-1 feeding, feed as needed, 90 degrees HOB)   Diet / Liquid Recommendation Soft & Bite-Sized (6) - (Dysphagia III);Mildly Thick (2) - (Nectar Thick)   Nutritional Liquid Intake Rating Scale Thickened beverages (mildly thick unless otherwise specified)   Nutritional Food Intake Rating Scale Total oral diet of a single consistency   Nursing Communication Swallow Precaution Sign Posted at Head of Bed   Skilled Intervention Compensatory  "Strategies;Verbal Cueing   Recommended Route of Medication Administration   Medication Administration  Crush all Medications in Puree   Patient / Family Goals   Patient / Family Goal #1 \" Can I have water?\"   Goal #1 Outcome Progressing slower than expected   Short Term Goals   Short Term Goal # 1 The patient will utilize swallow strategies during SB6/MT2 meal items with no overt s/s of aspiration given min A   Goal Outcome # 1 Progressing slower than expected   Session Information   Priority 3  (5x,h/oTBI/seizures, SB6/MT2-seizureduring [email or web address] SAT)       Addendum-PLEASE NOTE THAT PRIORITY IS A 4  "

## 2024-02-15 DIAGNOSIS — G43.719 INTRACTABLE CHRONIC MIGRAINE WITHOUT AURA AND WITHOUT STATUS MIGRAINOSUS: ICD-10-CM

## 2024-02-16 RX ORDER — KETOROLAC TROMETHAMINE 10 MG/1
10 TABLET, FILM COATED ORAL EVERY 4 HOURS PRN
Qty: 10 TABLET | Refills: 5 | Status: SHIPPED | OUTPATIENT
Start: 2024-02-16

## 2024-02-16 NOTE — TELEPHONE ENCOUNTER
Received request via: Pharmacy    Medication Name/Dosage     ketorolac (TORADOL) 10 MG Tab       When was medication last prescribed 05/17/23    How many refills were previously provided 5    How many Refills does he patient have left from last prescription 0    Was the patient seen in the last year in this department? Yes   Date of last office visit 11/08/23     Per last Neurology Office Visit, when was the date of next follow up visit set for?                            Date of office visit follow up request 6 months      Does the patient have an upcoming appointment? Yes   If yes, when 05/13/24             If no, schedule appointment N/A     Does the patient have [facility] and need 100 day supply (blood pressure, diabetes and cholesterol meds only)? Patient does not have SCP

## 2024-03-10 DIAGNOSIS — F02.80 DEMENTIA ASSOCIATED WITH OTHER UNDERLYING DISEASE WITHOUT BEHAVIORAL DISTURBANCE (HCC): ICD-10-CM

## 2024-03-13 RX ORDER — MEMANTINE HYDROCHLORIDE 28 MG/1
28 CAPSULE, EXTENDED RELEASE ORAL
Qty: 90 CAPSULE | Refills: 0 | Status: SHIPPED | OUTPATIENT
Start: 2024-03-13

## 2024-03-13 NOTE — TELEPHONE ENCOUNTER
Received request via: Pharmacy    Medication Name/Dosage Memantine HCl ER (NAMENDA) 28 MG CAPSULE SR 24 HR     When was medication last prescribed 04/10/23    How many refills were previously provided 3    How many Refills does he patient have left from last prescription 0    Was the patient seen in the last year in this department? Yes   Date of last office visit 11/08/23     Per last Neurology Office Visit, when was the date of next follow up visit set for?                            Date of office visit follow up request 6 months      Does the patient have an upcoming appointment? Yes   If yes, when 05/13/24             If no, schedule appointment N/A     Does the patient have [facility] Plus and need 100 day supply (blood pressure, diabetes and cholesterol meds only)? Patient does not have SCP

## 2024-04-04 DIAGNOSIS — G43.719 INTRACTABLE CHRONIC MIGRAINE WITHOUT AURA AND WITHOUT STATUS MIGRAINOSUS: ICD-10-CM

## 2024-04-05 NOTE — TELEPHONE ENCOUNTER
Received request via: Pharmacy    Medication Name/Dosage Topiramate 25 MG Oral Capsule Sprinkle (Topamax)     When was medication last prescribed 05/08/23    How many refills were previously provided 3    How many Refills does he patient have left from last prescription 0    Was the patient seen in the last year in this department? Yes   Date of last office visit 11/08/23     Per last Neurology Office Visit, when was the date of next follow up visit set for?                            Date of office visit follow up request 6 months      Does the patient have an upcoming appointment? Yes   If yes, when 05/13/24             If no, schedule appointment N/A     Does the patient have [facility] Plus and need 100 day supply (blood pressure, diabetes and cholesterol meds only)? Patient does not have SCP

## 2024-04-09 RX ORDER — TOPIRAMATE SPINKLE 25 MG/1
25 CAPSULE ORAL 2 TIMES DAILY
Qty: 180 CAPSULE | Refills: 3 | Status: SHIPPED | OUTPATIENT
Start: 2024-04-09 | End: 2025-04-04

## 2024-04-10 ENCOUNTER — TELEPHONE (OUTPATIENT)
Dept: NEUROLOGY | Facility: MEDICAL CENTER | Age: 69
End: 2024-04-10
Payer: COMMERCIAL

## 2024-04-10 NOTE — TELEPHONE ENCOUNTER
Topiramate (Topamax) 25 MG Caps Sprinkle    RTS -- Rx: [id number] DT: 10-MAR-2024 DS: 9 0 RD: 17-MAY-2024 Prev Pharm Ph: [phone];If this is a dosage change, submit  [facility] 05  + - 04/10/2024 8:30am [facility]

## 2024-05-13 ENCOUNTER — TELEPHONE (OUTPATIENT)
Dept: NEUROLOGY | Facility: MEDICAL CENTER | Age: 69
End: 2024-05-13
Payer: COMMERCIAL

## 2024-05-13 ENCOUNTER — OFFICE VISIT (OUTPATIENT)
Dept: NEUROLOGY | Facility: MEDICAL CENTER | Age: 69
End: 2024-05-13
Attending: PSYCHIATRY & NEUROLOGY
Payer: COMMERCIAL

## 2024-05-13 VITALS
BODY MASS INDEX: 31.14 KG/M2 | OXYGEN SATURATION: 94 % | HEART RATE: 75 BPM | TEMPERATURE: 96.8 F | WEIGHT: 193.78 LBS | SYSTOLIC BLOOD PRESSURE: 110 MMHG | DIASTOLIC BLOOD PRESSURE: 62 MMHG | HEIGHT: 66 IN

## 2024-05-13 DIAGNOSIS — G43.719 INTRACTABLE CHRONIC MIGRAINE WITHOUT AURA AND WITHOUT STATUS MIGRAINOSUS: ICD-10-CM

## 2024-05-13 DIAGNOSIS — G43.019 INTRACTABLE MIGRAINE WITHOUT AURA AND WITHOUT STATUS MIGRAINOSUS: ICD-10-CM

## 2024-05-13 DIAGNOSIS — F02.80 DEMENTIA ASSOCIATED WITH OTHER UNDERLYING DISEASE WITHOUT BEHAVIORAL DISTURBANCE (HCC): ICD-10-CM

## 2024-05-13 DIAGNOSIS — G25.1 TREMOR DUE TO MULTIPLE DRUGS: ICD-10-CM

## 2024-05-13 PROCEDURE — 3078F DIAST BP <80 MM HG: CPT | Performed by: PSYCHIATRY & NEUROLOGY

## 2024-05-13 PROCEDURE — 99214 OFFICE O/P EST MOD 30 MIN: CPT | Performed by: PSYCHIATRY & NEUROLOGY

## 2024-05-13 PROCEDURE — 3074F SYST BP LT 130 MM HG: CPT | Performed by: PSYCHIATRY & NEUROLOGY

## 2024-05-13 RX ORDER — MEMANTINE HYDROCHLORIDE 28 MG/1
28 CAPSULE, EXTENDED RELEASE ORAL
Qty: 90 CAPSULE | Refills: 3 | Status: SHIPPED | OUTPATIENT
Start: 2024-05-13 | End: 2025-05-08

## 2024-05-13 RX ORDER — PROPRANOLOL HCL 60 MG
60 CAPSULE, EXTENDED RELEASE 24HR ORAL DAILY
Qty: 90 CAPSULE | Refills: 3 | Status: SHIPPED | OUTPATIENT
Start: 2024-05-13

## 2024-05-13 RX ORDER — TOPIRAMATE SPINKLE 25 MG/1
25 CAPSULE ORAL 2 TIMES DAILY
Qty: 180 CAPSULE | Refills: 3 | Status: SHIPPED | OUTPATIENT
Start: 2024-05-13 | End: 2025-05-08

## 2024-05-13 RX ORDER — MIRABEGRON 25 MG/1
25 TABLET, FILM COATED, EXTENDED RELEASE ORAL DAILY
COMMUNITY
Start: 2024-05-10

## 2024-05-13 ASSESSMENT — PATIENT HEALTH QUESTIONNAIRE - PHQ9: CLINICAL INTERPRETATION OF PHQ2 SCORE: 0

## 2024-05-13 NOTE — TELEPHONE ENCOUNTER
Received Refill PA request via [facility]  for topiramate (TOPAMAX) 25 MG capsule . (Quantity:180, Day Supply:90)     Insurance: [employer]  Member ID:  [id number]  BIN: [id number]  PCN: [id number]  Group: [id number]     Ran Test claim via [place] & medication  REFILL TOO SOON UNTIL 05/17/2024. LAST FILL DATE 03/10/2024

## 2024-05-14 NOTE — ASSESSMENT & PLAN NOTE
Has had some increase in short-term memory problems for his wife since I last saw him patient states he does sometimes notice his memory is not as good as he used to be.  He does attribute some of this to age.  Patient has no family history of dementia.

## 2024-05-14 NOTE — PROGRESS NOTES
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  Patient is doing well with his medications but does need some refills.  Patient also sees pain medicine specialist for treatment of his headaches.         Relevant Medications    topiramate (TOPAMAX) 25 MG capsule    propranolol LA (INDERAL LA) 60 MG CAPSULE SR 24 HR    Dementia associated with other underlying disease without behavioral disturbance (HCC)     Has had some increase in short-term memory problems for his wife since I last saw him patient states he does sometimes notice his memory is not as good as he used to be.  He does attribute some of this to age.  Patient has no family history of dementia.         Relevant Medications    Memantine HCl ER (NAMENDA) 28 MG CAPSULE SR 24 HR    topiramate (TOPAMAX) 25 MG capsule     Other Visit Diagnoses       Intractable migraine without aura and without status migrainosus        Relevant Medications    topiramate (TOPAMAX) 25 MG capsule    propranolol LA (INDERAL LA) 60 MG CAPSULE SR 24 HR            History of present illness:  Eriberto Young 68 y.o. male presents today for treatment of multiple neurologic issues which are largely related to fall causing head trauma.    Past medical history:   Past Medical History:   Diagnosis Date    Anxiety     Chronic pain     Complex partial epilepsy, intractable ([facility])     Depression     Diabetes ([facility])     Head injuries     Hydrocephalus ([facility])     w/ shunt    Post concussion syndrome 11/10/2014    Self-injurious behavior     Suicide attempt ([facility])        Past surgical history:   Past Surgical History:   Procedure Laterality Date    ABDOMINAL EXPLORATION      ACHILLES TENDON REPAIR       CRANIOTOMY      HERNIA REP HIATAL      times 2    HERNIA REPAIR      LAMINOTOMY         Family history:   Family History   Problem Relation Age of Onset    Alcohol abuse Father     ADD / ADHD Maternal Grandfather     ADD / ADHD Maternal Grandmother     Dementia Maternal Grandmother        Social history:   Social History     Socioeconomic History    Marital status:      Spouse name: Not on file    Number of children: Not on file    Years of education: Not on file    Highest education level: Not on file   Occupational History    Not on file   Tobacco Use    Smoking status: Never    Smokeless tobacco: Never   Vaping Use    Vaping Use: Never used   Substance and Sexual Activity    Alcohol use: Yes     Alcohol/week: 1.2 oz     Types: 2 Glasses of wine per week     Comment: rarely    Drug use: No    Sexual activity: Not on file   Other Topics Concern    Not on file   Social History Narrative    Not on file     Social Determinants of Health     Financial Resource Strain: Not on file   Food Insecurity: Not on file   Transportation Needs: Not on file   Physical Activity: Not on file   Stress: Not on file   Social Connections: Not on file   Intimate Partner Violence: Not on file   Housing Stability: Not on file       Current medications:   Current Outpatient Medications   Medication    mirabegron ER (MYRBETRIQ) 25 MG TABLET SR 24 HR    Memantine HCl ER (NAMENDA) 28 MG CAPSULE SR 24 HR    topiramate (TOPAMAX) 25 MG capsule    propranolol LA (INDERAL LA) 60 MG CAPSULE SR 24 HR    ketorolac (TORADOL) 10 MG Tab    glipiZIDE ER (GLUCOTROL XL) 5 MG TABLET SR 24 HR    famotidine (PEPCID) 20 MG Tab    zolpidem (AMBIEN) 5 MG Tab    ziprasidone (GEODON) 20 MG Cap    Rimegepant Sulfate (NURTEC) 75 MG TABLET DISPERSIBLE    MOUNJARO 2.5 MG/0.5ML Solution Pen-injector    GEMTESA 75 MG Tab    atorvastatin (LIPITOR) 10 MG Tab    metformin (GLUCOPHAGE) 1000 MG tablet    Zoster Vac Recomb Adjuvanted (SHINGRIX) 50 MCG/0.5ML Recon Susp     Influenza Vac Subunit Quad (FLUCELVAX QUADRIVALENT) 0.5 ML Suspension Prefilled Syringe injection    loperamide (IMODIUM) 2 MG Cap    DULoxetine (CYMBALTA) 60 MG Cap DR Particles delayed-release capsule    therapeutic multivitamin-minerals (THERAGRAN-M) Tab    omega-3 acid ethyl esters (LOVAZA) 1 GM capsule    traZODone (DESYREL) 50 MG Tab    EMGALITY 120 MG/ML Solution Auto-injector    Ibuprofen 200 MG Cap    BOTOX 200 UNITS Recon Soln    pantoprazole (PROTONIX) 40 MG Tablet Delayed Response    hydrocodone-acetaminophen (NORCO) 5-325 MG TABS per tablet    M-VIT PO     No current facility-administered medications for this visit.       Medication Allergy:  Allergies   Allergen Reactions    Ativan Unspecified     Okay with small amount    Hydromorphone Unspecified     Okay with small amount    Ativan      High doses    Hydromorphone Hcl     Nkda [No Known Drug Allergy]        Review of systems:   Constitutional: denies fever, night sweats, weight loss.   Eyes: denies acute vision change, eye pain or secretion.   Ears, Nose, Mouth, Throat: denies nasal secretion, nasal bleeding, difficulty swallowing, hearing loss, tinnitus, vertigo, ear pain, acute dental problems, oral ulcers or lesions.   Endocrine: denies recent weight changes, heat or cold intolerance, polyuria, polydypsia, polyphagia,abnormal hair growth.  Cardiovascular: denies new onset of chest pain, palpitations, syncope, or dyspnea of exertion.  Pulmonary: denies shortness of breath, new onset of cough, hemoptysis, wheezing, chest pain or flu-like symptoms.   GI: denies nausea, vomiting, diarrhea, GI bleeding, change in appetite, abdominal pain, and change in bowel habits.  : denies dysuria, urinary incontinence, hematuria.  Heme/oncology: denies history of easy bruising or bleeding. No history of cancer, DVTor PE.  Allergy/immunology: denies hives/urticaria, or itching.   Dermatologic: denies new rash, or new skin lesions.  Musculoskeletal:denies joint  "swelling or pain, muscle pain, neck and back pain. Neurologic: denies headaches, acute visual changes, facial droopiness, muscle weakness (focal or generalized), paresthesias, anesthesia, ataxia, change in speech or language, memory loss, abnormal movements, seizures, loss of consciousness, or episodes of confusion.   Psychiatric: denies symptoms of depression, anxiety, hallucinations, mood swings or changes, suicidal or homicidal thoughts.     Physical examination:   Vitals:    05/13/24 1247   BP: 110/62   BP Location: Left arm   Patient Position: Sitting   BP Cuff Size: Adult   Pulse: 75   Temp: 36 °C (96.8 °F)   TempSrc: Temporal   SpO2: 94%   Weight: 87.9 kg (193 lb 12.6 oz)   Height: 1.676 m (5' 5.98\")     General: Patient in no acute distress, pleasant and cooperative.  HEENT: Normocephalic, no signs of acute trauma.   Neck: supple, no meningeal signs or carotid bruits. There is normal range of motion. No tenderness on exam.   Chest: clear to auscultation. No cough.   CV: RRR, no murmurs.   Skin: no signs of acute rashes or trauma.   Musculoskeletal: joints exhibit full range of motion, without any pain to palpation. There are no signs of joint or muscle swelling. There is no tenderness to deep palpation of muscles.   Psychiatric: No hallucinatory behavior. Denies symptoms of depression or suicidal ideation. Mood and affect appear normal on exam.     NEUROLOGICAL EXAM:   Mental status, orientation: Awake, alert and fully oriented.   Speech and language: speech is clear and fluent. The patient is able to name, repeat and comprehend.   Memory: There is intact recollection of recent and remote events.   Cranial nerve exam: Pupils are 3-4 mm bilaterally and equally reactive to light and accommodation. Visual fields are intact by confrontation. Fundoscopic exam was unremarkable. There is no nystagmus on primary or secondary gaze. Intact full EOM in all directions of gaze. Face appears symmetric. Sensation in the " face is intact to light touch. Uvula is midline. Palate elevates symmetrically. Tongue is midline and without any signs of tongue biting or fasciculations. Sternocleidomastoid muscles exhibit is normal strength bilaterally. Shoulder shrug is intact bilaterally.   Motor exam: Strength is 5/5 in all extremities.  Patient has some tremor.  Sensory exam reveals normal sense of light touch, proprioception, vibration and pinprick in all extremities.   Deep tendon reflexes:  2+ throughout. Plantar responses are flexor. There is no clonus.   Coordination: shows a normal finger-nose-finger. Normal rapidly alternating movements.   Gait: The patient was able to get up from seated position on first attempt without requiring assistance. Found to be steady when walking. Movements were fluid with normal arm swing. The patient was able to turn without difficulties or tendency to fall. Romberg examination positive      ANCILLARY DATA REVIEWED:     Lab Data Review:  No results found for this or any previous visit (from the past 24 hour(s)).    Records reviewed: I reviewed laboratory testing.      Imaging: I reviewed last neuroimaging.          ASSESSMENT AND PLAN:    1. Dementia associated with other underlying disease without behavioral disturbance (HCC)  We discussed memory techniques and he does do better in the summer months when he is more active.  Patient spends a lot of his summer a great [place].  - Memantine HCl ER (NAMENDA) 28 MG CAPSULE SR 24 HR; Take 1 Capsule by mouth every day for 360 days.  Dispense: 90 Capsule; Refill: 3    2. Intractable chronic migraine without aura and without status migrainosus  I will refill his topiramate for his headaches  - topiramate (TOPAMAX) 25 MG capsule; Take 1 Capsule by mouth 2 times a day for 360 days.  Dispense: 180 Capsule; Refill: 3    3. Intractable migraine without aura and without status migrainosus  I will refill his propranolol for his headaches  - propranolol LA (INDERAL LA) 60  MG CAPSULE SR 24 HR; Take 1 Capsule by mouth every day.  Dispense: 90 Capsule; Refill: 3    4. 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  We discussed the importance of continued exercise and stretching.  - propranolol LA (INDERAL LA) 60 MG CAPSULE SR 24 HR; Take 1 Capsule by mouth every day.  Dispense: 90 Capsule; Refill: 3        FOLLOW-UP:   Return in about 6 months (around 11/13/2024).        EDUCATION AND COUNSELING:  -Discussed regular exercise program and prevention of cardiovascular disease, including stroke.   -Discussed healthy lifestyle, including: healthy diet (rich in fruits, vegetables, nuts and healthy oils); proper hydration, and adequate sleep hygiene (allowing 7-8 hrs of overnight sleep).        Melissa Bloch, MD  Clinical  of Neurology [facility].   Diplomate in Neurology.   Office: [phone]  Fax: [phone]

## 2024-05-14 NOTE — ASSESSMENT & PLAN NOTE
Patient has a shunt in which appears to be functioning okay.  Patient is doing well with his medications but does need some refills.  Patient also sees pain medicine specialist for treatment of his headaches.

## 2024-08-26 DIAGNOSIS — G43.719 INTRACTABLE CHRONIC MIGRAINE WITHOUT AURA AND WITHOUT STATUS MIGRAINOSUS: ICD-10-CM

## 2024-08-26 RX ORDER — RIMEGEPANT SULFATE 75 MG/75MG
75 TABLET, ORALLY DISINTEGRATING ORAL
Qty: 30 TABLET | Refills: 1 | Status: SHIPPED | OUTPATIENT
Start: 2024-08-26 | End: 2025-02-22

## 2024-08-28 ENCOUNTER — TELEPHONE (OUTPATIENT)
Dept: NEUROLOGY | Facility: MEDICAL CENTER | Age: 69
End: 2024-08-28
Payer: COMMERCIAL

## 2024-08-28 NOTE — TELEPHONE ENCOUNTER
Received [employer] PA request via [facility]  for Nurtec. (Quantity:8, Day Supply:30)       Ran Test claim via [place] & medication  This med is non-formulary and is excluded on patients plan. They will cover Ubrelvy with a PA. Will you consider changing it to that ?    Thanks,    Pauline  Rx Coordinator

## 2024-09-03 DIAGNOSIS — G43.719 INTRACTABLE CHRONIC MIGRAINE WITHOUT AURA AND WITHOUT STATUS MIGRAINOSUS: ICD-10-CM

## 2024-09-03 RX ORDER — UBROGEPANT 100 MG/1
100 TABLET ORAL DAILY
Qty: 10 TABLET | Refills: 0 | Status: SHIPPED | OUTPATIENT
Start: 2024-09-03 | End: 2024-10-09

## 2024-09-04 ENCOUNTER — TELEPHONE (OUTPATIENT)
Dept: NEUROLOGY | Facility: MEDICAL CENTER | Age: 69
End: 2024-09-04
Payer: COMMERCIAL

## 2024-09-04 PROCEDURE — RXMED WILLOW AMBULATORY MEDICATION CHARGE: Performed by: PSYCHIATRY & NEUROLOGY

## 2024-09-04 NOTE — TELEPHONE ENCOUNTER
Received [employer] Start PA request via [facility]  for Ubrelvy. (Quantity:10, Day Supply:30)     Insurance: [employer]  Member ID:  [id number]  BIN: [id number]  PCN: [id number]  Group: [id number]     Ran Test claim via [place] & medication Rejects stating prior authorization is required.

## 2024-09-04 NOTE — TELEPHONE ENCOUNTER
Prior Authorization for Ubrelvy has been approved for a quantity of 10 , day supply 30    Prior Authorization reference number: [id number]  Insurance: [employer]  Effective dates: 09/04/24-12/30/2222  Copay: $0     Is patient eligible to fill with [facility]? Yes    Next Steps: The Patients copay is less than $5.00. Will contact the patient to determine choice of pharmacy, if applicable.

## 2024-09-04 NOTE — TELEPHONE ENCOUNTER
Prior Authorization for Ubrlevy (Quantity: 10, Days: 30) has been submitted via Cover My Meds: Key ([id number])    Insurance: [employer]y    Will follow up in 24-48 business hours.

## 2024-09-09 ENCOUNTER — PHARMACY VISIT (OUTPATIENT)
Dept: PHARMACY | Facility: MEDICAL CENTER | Age: 69
End: 2024-09-09
Payer: COMMERCIAL

## 2024-10-08 DIAGNOSIS — G43.719 INTRACTABLE CHRONIC MIGRAINE WITHOUT AURA AND WITHOUT STATUS MIGRAINOSUS: ICD-10-CM

## 2024-10-10 RX ORDER — UBROGEPANT 100 MG/1
100 TABLET ORAL DAILY
Qty: 10 TABLET | Refills: 4 | Status: SHIPPED | OUTPATIENT
Start: 2024-10-10 | End: 2024-11-23

## 2024-10-21 PROCEDURE — RXMED WILLOW AMBULATORY MEDICATION CHARGE: Performed by: PSYCHIATRY & NEUROLOGY

## 2024-10-24 ENCOUNTER — PHARMACY VISIT (OUTPATIENT)
Dept: PHARMACY | Facility: MEDICAL CENTER | Age: 69
End: 2024-10-24
Payer: COMMERCIAL

## 2024-11-18 ENCOUNTER — OFFICE VISIT (OUTPATIENT)
Dept: NEUROLOGY | Facility: MEDICAL CENTER | Age: 69
End: 2024-11-18
Attending: PSYCHIATRY & NEUROLOGY
Payer: COMMERCIAL

## 2024-11-18 VITALS
SYSTOLIC BLOOD PRESSURE: 110 MMHG | HEIGHT: 66 IN | DIASTOLIC BLOOD PRESSURE: 68 MMHG | HEART RATE: 76 BPM | OXYGEN SATURATION: 98 % | TEMPERATURE: 97.1 F | BODY MASS INDEX: 29.44 KG/M2 | WEIGHT: 183.2 LBS

## 2024-11-18 DIAGNOSIS — G43.719 INTRACTABLE CHRONIC MIGRAINE WITHOUT AURA AND WITHOUT STATUS MIGRAINOSUS: ICD-10-CM

## 2024-11-18 DIAGNOSIS — G47.33 OBSTRUCTIVE SLEEP APNEA: ICD-10-CM

## 2024-11-18 DIAGNOSIS — F02.80 DEMENTIA ASSOCIATED WITH OTHER UNDERLYING DISEASE WITHOUT BEHAVIORAL DISTURBANCE (HCC): ICD-10-CM

## 2024-11-18 DIAGNOSIS — G25.1 TREMOR DUE TO MULTIPLE DRUGS: ICD-10-CM

## 2024-11-18 DIAGNOSIS — F33.41 RECURRENT MAJOR DEPRESSIVE DISORDER, IN PARTIAL REMISSION (HCC): ICD-10-CM

## 2024-11-18 PROBLEM — F32.9 MAJOR DEPRESSIVE DISORDER: Status: ACTIVE | Noted: 2024-11-18

## 2024-11-18 PROCEDURE — 99215 OFFICE O/P EST HI 40 MIN: CPT | Performed by: PSYCHIATRY & NEUROLOGY

## 2024-11-18 PROCEDURE — 99212 OFFICE O/P EST SF 10 MIN: CPT | Performed by: PSYCHIATRY & NEUROLOGY

## 2024-11-18 PROCEDURE — 3078F DIAST BP <80 MM HG: CPT | Performed by: PSYCHIATRY & NEUROLOGY

## 2024-11-18 PROCEDURE — 99215 OFFICE O/P EST HI 40 MIN: CPT

## 2024-11-18 PROCEDURE — 3074F SYST BP LT 130 MM HG: CPT | Performed by: PSYCHIATRY & NEUROLOGY

## 2024-11-18 RX ORDER — KETOROLAC TROMETHAMINE 10 MG/1
10 TABLET, FILM COATED ORAL EVERY 4 HOURS PRN
Qty: 10 TABLET | Refills: 5 | Status: SHIPPED | OUTPATIENT
Start: 2024-11-18

## 2024-11-18 ASSESSMENT — PATIENT HEALTH QUESTIONNAIRE - PHQ9
SUM OF ALL RESPONSES TO PHQ QUESTIONS 1-9: 11
CLINICAL INTERPRETATION OF PHQ2 SCORE: 2
5. POOR APPETITE OR OVEREATING: 1 - SEVERAL DAYS

## 2024-11-18 NOTE — PROGRESS NOTES
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 It is not bothering his eating. Pt states that he takes the propranolol at night. Pt states that he thinks it helps. His grandma had tremor.         Headache, chronic migraine without aura, intractable     Pt states he has been doing well except when the weather comes through. Pt states that the Ubrelvy helps but it is not as good as nurtec but it still works. It does make him tired.         Relevant Medications    ketorolac (TORADOL) 10 MG Tab    Dementia associated with other underlying disease without behavioral disturbance (HCC)     Pt states that he had family history in his grandma and his father also had some issues. Pt states that he has a big family of 6 kids. Pt states that he does not have much family history there.         Obstructive sleep apnea     Pt states he could not tolerate CPAP and if he sits up with an adjustable bed that works well. Pt states that it would always get ripped off his face.Pt states that he does not use it anymore.         Major depressive disorder       History of present illness:  Eriberto Young 69 y.o. male presents today for multi-factoral neurologic issues from a head trauma.  Patient thinks his drug-related tremor is getting slightly worse in his right over his left hand.  His wife thinks his memory disorder is getting slightly worse.    Past medical history:   Past Medical History:   Diagnosis Date    Anxiety     Chronic pain     Complex partial epilepsy, intractable (HCC)     Depression     Diabetes (HCC)     Head injuries     Hydrocephalus (HCC)     w/ shunt    Post concussion syndrome 11/10/2014    Self-injurious behavior     Suicide attempt ([facility])        Past surgical history:   Past Surgical History:    Procedure Laterality Date    ABDOMINAL EXPLORATION      ACHILLES TENDON REPAIR      CRANIOTOMY      HERNIA REP HIATAL      times 2    HERNIA REPAIR      LAMINOTOMY         Family history:   Family History   Problem Relation Age of Onset    Alcohol abuse Father     ADD / ADHD Maternal Grandfather     ADD / ADHD Maternal Grandmother     Dementia Maternal Grandmother        Social history:   Social History     Socioeconomic History    Marital status:      Spouse name: Not on file    Number of children: Not on file    Years of education: Not on file    Highest education level: Not on file   Occupational History    Not on file   Tobacco Use    Smoking status: Never    Smokeless tobacco: Never   Vaping Use    Vaping status: Never Used   Substance and Sexual Activity    Alcohol use: Not Currently     Alcohol/week: 1.2 oz     Types: 2 Glasses of wine per week     Comment: rarely    Drug use: No    Sexual activity: Not on file   Other Topics Concern    Not on file   Social History Narrative    Not on file     Social Drivers of Health     Financial Resource Strain: Not on file   Food Insecurity: Not on file   Transportation Needs: Not on file   Physical Activity: Not on file   Stress: Not on file   Social Connections: Not on file   Intimate Partner Violence: Not on file   Housing Stability: Not on file       Current medications:   Current Outpatient Medications   Medication    ketorolac (TORADOL) 10 MG Tab    Ubrogepant (UBRELVY) 100 MG Tab    Memantine HCl ER (NAMENDA) 28 MG CAPSULE SR 24 HR    topiramate (TOPAMAX) 25 MG capsule    propranolol LA (INDERAL LA) 60 MG CAPSULE SR 24 HR    glipiZIDE ER (GLUCOTROL XL) 5 MG TABLET SR 24 HR    famotidine (PEPCID) 20 MG Tab    ziprasidone (GEODON) 20 MG Cap    MOUNJARO 2.5 MG/0.5ML Solution Pen-injector    GEMTESA 75 MG Tab    atorvastatin (LIPITOR) 10 MG Tab    metformin (GLUCOPHAGE) 1000 MG tablet    Zoster Vac Recomb Adjuvanted (SHINGRIX) 50 MCG/0.5ML Recon Susp     Influenza Vac Subunit Quad (FLUCELVAX QUADRIVALENT) 0.5 ML Suspension Prefilled Syringe injection    DULoxetine (CYMBALTA) 60 MG Cap DR Particles delayed-release capsule    therapeutic multivitamin-minerals (THERAGRAN-M) Tab    omega-3 acid ethyl esters (LOVAZA) 1 GM capsule    traZODone (DESYREL) 50 MG Tab    EMGALITY 120 MG/ML Solution Auto-injector    Ibuprofen 200 MG Cap    BOTOX 200 UNITS Recon Soln    pantoprazole (PROTONIX) 40 MG Tablet Delayed Response    hydrocodone-acetaminophen (NORCO) 5-325 MG TABS per tablet    M-VIT PO    Rimegepant Sulfate (NURTEC) 75 MG TABLET DISPERSIBLE    mirabegron ER (MYRBETRIQ) 25 MG TABLET SR 24 HR    zolpidem (AMBIEN) 5 MG Tab    loperamide (IMODIUM) 2 MG Cap     No current facility-administered medications for this visit.       Medication Allergy:  Allergies   Allergen Reactions    Ativan Unspecified     Okay with small amount    Hydromorphone Unspecified     Okay with small amount    Ativan      High doses    Hydromorphone Hcl     Nkda [No Known Drug Allergy]        Review of systems:   Constitutional: denies fever, night sweats, weight loss.   Eyes: denies acute vision change, eye pain or secretion.   Ears, Nose, Mouth, Throat: denies nasal secretion, nasal bleeding, difficulty swallowing, hearing loss, tinnitus, vertigo, ear pain, acute dental problems, oral ulcers or lesions.   Endocrine: denies recent weight changes, heat or cold intolerance, polyuria, polydypsia, polyphagia,abnormal hair growth.  Cardiovascular: denies new onset of chest pain, palpitations, syncope, or dyspnea of exertion.  Pulmonary: denies shortness of breath, new onset of cough, hemoptysis, wheezing, chest pain or flu-like symptoms.   GI: denies nausea, vomiting, diarrhea, GI bleeding, change in appetite, abdominal pain, and change in bowel habits.  : denies dysuria, urinary incontinence, hematuria.  Heme/oncology: denies history of easy bruising or bleeding. No history of cancer, DVTor  "PE.  Allergy/immunology: denies hives/urticaria, or itching.   Dermatologic: denies new rash, or new skin lesions.  Musculoskeletal:denies joint swelling or pain, muscle pain, neck and back pain. Neurologic: denies headaches, acute visual changes, facial droopiness, muscle weakness (focal or generalized), paresthesias, anesthesia, ataxia, change in speech or language, memory loss, abnormal movements, seizures, loss of consciousness, or episodes of confusion.   Psychiatric: denies symptoms of depression, anxiety, hallucinations, mood swings or changes, suicidal or homicidal thoughts.     Physical examination:   Vitals:    11/18/24 1258   BP: 110/68   BP Location: Left arm   Patient Position: Sitting   BP Cuff Size: Adult   Pulse: 76   Temp: 36.2 °C (97.1 °F)   TempSrc: Temporal   SpO2: 98%   Weight: 83.1 kg (183 lb 3.2 oz)   Height: 1.676 m (5' 6\")     General: Patient in no acute distress, pleasant and cooperative.  HEENT: Normocephalic, no signs of acute trauma.   Neck: supple, no meningeal signs or carotid bruits. There is normal range of motion. No tenderness on exam.   Chest: clear to auscultation. No cough.   CV: RRR, no murmurs.   Skin: no signs of acute rashes or trauma.   Musculoskeletal: joints exhibit full range of motion, without any pain to palpation. There are no signs of joint or muscle swelling. There is no tenderness to deep palpation of muscles.   Psychiatric: No hallucinatory behavior. Denies symptoms of depression or suicidal ideation. Mood and affect appear normal on exam.     NEUROLOGICAL EXAM:   Mental status, orientation: Awake, alert and fully oriented.   Speech and language: speech is clear and fluent. The patient is able to name, repeat and comprehend.   Memory: There is intact recollection of recent and remote events.   Cranial nerve exam: Pupils are 3-4 mm bilaterally and equally reactive to light and accommodation. Visual fields are intact by confrontation. Fundoscopic exam was " unremarkable. There is no nystagmus on primary or secondary gaze. Intact full EOM in all directions of gaze. Face appears symmetric. Sensation in the face is intact to light touch. Uvula is midline. Palate elevates symmetrically. Tongue is midline and without any signs of tongue biting or fasciculations. Sternocleidomastoid muscles exhibit is normal strength bilaterally. Shoulder shrug is intact bilaterally.   Motor exam: Strength is 5/5 in all extremities. Tone is normal. No abnormal movements were seen on exam.   Sensory exam reveals normal sense of light touch, proprioception, vibration and pinprick in all extremities.   Deep tendon reflexes:  2+ throughout. Plantar responses are flexor. There is no clonus.   Coordination: shows a normal finger-nose-finger. Normal rapidly alternating movements.   Gait: The patient was able to get up from seated position on first attempt without requiring assistance. Found to be unsteady when walking. Movements were not fluid with normal arm swing. The patient was able to turn with difficulties or tendency to fall. Romberg examination positive      ANCILLARY DATA REVIEWED:     Lab Data Review:  No results found for this or any previous visit (from the past 24 hours).    Records reviewed: Outside urology and gastroenterology records reviewed      Imaging: No neuroimaging since last visit.          ASSESSMENT AND PLAN:    1. Recurrent major depressive disorder, in partial remission (HCC)  Patient states he feels like the medications he is getting are keeping his depression under control.  He has relative infrequent blue days.  Patient is not currently actively suicidal and does not have a plan.    2. Intractable chronic migraine without aura and without status migrainosus  Plan to continue with topiramate to treat both his chronic migraines and his headaches.  Patient understands that the tremor could be slightly related to the combination of this with the divalproex.    3. Tremor  due to multiple drugs  We talked about strengthening stretching and using propranolol to help his tremor.  Patient has weighted silverware.    4. Obstructive sleep apnea  Patient has had benefit with sleeping out of bed sitting upright.    5. Dementia associated with other underlying disease without behavioral disturbance (HCC)  At this point I do not want to add any other medications to his regime we talked about the importance of diet exercise decreasing weight and he is taking monitor noted to help with his weight loss issues.        FOLLOW-UP:   6 months      My total time spent caring for the patient on the day of the encounter was 41 minutes.   This does not include time spent on separately billable procedures/tests.     EDUCATION AND COUNSELING:  -Discussed regular exercise program and prevention of cardiovascular disease, including stroke.   -Discussed healthy lifestyle, including: healthy diet (rich in fruits, vegetables, nuts and healthy oils); proper hydration, and adequate sleep hygiene (allowing 7-8 hrs of overnight sleep).        Melissa Bloch, MD  Clinical  of Neurology [facility].   Diplomate in Neurology.   Office: [phone]  Fax: [phone]

## 2024-11-18 NOTE — ASSESSMENT & PLAN NOTE
Pt states he has been doing well except when the weather comes through. Pt states that the Ubrelvy helps but it is not as good as nurtec but it still works. It does make him tired.

## 2024-11-18 NOTE — ASSESSMENT & PLAN NOTE
Pt states he could not tolerate CPAP and if he sits up with an adjustable bed that works well. Pt states that it would always get ripped off his face.Pt states that he does not use it anymore.

## 2024-11-18 NOTE — ASSESSMENT & PLAN NOTE
Pt states that he had family history in his grandma and his father also had some issues. Pt states that he has a big family of 6 kids. Pt states that he does not have much family history there.

## 2024-11-18 NOTE — ASSESSMENT & PLAN NOTE
Pt states his tremor is a little worse. It is not bothering his eating. Pt states that he takes the propranolol at night. Pt states that he thinks it helps. His grandma had tremor.

## 2024-11-25 PROCEDURE — RXMED WILLOW AMBULATORY MEDICATION CHARGE: Performed by: PSYCHIATRY & NEUROLOGY

## 2024-11-26 ENCOUNTER — PHARMACY VISIT (OUTPATIENT)
Dept: PHARMACY | Facility: MEDICAL CENTER | Age: 69
End: 2024-11-26
Payer: COMMERCIAL

## 2024-11-27 ENCOUNTER — HOSPITAL ENCOUNTER (OUTPATIENT)
Dept: RADIOLOGY | Facility: MEDICAL CENTER | Age: 69
End: 2024-11-27
Attending: NURSE PRACTITIONER
Payer: COMMERCIAL

## 2024-11-27 DIAGNOSIS — R05.3 CHRONIC COUGH: ICD-10-CM

## 2024-11-27 PROCEDURE — 71046 X-RAY EXAM CHEST 2 VIEWS: CPT

## 2024-12-16 NOTE — ED NOTES
Pt rolled off backboard. C-spine precautions in place. No abnormalities noted on posterior   Satisfactory

## 2025-01-27 PROCEDURE — RXMED WILLOW AMBULATORY MEDICATION CHARGE: Performed by: PSYCHIATRY & NEUROLOGY

## 2025-01-28 ENCOUNTER — PHARMACY VISIT (OUTPATIENT)
Dept: PHARMACY | Facility: MEDICAL CENTER | Age: 70
End: 2025-01-28
Payer: COMMERCIAL

## 2025-03-03 DIAGNOSIS — G40.509 NONINTRACTABLE EPILEPSY DUE TO EXTERNAL CAUSES, WITHOUT STATUS EPILEPTICUS (HCC): ICD-10-CM

## 2025-03-03 RX ORDER — DIVALPROEX SODIUM 500 MG/1
500 TABLET, FILM COATED, EXTENDED RELEASE ORAL DAILY
Qty: 90 TABLET | Refills: 3 | Status: SHIPPED | OUTPATIENT
Start: 2025-03-03 | End: 2026-02-26

## 2025-03-05 PROCEDURE — RXMED WILLOW AMBULATORY MEDICATION CHARGE: Performed by: PSYCHIATRY & NEUROLOGY

## 2025-03-06 ENCOUNTER — PHARMACY VISIT (OUTPATIENT)
Dept: PHARMACY | Facility: MEDICAL CENTER | Age: 70
End: 2025-03-06
Payer: COMMERCIAL

## 2025-03-10 ENCOUNTER — TELEPHONE (OUTPATIENT)
Dept: NEUROLOGY | Facility: MEDICAL CENTER | Age: 70
End: 2025-03-10
Payer: COMMERCIAL

## 2025-03-10 NOTE — TELEPHONE ENCOUNTER
Spoke to pt. Spouse on the phone regarding her voicemail. I let her know that I sent a message to our prior authorization team about the Nurtec and I would update her once I got more information.     Rin Loja, Med Ass't

## 2025-03-10 NOTE — TELEPHONE ENCOUNTER
VOICEMAIL  1. Caller Name: Kallie                      Call Back Number:     2. Message: Patient used to be on Nurtec but medication wasn't approved by insurance. They would like to send another PA. Patient's spouse states that it works better then the Ubrelvy.    3. Patient approves office to leave a detailed voicemail/MyChart message: N\A

## 2025-03-25 DIAGNOSIS — F02.80 DEMENTIA ASSOCIATED WITH OTHER UNDERLYING DISEASE WITHOUT BEHAVIORAL DISTURBANCE (HCC): ICD-10-CM

## 2025-03-26 NOTE — TELEPHONE ENCOUNTER
Received request via: Pharmacy    Medication Name/Dosage Memantine 28mg    When was medication last prescribed 5/13/24    How many refills were previously provided 3    How many Refills does he patient have left from last prescription 0    Was the patient seen in the last year in this department? Yes   Date of last office visit 11/18/24     Per last Neurology Office Visit, when was the date of next follow up visit set for?                            Date of office visit follow up request 6 mo     Does the patient have an upcoming appointment? Yes   If yes, when 5/19/25             If no, schedule appointment -    Does the patient have residential Plus and need 100 day supply (blood pressure, diabetes and cholesterol meds only)? Patient does not have SCP

## 2025-03-27 RX ORDER — MEMANTINE HYDROCHLORIDE 28 MG/1
28 CAPSULE, EXTENDED RELEASE ORAL
Qty: 30 CAPSULE | Refills: 0 | Status: SHIPPED | OUTPATIENT
Start: 2025-03-27 | End: 2026-03-22

## 2025-04-14 PROCEDURE — RXMED WILLOW AMBULATORY MEDICATION CHARGE: Performed by: PSYCHIATRY & NEUROLOGY

## 2025-04-15 ENCOUNTER — PHARMACY VISIT (OUTPATIENT)
Dept: PHARMACY | Facility: MEDICAL CENTER | Age: 70
End: 2025-04-15
Payer: COMMERCIAL

## 2025-05-19 ENCOUNTER — OFFICE VISIT (OUTPATIENT)
Dept: NEUROLOGY | Facility: MEDICAL CENTER | Age: 70
End: 2025-05-19
Attending: PSYCHIATRY & NEUROLOGY
Payer: COMMERCIAL

## 2025-05-19 VITALS
BODY MASS INDEX: 29.37 KG/M2 | WEIGHT: 182.76 LBS | TEMPERATURE: 97 F | SYSTOLIC BLOOD PRESSURE: 100 MMHG | OXYGEN SATURATION: 98 % | DIASTOLIC BLOOD PRESSURE: 62 MMHG | HEART RATE: 68 BPM | HEIGHT: 66 IN

## 2025-05-19 DIAGNOSIS — G43.019 INTRACTABLE MIGRAINE WITHOUT AURA AND WITHOUT STATUS MIGRAINOSUS: ICD-10-CM

## 2025-05-19 DIAGNOSIS — F32.1 MODERATE MAJOR DEPRESSION, SINGLE EPISODE (HCC): Primary | ICD-10-CM

## 2025-05-19 DIAGNOSIS — G40.219 COMPLEX PARTIAL EPILEPSY, INTRACTABLE (HCC): ICD-10-CM

## 2025-05-19 DIAGNOSIS — F33.41 RECURRENT MAJOR DEPRESSIVE DISORDER, IN PARTIAL REMISSION (HCC): ICD-10-CM

## 2025-05-19 PROCEDURE — 3074F SYST BP LT 130 MM HG: CPT | Performed by: PSYCHIATRY & NEUROLOGY

## 2025-05-19 PROCEDURE — 3078F DIAST BP <80 MM HG: CPT | Performed by: PSYCHIATRY & NEUROLOGY

## 2025-05-19 PROCEDURE — 99213 OFFICE O/P EST LOW 20 MIN: CPT | Performed by: PSYCHIATRY & NEUROLOGY

## 2025-05-19 PROCEDURE — 99213 OFFICE O/P EST LOW 20 MIN: CPT

## 2025-05-19 RX ORDER — RIMEGEPANT SULFATE 75 MG/75MG
75 TABLET, ORALLY DISINTEGRATING ORAL
Qty: 4 TABLET | Refills: 0 | Status: SHIPPED | OUTPATIENT
Start: 2025-05-19 | End: 2025-05-27

## 2025-05-19 ASSESSMENT — PATIENT HEALTH QUESTIONNAIRE - PHQ9
5. POOR APPETITE OR OVEREATING: 0 - NOT AT ALL
CLINICAL INTERPRETATION OF PHQ2 SCORE: 4
SUM OF ALL RESPONSES TO PHQ QUESTIONS 1-9: 8

## 2025-05-20 NOTE — ASSESSMENT & PLAN NOTE
Patient has been having some issues with depression but feels better about upcoming trip to [place].

## 2025-05-20 NOTE — PROGRESS NOTES
Chief Complaint   Patient presents with    Follow-Up       Problem List Items Addressed This Visit       Complex partial epilepsy, intractable (HCC)    No breakthrough seizures. Pt is still on the depakote in the am.         Relevant Medications    Rimegepant Sulfate (NURTEC) 75 MG TABLET DISPERSIBLE    Major depressive disorder    Patient has been having some issues with depression but feels better about upcoming trip to [place].         Moderate major depression, single episode (HCC) - Primary    Pt states that he is excited about the upcoming trip to [place] in September of this year.           Other Visit Diagnoses         Intractable migraine without aura and without status migrainosus        Relevant Medications    Rimegepant Sulfate (NURTEC) 75 MG TABLET DISPERSIBLE            History of present illness:  Eriberto Young 69 y.o. male presents today for treatment of epilepsy from a previous head trauma and we also have worked to help with the depression from his condition.    Past medical history:   Past Medical History[1]    Past surgical history:   Past Surgical History[2]    Family history:   Family History   Problem Relation Age of Onset    Alcohol abuse Father     ADD / ADHD Maternal Grandfather     ADD / ADHD Maternal Grandmother     Dementia Maternal Grandmother        Social history:   Social History     Socioeconomic History    Marital status:      Spouse name: Not on file    Number of children: Not on file    Years of education: Not on file    Highest education level: Not on file   Occupational History    Not on file   Tobacco Use    Smoking status: Never    Smokeless tobacco: Never   Vaping Use    Vaping status: Never Used   Substance and Sexual Activity    Alcohol use: Not Currently     Alcohol/week: 1.2 oz     Types: 2 Glasses of wine per week     Comment: rarely    Drug use: No    Sexual activity: Not on file   Other Topics Concern    Not on file   Social History Narrative    Not on file      Social Drivers of Health     Financial Resource Strain: Not on file   Food Insecurity: Not on file   Transportation Needs: Not on file   Physical Activity: Not on file   Stress: Not on file   Social Connections: Not on file   Intimate Partner Violence: Not on file   Housing Stability: Not on file       Current medications:   Current Outpatient Medications   Medication    Rimegepant Sulfate (NURTEC) 75 MG TABLET DISPERSIBLE    Memantine HCl ER (NAMENDA) 28 MG CAPSULE SR 24 HR    divalproex ER (DEPAKOTE ER) 500 MG TABLET SR 24 HR    ketorolac (TORADOL) 10 MG Tab    mirabegron ER (MYRBETRIQ) 25 MG TABLET SR 24 HR    propranolol LA (INDERAL LA) 60 MG CAPSULE SR 24 HR    glipiZIDE ER (GLUCOTROL XL) 5 MG TABLET SR 24 HR    famotidine (PEPCID) 20 MG Tab    zolpidem (AMBIEN) 5 MG Tab    ziprasidone (GEODON) 20 MG Cap    MOUNJARO 2.5 MG/0.5ML Solution Pen-injector    GEMTESA 75 MG Tab    atorvastatin (LIPITOR) 10 MG Tab    metformin (GLUCOPHAGE) 1000 MG tablet    Zoster Vac Recomb Adjuvanted (SHINGRIX) 50 MCG/0.5ML Recon Susp    Influenza Vac Subunit Quad (FLUCELVAX QUADRIVALENT) 0.5 ML Suspension Prefilled Syringe injection    loperamide (IMODIUM) 2 MG Cap    DULoxetine (CYMBALTA) 60 MG Cap DR Particles delayed-release capsule    therapeutic multivitamin-minerals (THERAGRAN-M) Tab    omega-3 acid ethyl esters (LOVAZA) 1 GM capsule    traZODone (DESYREL) 50 MG Tab    EMGALITY 120 MG/ML Solution Auto-injector    Ibuprofen 200 MG Cap    BOTOX 200 UNITS Recon Soln    pantoprazole (PROTONIX) 40 MG Tablet Delayed Response    hydrocodone-acetaminophen (NORCO) 5-325 MG TABS per tablet    M-VIT PO     No current facility-administered medications for this visit.       Medication Allergy:  Allergies[3]    Review of systems:   Constitutional: denies fever, night sweats, weight loss.   Eyes: denies acute vision change, eye pain or secretion.   Ears, Nose, Mouth, Throat: denies nasal secretion, nasal bleeding, difficulty swallowing,  "hearing loss, tinnitus, vertigo, ear pain, acute dental problems, oral ulcers or lesions.   Endocrine: denies recent weight changes, heat or cold intolerance, polyuria, polydypsia, polyphagia,abnormal hair growth.  Cardiovascular: denies new onset of chest pain, palpitations, syncope, or dyspnea of exertion.  Pulmonary: denies shortness of breath, new onset of cough, hemoptysis, wheezing, chest pain or flu-like symptoms.   GI: denies nausea, vomiting, diarrhea, GI bleeding, change in appetite, abdominal pain, and change in bowel habits.  : denies dysuria, urinary incontinence, hematuria.  Heme/oncology: denies history of easy bruising or bleeding. No history of cancer, DVTor PE.  Allergy/immunology: denies hives/urticaria, or itching.   Dermatologic: denies new rash, or new skin lesions.  Musculoskeletal:denies joint swelling or pain, muscle pain, neck and back pain. Neurologic: denies headaches, acute visual changes, facial droopiness, muscle weakness (focal or generalized), paresthesias, anesthesia, ataxia, change in speech or language, memory loss, abnormal movements, seizures, loss of consciousness, or episodes of confusion.   Psychiatric: denies symptoms of depression, anxiety, hallucinations, mood swings or changes, suicidal or homicidal thoughts.     Physical examination:   Vitals:    05/19/25 1303   BP: 100/62   BP Location: Left arm   Patient Position: Sitting   BP Cuff Size: Adult   Pulse: 68   Temp: 36.1 °C (97 °F)   TempSrc: Temporal   SpO2: 98%   Weight: 82.9 kg (182 lb 12.2 oz)   Height: 1.676 m (5' 6\")     General: Patient in no acute distress, pleasant and cooperative.  HEENT: Normocephalic, no signs of acute trauma.   Neck: supple, no meningeal signs or carotid bruits. There is normal range of motion. No tenderness on exam.   Chest: clear to auscultation. No cough.   CV: RRR, no murmurs.   Skin: no signs of acute rashes or trauma.   Musculoskeletal: joints exhibit full range of motion, without any " pain to palpation. There are no signs of joint or muscle swelling. There is no tenderness to deep palpation of muscles.   Psychiatric: No hallucinatory behavior. Denies symptoms of depression or suicidal ideation. Mood and affect appear normal on exam.     NEUROLOGICAL EXAM:   Mental status, orientation: Awake, alert and fully oriented.   Speech and language: speech is clear and fluent. The patient is able to name, repeat and comprehend.   Memory: There is intact recollection of recent and remote events.   Cranial nerve exam: Pupils are 3-4 mm bilaterally and equally reactive to light and accommodation. Visual fields are intact by confrontation. Fundoscopic exam was unremarkable. There is no nystagmus on primary or secondary gaze. Intact full EOM in all directions of gaze. Face appears symmetric. Sensation in the face is intact to light touch. Uvula is midline. Palate elevates symmetrically. Tongue is midline and without any signs of tongue biting or fasciculations. Sternocleidomastoid muscles exhibit is normal strength bilaterally. Shoulder shrug is intact bilaterally.   Motor exam: Strength is 5/5 in all extremities. Tone is normal. Positive abnormal movements were seen on exam in his right side..   Sensory exam reveals normal sense of light touch, proprioception, vibration and pinprick in all extremities.   Deep tendon reflexes:  2+ throughout. Plantar responses are flexor. There is no clonus.   Coordination: shows a normal finger-nose-finger. Normal rapidly alternating movements.   Gait: The patient was able to get up from seated position on first attempt without requiring assistance. Found to be steady when walking. Movements were fluid with normal arm swing. The patient was able to turn without difficulties or tendency to fall. Romberg examination positive      ANCILLARY DATA REVIEWED:     Lab Data Review:  No results found for this or any previous visit (from the past 24 hours).    Records reviewed:   Labs  reviewed    Imaging:       Details    Reading Physician Reading Date Result Priority   Rye Hassan M.D.  [phone] 5/21/2019 STAT     Narrative & Impression  AMINATION:  5/20/2019 5:23 PM     HISTORY/REASON FOR EXAM:  Hydrocephalus, communicating; s/p shunt     TECHNIQUE/EXAM DESCRIPTION:  MRI of the brain without and with contrast.     T1 sagittal, T2 fast spin-echo axial, T1 coronal, FLAIR coronal, Diffusion weighted and Apparent Diffusion Coefficient (ADC map) axial images were obtained of the whole brain. T1 post-contrast axial and T1 post-contrast coronal images were obtained.     The study was performed on a Avegant 1.5 Yo[place] MRI scanner.     COMPARISON: MRI scan of the brain 10/8/2008 and head CT 5/19/2013     FINDINGS:     The calvariae are normal.  There are no extra-axial fluid collections.  There is a right frontal ventriculoperitoneal shunt in place coursing through the right frontal horn. There is T2 signal intensity surrounding the shunt tube tract     The ventricular system is moderately dilated and has a colpocephalic appearance. 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 Further there is chronic white matter loss in the parietal and occipital lobes. There is mild-to-moderate   prominence of the cortical sulci and gyri.  There are a few scattered punctate and patchy areas of increased T2 signal intensity in the periventricular white matter.  There are no mass effects or shift of midline structures.     The diffusion weighted images are unremarkable.     There are no hemorrhagic lesions.  The brainstem and posterior fossa structures are unremarkable.     Vascular flow voids in the carotid and vertebrobasilar arteries, [place], and dural venous sinuses are intact.     The paranasal sinuses and mastoid air cells are free of mucosal inflammatory change.        IMPRESSION:        1. Age-related cerebral atrophy.     2. Moderate ventriculomegaly which has a colpocephalic  appearance. The degree of ventricular dilatation is not changed since previous head CT scan of 5/19/2019.     3. Right frontal ventriculoperitoneal shunt unchanged in appearance with a small amount of edema surrounding the shunt tube tract.     4. 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         ASSESSMENT AND PLAN:    1. Moderate major depression, single episode (HCC) (Primary)  Plan to continue with exercise and therapy for treatment.    2. Complex partial epilepsy, intractable (HCC)  Plan to continue with current seizure medications including Depakote    3. Intractable migraine without aura and without status migrainosus  Patient given samples of Nurtec for his travel to [place]  - Rimegepant Sulfate (NURTEC) 75 MG TABLET DISPERSIBLE; Take 1 Tablet by mouth every 48 hours as needed (migraine) for up to 8 days.  Dispense: 4 Tablet; Refill: 0    My total time spent caring for the patient on the day of the encounter was 20 minutes.   This does not include time spent on separately billable procedures/tests.         FOLLOW-UP:   Return in about 6 months (around 11/19/2025).          EDUCATION AND COUNSELING:  -Discussed regular exercise program and prevention of cardiovascular disease, including stroke.   -Discussed healthy lifestyle, including: healthy diet (rich in fruits, vegetables, nuts and healthy oils); proper hydration, and adequate sleep hygiene (allowing 7-8 hrs of overnight sleep).        Melissa Bloch, MD  Clinical  of Neurology [facility].   Diplomate in Neurology.   Office: [phone]  Fax: [phone]             [1]   Past Medical History:  Diagnosis Date    Anxiety     Chronic pain     Complex partial epilepsy, intractable (HCC)     Depression     Diabetes (HCC)     Head injuries     Hydrocephalus (HCC)     w/ shunt    Post concussion syndrome 11/10/2014    Self-injurious  behavior     Suicide attempt (HCC)    [2]   Past Surgical History:  Procedure Laterality Date    ABDOMINAL EXPLORATION      ACHILLES TENDON REPAIR      CRANIOTOMY      HERNIA REP HIATAL      times 2    HERNIA REPAIR      LAMINOTOMY     [3]   Allergies  Allergen Reactions    Ativan Unspecified     Okay with small amount    Hydromorphone Unspecified     Okay with small amount    Ativan      High doses    Hydromorphone Hcl     Nkda [No Known Drug Allergy]

## 2025-05-27 DIAGNOSIS — G43.719 INTRACTABLE CHRONIC MIGRAINE WITHOUT AURA AND WITHOUT STATUS MIGRAINOSUS: ICD-10-CM

## 2025-05-27 RX ORDER — UBROGEPANT 100 MG/1
100 TABLET ORAL DAILY
Qty: 10 TABLET | Refills: 5 | Status: SHIPPED | OUTPATIENT
Start: 2025-05-27 | End: 2025-11-23

## 2025-05-27 NOTE — TELEPHONE ENCOUNTER
Received request via: Pharmacy    Medication Name/Dosage Ubrogepant (UBRELVY) 100 MG Tab      When was medication last prescribed 10/10/24    How many refills were previously provided 4    How many Refills does he patient have left from last prescription 0    Was the patient seen in the last year in this department? Yes   Date of last office visit 05/19/25     Per last Neurology Office Visit, when was the date of next follow up visit set for?                            Date of office visit follow up request 6 months     Does the patient have an upcoming appointment? Yes   If yes, when 11/17/25             If no, schedule appointment N/A    Does the patient have retirement Plus and need 100 day supply (blood pressure, diabetes and cholesterol meds only)? Patient does not have SCP

## 2025-05-29 ENCOUNTER — TELEPHONE (OUTPATIENT)
Dept: NEUROLOGY | Facility: MEDICAL CENTER | Age: 70
End: 2025-05-29
Payer: COMMERCIAL

## 2025-05-29 NOTE — TELEPHONE ENCOUNTER
Received Renewal PA request via [employer] via [place] [employer]  for UBRELVY . (Quantity:10, Day Supply:30)     Insurance: [employer]  Member ID:  [id number]  BIN: [id number]  PCN: [id number]  Group: [id number]      Ran Test claim via [employer] & medication Rejects stating prior authorization is required.

## 2025-07-01 DIAGNOSIS — F02.80 DEMENTIA ASSOCIATED WITH OTHER UNDERLYING DISEASE WITHOUT BEHAVIORAL DISTURBANCE (HCC): ICD-10-CM

## 2025-07-06 DIAGNOSIS — G43.719 INTRACTABLE CHRONIC MIGRAINE WITHOUT AURA AND WITHOUT STATUS MIGRAINOSUS: ICD-10-CM

## 2025-07-07 RX ORDER — TOPIRAMATE SPINKLE 25 MG/1
25 CAPSULE ORAL 2 TIMES DAILY
Qty: 180 CAPSULE | Refills: 3 | Status: SHIPPED | OUTPATIENT
Start: 2025-07-07 | End: 2026-07-02

## 2025-07-07 RX ORDER — MEMANTINE HYDROCHLORIDE 28 MG/1
28 CAPSULE, EXTENDED RELEASE ORAL
Qty: 30 CAPSULE | Refills: 11 | Status: SHIPPED | OUTPATIENT
Start: 2025-07-07

## 2025-07-29 DIAGNOSIS — G25.1 TREMOR DUE TO MULTIPLE DRUGS: ICD-10-CM

## 2025-07-29 DIAGNOSIS — G43.019 INTRACTABLE MIGRAINE WITHOUT AURA AND WITHOUT STATUS MIGRAINOSUS: ICD-10-CM

## 2025-07-29 RX ORDER — PROPRANOLOL HYDROCHLORIDE 60 MG/1
60 CAPSULE, EXTENDED RELEASE ORAL DAILY
Qty: 90 CAPSULE | Refills: 3 | Status: SHIPPED | OUTPATIENT
Start: 2025-07-29

## 2025-08-07 ENCOUNTER — SPECIALTY PHARMACY (OUTPATIENT)
Dept: NEUROLOGY | Facility: MEDICAL CENTER | Age: 70
End: 2025-08-07
Payer: COMMERCIAL

## 2025-08-19 DIAGNOSIS — G43.019 INTRACTABLE MIGRAINE WITHOUT AURA AND WITHOUT STATUS MIGRAINOSUS: Primary | ICD-10-CM

## 2025-08-19 RX ORDER — GALCANEZUMAB 120 MG/ML
INJECTION, SOLUTION SUBCUTANEOUS
Qty: 1 EACH | Refills: 3 | Status: SHIPPED | OUTPATIENT
Start: 2025-08-19

## 2025-08-21 ENCOUNTER — HOSPITAL ENCOUNTER (OUTPATIENT)
Dept: RADIOLOGY | Facility: MEDICAL CENTER | Age: 70
End: 2025-08-21
Attending: ORTHOPAEDIC SURGERY
Payer: COMMERCIAL

## 2025-08-21 DIAGNOSIS — S46.012A STRAIN OF TENDON OF LEFT ROTATOR CUFF, INITIAL ENCOUNTER: ICD-10-CM

## 2025-08-21 PROCEDURE — 73221 MRI JOINT UPR EXTREM W/O DYE: CPT | Mod: LT
